# Patient Record
Sex: MALE | Race: BLACK OR AFRICAN AMERICAN
[De-identification: names, ages, dates, MRNs, and addresses within clinical notes are randomized per-mention and may not be internally consistent; named-entity substitution may affect disease eponyms.]

---

## 2019-09-18 ENCOUNTER — HOSPITAL ENCOUNTER (INPATIENT)
Dept: HOSPITAL 74 - YASAS | Age: 64
LOS: 13 days | Discharge: HOME | DRG: 772 | End: 2019-10-01
Attending: NEUROMUSCULOSKELETAL MEDICINE & OMM | Admitting: NEUROMUSCULOSKELETAL MEDICINE & OMM
Payer: COMMERCIAL

## 2019-09-18 VITALS — BODY MASS INDEX: 19.6 KG/M2

## 2019-09-18 DIAGNOSIS — F14.20: ICD-10-CM

## 2019-09-18 DIAGNOSIS — F10.20: Primary | ICD-10-CM

## 2019-09-18 DIAGNOSIS — I10: ICD-10-CM

## 2019-09-18 DIAGNOSIS — H10.89: ICD-10-CM

## 2019-09-18 DIAGNOSIS — F17.210: ICD-10-CM

## 2019-09-18 DIAGNOSIS — K21.9: ICD-10-CM

## 2019-09-18 DIAGNOSIS — I69.351: ICD-10-CM

## 2019-09-18 DIAGNOSIS — G40.909: ICD-10-CM

## 2019-09-18 DIAGNOSIS — F19.282: ICD-10-CM

## 2019-09-18 DIAGNOSIS — Z21: ICD-10-CM

## 2019-09-18 DIAGNOSIS — F12.20: ICD-10-CM

## 2019-09-18 PROCEDURE — HZ42ZZZ GROUP COUNSELING FOR SUBSTANCE ABUSE TREATMENT, COGNITIVE-BEHAVIORAL: ICD-10-PCS | Performed by: ALLERGY & IMMUNOLOGY

## 2019-09-18 RX ADMIN — Medication SCH: at 22:55

## 2019-09-18 RX ADMIN — ATOVAQUONE SCH: 750 SUSPENSION ORAL at 22:54

## 2019-09-18 RX ADMIN — PANTOPRAZOLE SODIUM SCH: 20 TABLET, DELAYED RELEASE ORAL at 22:54

## 2019-09-18 RX ADMIN — LEVETIRACETAM SCH: 500 TABLET, FILM COATED ORAL at 22:54

## 2019-09-18 RX ADMIN — ATORVASTATIN CALCIUM SCH: 20 TABLET, FILM COATED ORAL at 22:54
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
## 2019-09-18 NOTE — PN
Teaching Attending Note


Name of Resident: Kervin Cherry





ATTENDING PHYSICIAN STATEMENT





I saw and evaluated the patient.


I reviewed the resident's note and discussed the case with the resident.


I agree with the resident's findings and plan as documented.








SUBJECTIVE: 65yo with h/o HIV, HTN, seizures and PUD, recently discharged from 

[facility] after a 2 day stay for seizures- was not taking his meds. 


Pt here for rehab- uses alcohol and marijuana.





There are no beds available- pt seen by counselor and will go home, as there 

are no other beds available, pt will return tomorrow
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
## 2019-09-19 LAB
ALBUMIN SERPL-MCNC: 2.7 G/DL (ref 3.4–5)
ALP SERPL-CCNC: 45 U/L (ref 45–117)
ALT SERPL-CCNC: 19 U/L (ref 13–61)
ANION GAP SERPL CALC-SCNC: 7 MMOL/L (ref 8–16)
AST SERPL-CCNC: 21 U/L (ref 15–37)
BILIRUB SERPL-MCNC: 0.3 MG/DL (ref 0.2–1)
BUN SERPL-MCNC: 15.1 MG/DL (ref 7–18)
CALCIUM SERPL-MCNC: 8.4 MG/DL (ref 8.5–10.1)
CHLORIDE SERPL-SCNC: 109 MMOL/L (ref 98–107)
CO2 SERPL-SCNC: 22 MMOL/L (ref 21–32)
CREAT SERPL-MCNC: 1 MG/DL (ref 0.55–1.3)
DEPRECATED RDW RBC AUTO: 19.3 % (ref 11.9–15.9)
GLUCOSE SERPL-MCNC: 98 MG/DL (ref 74–106)
HCT VFR BLD CALC: 27.2 % (ref 35.4–49)
HGB BLD-MCNC: 9.2 GM/DL (ref 11.7–16.9)
MCH RBC QN AUTO: 29.5 PG (ref 25.7–33.7)
MCHC RBC AUTO-ENTMCNC: 33.7 G/DL (ref 32–35.9)
MCV RBC: 87.8 FL (ref 80–96)
PLATELET # BLD AUTO: 157 K/MM3 (ref 134–434)
PMV BLD: 9.2 FL (ref 7.5–11.1)
POTASSIUM SERPLBLD-SCNC: 3.9 MMOL/L (ref 3.5–5.1)
PROT SERPL-MCNC: 7.3 G/DL (ref 6.4–8.2)
RBC # BLD AUTO: 3.1 M/MM3 (ref 4–5.6)
SODIUM SERPL-SCNC: 138 MMOL/L (ref 136–145)
WBC # BLD AUTO: 1.7 K/MM3 (ref 4–10)

## 2019-09-19 RX ADMIN — MAGNESIUM HYDROXIDE PRN ML: 400 SUSPENSION ORAL at 19:12

## 2019-09-19 RX ADMIN — Medication SCH TAB: at 10:08

## 2019-09-19 RX ADMIN — PANTOPRAZOLE SODIUM SCH MG: 20 TABLET, DELAYED RELEASE ORAL at 21:30

## 2019-09-19 RX ADMIN — LEVETIRACETAM SCH MG: 500 TABLET, FILM COATED ORAL at 10:08

## 2019-09-19 RX ADMIN — NICOTINE SCH: 7 PATCH TRANSDERMAL at 10:09

## 2019-09-19 RX ADMIN — QUETIAPINE FUMARATE SCH MG: 100 TABLET ORAL at 21:30

## 2019-09-19 RX ADMIN — ATOVAQUONE SCH MG: 750 SUSPENSION ORAL at 10:08

## 2019-09-19 RX ADMIN — ALUMINUM HYDROXIDE, MAGNESIUM HYDROXIDE, AND SIMETHICONE PRN ML: 200; 200; 20 SUSPENSION ORAL at 14:46

## 2019-09-19 RX ADMIN — PANTOPRAZOLE SODIUM SCH MG: 20 TABLET, DELAYED RELEASE ORAL at 10:08

## 2019-09-19 RX ADMIN — Medication SCH MG: at 21:30

## 2019-09-19 RX ADMIN — ATOVAQUONE SCH: 750 SUSPENSION ORAL at 21:30

## 2019-09-19 RX ADMIN — ATORVASTATIN CALCIUM SCH MG: 20 TABLET, FILM COATED ORAL at 21:30

## 2019-09-19 RX ADMIN — LEVETIRACETAM SCH MG: 500 TABLET, FILM COATED ORAL at 21:30

## 2019-09-19 NOTE — CONSULT
BHS Psychiatric Consult





- Data


Date of interview: 09/19/19


Admission source: [facility]


Identifying data: Patient is a 64 year old single  male, father 

of seven, unemployed, domiciled, and is supported by [facility]. This is patient's 

first admission to rehab at [facility]. Patient admitted to 

 for alcohol, marijuana, and cocaine dependence.


Substance Abuse History: Smoking Cessation.  Smoking history: Current every day 

smoker.  Aproximately how many cigarettes per day: 2.  - Substances abused.  ** 

Alcohol.  Substance route: Oral.  Frequency: 1-2 times per week.  Amount used: 1

/2 pint of vodka.  Age of first use: 14.  Date of last use: 09/11/19.  ** 

Marijuana/Hashish.  Substance route: Smoking.  Frequency: Daily.  Amount used: 

2 blunts.  Age of first use: 11.  Date of last use: 09/09/19


Medical History: hypertension, seizures, CVA


Psychiatric History: Patient denies h/o psychiatric hospitalization, outpatient 

care, and suicide attempt. Patient is currently prescribe zoloft 100mg daily + 

seroquel 100mg (30 day script on 9/9/19) which he claims he received from his 

PCP at the [facility] in [place], [place]. Reports never taking zoloft but has 

taken seroquel for insomnia. At present, patient reports stable mood but is 

experiencing difficulty sleeping.


Physical/Sexual Abuse/Trauma History: denies.





Mental Status Exam





- Mental Status Exam


Alert and Oriented to: Time, Place, Person


Cognitive Function: Good


Patient Appearance: Well Groomed


Mood: Euthymic


Affect: Mood Congruent


Patient Behavior: Cooperative


Speech Pattern: Appropriate


Voice Loudness: Moderately Soft/Quiet


Thought Process: Goal Oriented


Thought Disorder: Not Present


Hallucinations: Denies


Suicidal Ideation: Denies


Homicidal Ideation: Denies


Insight/Judgement: Poor


Sleep: Poorly


Appetite: Fair


Muscle strength/Tone: Normal


Gait/Station: Other (Patient ambulates with a cane.)





Psychiatric Findings





- Problem List (Axis 1, 2,3)


(1) Alcohol dependence


Current Visit: Yes   Status: Acute   





(2) Cocaine dependence


Current Visit: Yes   Status: Acute   





(3) Marijuana dependence


Current Visit: Yes   Status: Acute   





(4) Substance-induced sleep disorder


Current Visit: Yes   Status: Acute   





- Initial Treatment Plan


Initial Treatment Plan: Psychoeducation provided. Rehab in progress. Will order 

Seroquel 50mg HS (reduced dose due to noncompliance and unsteady gait). 

Benefits and side effects discussed. Verbal consent given.

## 2019-09-19 NOTE — EKG
Test Reason : 

Blood Pressure : ***/*** mmHG

Vent. Rate : 069 BPM     Atrial Rate : 069 BPM

   P-R Int : 156 ms          QRS Dur : 072 ms

    QT Int : 416 ms       P-R-T Axes : 076 059 056 degrees

   QTc Int : 445 ms

 

NORMAL SINUS RHYTHM

NORMAL ECG

NO PREVIOUS ECGS AVAILABLE

Confirmed by LACI ARAMBULA, MICHOACANO ([id number]) on 9/19/2019 2:09:49 PM

 

Referred By: CHICO MONTE           Confirmed By:MICHOACANO AGUERO MD

## 2019-09-19 NOTE — PN
BHS Progress Note (SOAP)


Subjective: 


Patient experienced a witnessed seizure. On Keppra, level pending. PMHx of 

seizure disorder from childhood. 





Objective: 


BP: 143/76, HR-93, afebrile, O2 sats98%. during seizure. 


General: calm and fatigued after seizure


HEENT: normocephalic, PERRLA


Lungs: clear


Heart: s1 s2 audible


Neuro: CN 2-12 intact, 


09/19/19 15:33





Assessment: 


seizure disorder


09/19/19 15:35





Plan: 


Keppra level pending. Consulted with chief resident. If keppra level is within 

normal limits, increase Keppra dose from 500mg to 1,000 BID. Continue to monitor

; transfer to ER is not needed at this time. Nurses aware of plan.

## 2019-09-19 NOTE — PN
BHS Progress Note


Note: 


patient is adherent to his HIV medications. Also required prophylaxis for MAC. 

Bictarvy and Azithromycin ordered for the patient. Discussed with patient.

## 2019-09-20 RX ADMIN — LEVETIRACETAM SCH MG: 500 TABLET, FILM COATED ORAL at 21:20

## 2019-09-20 RX ADMIN — BICTEGRAVIR SODIUM, EMTRICITABINE, AND TENOFOVIR ALAFENAMIDE FUMARATE SCH EACH: 50; 200; 25 TABLET ORAL at 07:46

## 2019-09-20 RX ADMIN — Medication SCH TAB: at 10:16

## 2019-09-20 RX ADMIN — LEVETIRACETAM SCH MG: 500 TABLET, FILM COATED ORAL at 10:16

## 2019-09-20 RX ADMIN — PANTOPRAZOLE SODIUM SCH MG: 20 TABLET, DELAYED RELEASE ORAL at 10:16

## 2019-09-20 RX ADMIN — Medication SCH MG: at 21:24

## 2019-09-20 RX ADMIN — MAGNESIUM HYDROXIDE PRN ML: 400 SUSPENSION ORAL at 21:28

## 2019-09-20 RX ADMIN — ATORVASTATIN CALCIUM SCH MG: 20 TABLET, FILM COATED ORAL at 21:21

## 2019-09-20 RX ADMIN — ATOVAQUONE SCH: 750 SUSPENSION ORAL at 21:23

## 2019-09-20 RX ADMIN — QUETIAPINE FUMARATE SCH MG: 100 TABLET ORAL at 21:21

## 2019-09-20 RX ADMIN — ALUMINUM HYDROXIDE, MAGNESIUM HYDROXIDE, AND SIMETHICONE PRN ML: 200; 200; 20 SUSPENSION ORAL at 14:33

## 2019-09-20 RX ADMIN — NICOTINE SCH: 7 PATCH TRANSDERMAL at 10:16

## 2019-09-20 RX ADMIN — ATOVAQUONE SCH MG: 750 SUSPENSION ORAL at 10:15

## 2019-09-20 RX ADMIN — PANTOPRAZOLE SODIUM SCH MG: 20 TABLET, DELAYED RELEASE ORAL at 21:21

## 2019-09-21 RX ADMIN — ATORVASTATIN CALCIUM SCH: 20 TABLET, FILM COATED ORAL at 21:19

## 2019-09-21 RX ADMIN — LEVETIRACETAM SCH: 500 TABLET, FILM COATED ORAL at 10:53

## 2019-09-21 RX ADMIN — Medication SCH MG: at 21:19

## 2019-09-21 RX ADMIN — PANTOPRAZOLE SODIUM SCH: 20 TABLET, DELAYED RELEASE ORAL at 10:53

## 2019-09-21 RX ADMIN — ATOVAQUONE SCH: 750 SUSPENSION ORAL at 21:20

## 2019-09-21 RX ADMIN — ALUMINUM HYDROXIDE, MAGNESIUM HYDROXIDE, AND SIMETHICONE PRN ML: 200; 200; 20 SUSPENSION ORAL at 16:44

## 2019-09-21 RX ADMIN — QUETIAPINE FUMARATE SCH: 100 TABLET ORAL at 21:20

## 2019-09-21 RX ADMIN — ATOVAQUONE SCH: 750 SUSPENSION ORAL at 10:53

## 2019-09-21 RX ADMIN — Medication SCH: at 10:53

## 2019-09-21 RX ADMIN — PANTOPRAZOLE SODIUM SCH: 20 TABLET, DELAYED RELEASE ORAL at 21:20

## 2019-09-21 RX ADMIN — BICTEGRAVIR SODIUM, EMTRICITABINE, AND TENOFOVIR ALAFENAMIDE FUMARATE SCH: 50; 200; 25 TABLET ORAL at 08:50

## 2019-09-21 RX ADMIN — NICOTINE SCH: 7 PATCH TRANSDERMAL at 10:53

## 2019-09-21 RX ADMIN — LEVETIRACETAM SCH: 500 TABLET, FILM COATED ORAL at 21:19

## 2019-09-22 RX ADMIN — ATORVASTATIN CALCIUM SCH: 20 TABLET, FILM COATED ORAL at 22:54

## 2019-09-22 RX ADMIN — ATOVAQUONE SCH: 750 SUSPENSION ORAL at 09:59

## 2019-09-22 RX ADMIN — QUETIAPINE FUMARATE SCH: 100 TABLET ORAL at 22:55

## 2019-09-22 RX ADMIN — BICTEGRAVIR SODIUM, EMTRICITABINE, AND TENOFOVIR ALAFENAMIDE FUMARATE SCH: 50; 200; 25 TABLET ORAL at 09:00

## 2019-09-22 RX ADMIN — ATOVAQUONE SCH: 750 SUSPENSION ORAL at 22:54

## 2019-09-22 RX ADMIN — NICOTINE SCH: 7 PATCH TRANSDERMAL at 10:00

## 2019-09-22 RX ADMIN — LEVETIRACETAM SCH: 500 TABLET, FILM COATED ORAL at 22:54

## 2019-09-22 RX ADMIN — PANTOPRAZOLE SODIUM SCH: 20 TABLET, DELAYED RELEASE ORAL at 10:00

## 2019-09-22 RX ADMIN — Medication SCH: at 10:00

## 2019-09-22 RX ADMIN — PANTOPRAZOLE SODIUM SCH: 20 TABLET, DELAYED RELEASE ORAL at 22:54

## 2019-09-22 RX ADMIN — Medication SCH: at 22:55

## 2019-09-22 RX ADMIN — LEVETIRACETAM SCH MG: 500 TABLET, FILM COATED ORAL at 09:58

## 2019-09-23 RX ADMIN — ATOVAQUONE SCH: 750 SUSPENSION ORAL at 22:18

## 2019-09-23 RX ADMIN — ATORVASTATIN CALCIUM SCH: 20 TABLET, FILM COATED ORAL at 22:18

## 2019-09-23 RX ADMIN — LEVETIRACETAM SCH: 500 TABLET, FILM COATED ORAL at 22:17

## 2019-09-23 RX ADMIN — BICTEGRAVIR SODIUM, EMTRICITABINE, AND TENOFOVIR ALAFENAMIDE FUMARATE SCH EACH: 50; 200; 25 TABLET ORAL at 08:03

## 2019-09-23 RX ADMIN — Medication SCH: at 22:18

## 2019-09-23 RX ADMIN — LEVETIRACETAM SCH MG: 500 TABLET, FILM COATED ORAL at 10:05

## 2019-09-23 RX ADMIN — PANTOPRAZOLE SODIUM SCH MG: 20 TABLET, DELAYED RELEASE ORAL at 09:04

## 2019-09-23 RX ADMIN — NICOTINE SCH: 7 PATCH TRANSDERMAL at 10:05

## 2019-09-23 RX ADMIN — Medication SCH TAB: at 10:06

## 2019-09-23 RX ADMIN — ATOVAQUONE SCH: 750 SUSPENSION ORAL at 10:05

## 2019-09-23 RX ADMIN — QUETIAPINE FUMARATE SCH: 100 TABLET ORAL at 22:19

## 2019-09-23 RX ADMIN — PANTOPRAZOLE SODIUM SCH: 20 TABLET, DELAYED RELEASE ORAL at 22:18

## 2019-09-24 LAB
APPEARANCE UR: CLEAR
BILIRUB UR STRIP.AUTO-MCNC: NEGATIVE MG/DL
COLOR UR: YELLOW
KETONES UR QL STRIP: NEGATIVE
LEUKOCYTE ESTERASE UR QL STRIP.AUTO: NEGATIVE
NITRITE UR QL STRIP: NEGATIVE
PH UR: 5.5 [PH] (ref 5–8)
PROT UR QL STRIP: NEGATIVE
PROT UR QL STRIP: NEGATIVE
SP GR UR: 1.02 (ref 1.01–1.03)
UROBILINOGEN UR STRIP-MCNC: 0.2 MG/DL (ref 0.2–1)

## 2019-09-24 RX ADMIN — ALUMINUM HYDROXIDE, MAGNESIUM HYDROXIDE, AND SIMETHICONE PRN ML: 200; 200; 20 SUSPENSION ORAL at 10:02

## 2019-09-24 RX ADMIN — BICTEGRAVIR SODIUM, EMTRICITABINE, AND TENOFOVIR ALAFENAMIDE FUMARATE SCH EACH: 50; 200; 25 TABLET ORAL at 08:01

## 2019-09-24 RX ADMIN — LEVETIRACETAM SCH MG: 500 TABLET, FILM COATED ORAL at 21:28

## 2019-09-24 RX ADMIN — ALUMINUM HYDROXIDE, MAGNESIUM HYDROXIDE, AND SIMETHICONE PRN ML: 200; 200; 20 SUSPENSION ORAL at 21:29

## 2019-09-24 RX ADMIN — Medication SCH MG: at 21:28

## 2019-09-24 RX ADMIN — PANTOPRAZOLE SODIUM SCH MG: 20 TABLET, DELAYED RELEASE ORAL at 10:00

## 2019-09-24 RX ADMIN — NICOTINE SCH: 7 PATCH TRANSDERMAL at 10:00

## 2019-09-24 RX ADMIN — ATOVAQUONE SCH: 750 SUSPENSION ORAL at 10:00

## 2019-09-24 RX ADMIN — LEVETIRACETAM SCH MG: 500 TABLET, FILM COATED ORAL at 10:00

## 2019-09-24 RX ADMIN — PANTOPRAZOLE SODIUM SCH MG: 20 TABLET, DELAYED RELEASE ORAL at 21:28

## 2019-09-24 RX ADMIN — ATOVAQUONE SCH: 750 SUSPENSION ORAL at 21:28

## 2019-09-24 RX ADMIN — Medication SCH TAB: at 10:00

## 2019-09-24 RX ADMIN — ATORVASTATIN CALCIUM SCH MG: 20 TABLET, FILM COATED ORAL at 21:28

## 2019-09-24 RX ADMIN — QUETIAPINE FUMARATE SCH MG: 100 TABLET ORAL at 21:27

## 2019-09-25 RX ADMIN — BICTEGRAVIR SODIUM, EMTRICITABINE, AND TENOFOVIR ALAFENAMIDE FUMARATE SCH EACH: 50; 200; 25 TABLET ORAL at 08:07

## 2019-09-25 RX ADMIN — NICOTINE SCH: 7 PATCH TRANSDERMAL at 10:08

## 2019-09-25 RX ADMIN — ATOVAQUONE SCH: 750 SUSPENSION ORAL at 21:18

## 2019-09-25 RX ADMIN — ALUMINUM HYDROXIDE, MAGNESIUM HYDROXIDE, AND SIMETHICONE PRN ML: 200; 200; 20 SUSPENSION ORAL at 21:18

## 2019-09-25 RX ADMIN — LEVETIRACETAM SCH MG: 500 TABLET, FILM COATED ORAL at 10:07

## 2019-09-25 RX ADMIN — ALUMINUM HYDROXIDE, MAGNESIUM HYDROXIDE, AND SIMETHICONE PRN ML: 200; 200; 20 SUSPENSION ORAL at 10:09

## 2019-09-25 RX ADMIN — PANTOPRAZOLE SODIUM SCH MG: 20 TABLET, DELAYED RELEASE ORAL at 21:18

## 2019-09-25 RX ADMIN — QUETIAPINE FUMARATE SCH MG: 100 TABLET ORAL at 21:18

## 2019-09-25 RX ADMIN — ATORVASTATIN CALCIUM SCH MG: 20 TABLET, FILM COATED ORAL at 21:18

## 2019-09-25 RX ADMIN — Medication SCH TAB: at 10:06

## 2019-09-25 RX ADMIN — PANTOPRAZOLE SODIUM SCH MG: 20 TABLET, DELAYED RELEASE ORAL at 10:06

## 2019-09-25 RX ADMIN — Medication SCH MG: at 21:18

## 2019-09-25 RX ADMIN — ATOVAQUONE SCH: 750 SUSPENSION ORAL at 10:07

## 2019-09-25 RX ADMIN — LEVETIRACETAM SCH MG: 500 TABLET, FILM COATED ORAL at 21:18

## 2019-09-26 RX ADMIN — TETRAHYDROZOLINE HCL SCH DROP: 0.05 SOLUTION/ DROPS OPHTHALMIC at 21:40

## 2019-09-26 RX ADMIN — Medication SCH MG: at 21:39

## 2019-09-26 RX ADMIN — PANTOPRAZOLE SODIUM SCH MG: 20 TABLET, DELAYED RELEASE ORAL at 10:01

## 2019-09-26 RX ADMIN — LEVETIRACETAM SCH MG: 500 TABLET, FILM COATED ORAL at 21:39

## 2019-09-26 RX ADMIN — LEVETIRACETAM SCH MG: 500 TABLET, FILM COATED ORAL at 10:00

## 2019-09-26 RX ADMIN — ATORVASTATIN CALCIUM SCH MG: 20 TABLET, FILM COATED ORAL at 21:39

## 2019-09-26 RX ADMIN — ALUMINUM HYDROXIDE, MAGNESIUM HYDROXIDE, AND SIMETHICONE PRN ML: 200; 200; 20 SUSPENSION ORAL at 10:04

## 2019-09-26 RX ADMIN — NICOTINE SCH: 7 PATCH TRANSDERMAL at 10:01

## 2019-09-26 RX ADMIN — TETRAHYDROZOLINE HCL SCH DROP: 0.05 SOLUTION/ DROPS OPHTHALMIC at 18:15

## 2019-09-26 RX ADMIN — PANTOPRAZOLE SODIUM SCH MG: 20 TABLET, DELAYED RELEASE ORAL at 21:39

## 2019-09-26 RX ADMIN — BICTEGRAVIR SODIUM, EMTRICITABINE, AND TENOFOVIR ALAFENAMIDE FUMARATE SCH EACH: 50; 200; 25 TABLET ORAL at 09:00

## 2019-09-26 RX ADMIN — Medication SCH TAB: at 10:01

## 2019-09-26 RX ADMIN — QUETIAPINE FUMARATE SCH MG: 100 TABLET ORAL at 21:39

## 2019-09-26 RX ADMIN — ATOVAQUONE SCH: 750 SUSPENSION ORAL at 10:01

## 2019-09-26 RX ADMIN — ATOVAQUONE SCH: 750 SUSPENSION ORAL at 21:39

## 2019-09-26 NOTE — PN
BHS Progress Note (SOAP)


Subjective: 


Patient c/o redness and itching in right eye. Denies injury, purulent discharge

, stuck together eyelids upon awakening. 





Objective: 


P/E


General: no apparent distress, resting comfortably in bed


HEENTM: PERRLA, Right eye injected, tearing. 


09/26/19 14:03





Assessment: 


allergic conjunctivitis vs viral conjunctivitis


09/26/19 14:04





Plan: 


Ordered Naphcon-A, will continue to monitor; any changes will re-assess.

## 2019-09-27 ENCOUNTER — HOSPITAL ENCOUNTER (EMERGENCY)
Dept: HOSPITAL 74 - JER | Age: 64
LOS: 1 days | Discharge: ANOTHER HOSPITAL | End: 2019-09-28
Payer: COMMERCIAL

## 2019-09-27 VITALS — BODY MASS INDEX: 19.6 KG/M2

## 2019-09-27 VITALS — SYSTOLIC BLOOD PRESSURE: 140 MMHG | DIASTOLIC BLOOD PRESSURE: 77 MMHG | TEMPERATURE: 98.2 F | HEART RATE: 95 BPM

## 2019-09-27 DIAGNOSIS — F14.10: ICD-10-CM

## 2019-09-27 DIAGNOSIS — F17.210: ICD-10-CM

## 2019-09-27 DIAGNOSIS — I10: ICD-10-CM

## 2019-09-27 DIAGNOSIS — F10.10: ICD-10-CM

## 2019-09-27 DIAGNOSIS — G40.909: Primary | ICD-10-CM

## 2019-09-27 DIAGNOSIS — E78.00: ICD-10-CM

## 2019-09-27 DIAGNOSIS — Z21: ICD-10-CM

## 2019-09-27 DIAGNOSIS — Z87.11: ICD-10-CM

## 2019-09-27 DIAGNOSIS — I69.851: ICD-10-CM

## 2019-09-27 LAB
ALBUMIN SERPL-MCNC: 2.4 G/DL (ref 3.4–5)
ALP SERPL-CCNC: 52 U/L (ref 45–117)
ALT SERPL-CCNC: 29 U/L (ref 13–61)
ANION GAP SERPL CALC-SCNC: 6 MMOL/L (ref 8–16)
ANISOCYTOSIS BLD QL: (no result)
APPEARANCE UR: CLEAR
AST SERPL-CCNC: 21 U/L (ref 15–37)
BASOPHILS # BLD: 2.3 % (ref 0–2)
BILIRUB SERPL-MCNC: 0.7 MG/DL (ref 0.2–1)
BILIRUB UR STRIP.AUTO-MCNC: NEGATIVE MG/DL
BUN SERPL-MCNC: 19.2 MG/DL (ref 7–18)
CALCIUM SERPL-MCNC: 7.8 MG/DL (ref 8.5–10.1)
CHLORIDE SERPL-SCNC: 109 MMOL/L (ref 98–107)
CO2 SERPL-SCNC: 24 MMOL/L (ref 21–32)
COLOR UR: YELLOW
CREAT SERPL-MCNC: 1 MG/DL (ref 0.55–1.3)
DEPRECATED RDW RBC AUTO: 18.4 % (ref 11.9–15.9)
EOSINOPHIL # BLD: 4.6 % (ref 0–4.5)
GLUCOSE SERPL-MCNC: 106 MG/DL (ref 74–106)
HCT VFR BLD CALC: 24.2 % (ref 35.4–49)
HGB BLD-MCNC: 8.1 GM/DL (ref 11.7–16.9)
KETONES UR QL STRIP: NEGATIVE
LEUKOCYTE ESTERASE UR QL STRIP.AUTO: NEGATIVE
LYMPHOCYTES # BLD: 16.3 % (ref 8–40)
MCH RBC QN AUTO: 29.7 PG (ref 25.7–33.7)
MCHC RBC AUTO-ENTMCNC: 33.3 G/DL (ref 32–35.9)
MCV RBC: 89.3 FL (ref 80–96)
MONOCYTES # BLD AUTO: 8.2 % (ref 3.8–10.2)
NEUTROPHILS # BLD: 68.6 % (ref 42.8–82.8)
NITRITE UR QL STRIP: NEGATIVE
PH UR: 5.5 [PH] (ref 5–8)
PLATELET # BLD AUTO: 158 K/MM3 (ref 134–434)
PLATELET BLD QL SMEAR: (no result)
PMV BLD: 8.5 FL (ref 7.5–11.1)
POTASSIUM SERPLBLD-SCNC: 4.2 MMOL/L (ref 3.5–5.1)
PROT SERPL-MCNC: 6.5 G/DL (ref 6.4–8.2)
PROT UR QL STRIP: NEGATIVE
PROT UR QL STRIP: NEGATIVE
RBC # BLD AUTO: 2.71 M/MM3 (ref 4–5.6)
SODIUM SERPL-SCNC: 139 MMOL/L (ref 136–145)
SP GR UR: 1.02 (ref 1.01–1.03)
UROBILINOGEN UR STRIP-MCNC: 0.2 MG/DL (ref 0.2–1)
WBC # BLD AUTO: 2.3 K/MM3 (ref 4–10)

## 2019-09-27 PROCEDURE — 3E033NZ INTRODUCTION OF ANALGESICS, HYPNOTICS, SEDATIVES INTO PERIPHERAL VEIN, PERCUTANEOUS APPROACH: ICD-10-PCS

## 2019-09-27 RX ADMIN — TETRAHYDROZOLINE HCL SCH: 0.05 SOLUTION/ DROPS OPHTHALMIC at 14:36

## 2019-09-27 RX ADMIN — TETRAHYDROZOLINE HCL SCH: 0.05 SOLUTION/ DROPS OPHTHALMIC at 10:15

## 2019-09-27 RX ADMIN — QUETIAPINE FUMARATE SCH: 100 TABLET ORAL at 21:38

## 2019-09-27 RX ADMIN — TETRAHYDROZOLINE HCL SCH: 0.05 SOLUTION/ DROPS OPHTHALMIC at 18:11

## 2019-09-27 RX ADMIN — Medication SCH TAB: at 10:10

## 2019-09-27 RX ADMIN — LEVETIRACETAM SCH: 500 TABLET, FILM COATED ORAL at 21:37

## 2019-09-27 RX ADMIN — ATOVAQUONE SCH: 750 SUSPENSION ORAL at 21:38

## 2019-09-27 RX ADMIN — MAGNESIUM HYDROXIDE PRN ML: 400 SUSPENSION ORAL at 10:15

## 2019-09-27 RX ADMIN — NICOTINE SCH: 7 PATCH TRANSDERMAL at 10:15

## 2019-09-27 RX ADMIN — ATORVASTATIN CALCIUM SCH: 20 TABLET, FILM COATED ORAL at 21:37

## 2019-09-27 RX ADMIN — LEVETIRACETAM SCH MG: 500 TABLET, FILM COATED ORAL at 10:10

## 2019-09-27 RX ADMIN — ATOVAQUONE SCH: 750 SUSPENSION ORAL at 10:11

## 2019-09-27 RX ADMIN — PANTOPRAZOLE SODIUM SCH MG: 20 TABLET, DELAYED RELEASE ORAL at 10:10

## 2019-09-27 RX ADMIN — Medication SCH: at 21:41

## 2019-09-27 RX ADMIN — BICTEGRAVIR SODIUM, EMTRICITABINE, AND TENOFOVIR ALAFENAMIDE FUMARATE SCH EACH: 50; 200; 25 TABLET ORAL at 10:15

## 2019-09-27 RX ADMIN — PANTOPRAZOLE SODIUM SCH: 20 TABLET, DELAYED RELEASE ORAL at 21:38

## 2019-09-27 RX ADMIN — TETRAHYDROZOLINE HCL SCH: 0.05 SOLUTION/ DROPS OPHTHALMIC at 21:41
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
## 2019-09-27 NOTE — PN
BHS Progress Note


Note: 


Rapid response called due to patient having seizure. When writer arrived to unit

, patient was sitting in chair in hallway, alert and verbally responsive. 





PMH HIV +, ETOH/THC dependence and Seizure disorder. Last witnessed seizure 9/18 /19. Patient on Keppra and dose adjusted by NP Darcel Reyes on 9/18/19. Last 

Keppra level 9/19/19 4.3. 





V/S





128/78


114


22





O2 sats 95%








Oxygen 3l via nc applied and patient's safety maintained. After one minute, 

patient had olfactory aura and then began to seize again, for approximately 15-

20 seconds. 911 called and patient monitored by Dr. Martinez, nursing staff and 

provider until EMS arrived. Patient had one more witness seizure with EMS 

present and patient medicated with Versed by EMS. Patient transferred to [facility] 

ER for evaluation and treatment. Report given to Omayra Malhotra RN.

## 2019-09-27 NOTE — PDOC
History of Present Illness





- General


Chief Complaint: Seizure


Stated Complaint: SEIZURE


Time Seen by Provider: 09/27/19 16:41





Past History





- Past Medical History


Allergies/Adverse Reactions: 


 Allergies











Allergy/AdvReac Type Severity Reaction Status Date / Time


 


No Known Allergies Allergy   Verified 09/18/19 14:08











Home Medications: 


Ambulatory Orders





Atorvastatin Ca [Lipitor] 20 mg PO DAILY 09/18/19 


Atovaquone [Mepron -] 10 ml PO BID 09/18/19 


Famotidine 20 mg PO BID 09/18/19 


Gabapentin 600 mg PO TID 09/18/19 


Levetiracetam 500 mg PO BID 09/18/19 


Mag Hydrox/Aluminum Hyd/Simeth [Almacone Suspension] 355 ml PO QID PRN 09/18/19 


Multivitamins [Tab-A-Vit -] 1 tab PO DAILY 09/18/19 


Nicotine Patch [Nicoderm Patch -] 1 patch TD DAILY 09/18/19 


Quetiapine Fumarate [Seroquel -] 100 mg PO HS 09/18/19 


Sertraline HCl [Zoloft] 100 mg PO DAILY 09/18/19 


Valganciclovir HCl 900 mg PO BID 09/18/19 


Azithromycin [Zithromax 600mg Tablets -] 1,200 mg PO ONCE #10 tablet 09/19/19 


Bictegrav/Emtricit/Tenofov Ala [Biktarvy -25 mg Tablet] 1 each PO DAILY #

30 tablet 09/19/19 


Sulfamethoxazole/Trimethoprim [Bactrim Ds -] 1 tab PO DAILY #30 tablet 09/19/19 








Asthma: Yes


Cancer: No


Cardiac Disorders: No


CVA: Yes (rt side weakness)


COPD: No


Diabetes: No


GI Disorders: No


 Disorders: No


HTN: Yes


Hypercholesterolemia: Yes


Kidney Stones: No


Seizures: Yes





- Surgical History


Abdominal Surgery: No


Appendectomy: No


Cardiac Surgery: No


Cholecystectomy: No


Lung Surgery: No


Neurologic Surgery: No


Orthopedic Surgery: No





- Reproductive History


Testicular Surgery: No





- Psycho Social/Smoking Cessation Hx


Smoking History: Current every day smoker


Have you smoked in the past 12 months: Yes


Number of Cigarettes Smoked Daily: 2


Information on smoking cessation initiated: No


Hx Alcohol Use: Yes


Drug/Substance Use Hx: Yes (cocaine)


Hx Substance Use Treatment: No





*Physical Exam





- Vital Signs


 Last Vital Signs











Temp Pulse Resp BP Pulse Ox


 


 98.2 F   95 H  18   140/77   100 


 


 09/27/19 16:00  09/27/19 16:00  09/27/19 16:00  09/27/19 16:00  09/27/19 16:00














ED Treatment Course





- LABORATORY


CBC & Chemistry Diagram: 


 09/27/19 18:15





 09/27/19 18:15





- ADDITIONAL ORDERS


Additional order review: 


 Laboratory  Results











  09/27/19





  16:31


 


POC Glucometer  111








 











  09/27/19





  16:31


 


POC Glucometer  111














Medical Decision Making





- Medical Decision Making


HPI: 


63yo M with PMH of seizures (on keppra), HIV (unknown CD4 count, undetectable 

viral load), CVA with residual right sided weakness, at [facility] for [place] 

joints (marijuanan and cocaine), ETOH sent by [facility] for evaluation of 

seizures. Per [facility] patient had five seizures from 2:35pm to 3:05pm. He 

received Versed 5mg from EMS and had a glucose of 118. Per patient, it is not 

unusual for him to have multiple seizures in a single day. His most recent 

seizure was five days ago. Patient's keppra level was subtherapeutic on 9/19/19 

and was increased from 500mg to 1000mg. Norman's usual seizure triggers 

include stress and he has been under considerable stress recently. Patient 

receives his medical care from [facility] [place]. While he has been compliant on his HIV 

medication, he had not been taking his keppra consistently. His seizure 

medication was changed from dilantin to keppra about four months ago when he 

was at [facility]. Upon review of systems, patient endorses fever, chills, 

headache, abdominal pain, and chest wall tenderness, sequelae which he 

associates with having had a seizure. 





ROS:


Constitutional: +fever, +chills


HEENT: no throat pain, no dysphagia


Cardiovascular: +chest pain, no palpitations


Respiratory: no cough, no shortness of breath


Gastrointestinal: +abdominal pain, no nausea


Genitourinary: no dysuria, no hematuria


Musculoskeletal: no myalgia, no arthralgia


Skin: no rash, no itching


Neurologic: +headache, +weakness





PE: 


General: Awake, alert, and fully oriented, in no acute distress


Head: No signs of trauma


Eyes: EOMI, sclera anicteric


ENT: Moist mucus membranes


Neck: Normal ROM, supple


Lungs: Lungs clear, Normal breath sounds


Cardio: Regular rhythm, S1 and S2 present


Abdomen: Soft, nontender. No guarding, no rebound, no masses


Extremities: Normal range of motion, Distal pulses present


SKIN: Warm, Dry, normal turgor


Neurologic: Cranial nerves II through XII intact. Normal speech, coordination. 

Decreased strength and sensation on RUE and RLE when compared to LUE and LLE





ED Course/MDM: 


DDX including but not limited to seizure due to subtherapeutic keppra level, 

breakthrough seizure, alcohol withdrawal


Labs, EKG, CXR


[facility]





E[facility]: rate 84, QTc 475, NSR


09/27/19 16:41





Call over to [facility]; Spoke with ext: [phone], Miss Underwood


patient did not fall as staff and his roommate kept him from falling. 


2:35pm patient had a seizure for about 45 second and had multiple seizures 

until 3:05pm


09/27/19 17:19





 CBC











WBC  2.3 K/mm3 (4.0-10.0)  L  09/27/19  18:15    


 


RBC  2.71 M/mm3 (4.00-5.60)  L  09/27/19  18:15    


 


Hgb  8.1 GM/dL (11.7-16.9)  L  09/27/19  18:15    


 


Hct  24.2 % (35.4-49)  L  09/27/19  18:15    


 


MCV  89.3 fl (80-96)   09/27/19  18:15    


 


MCH  29.7 pg (25.7-33.7)   09/27/19  18:15    


 


MCHC  33.3 g/dl (32.0-35.9)   09/27/19  18:15    


 


RDW  18.4 % (11.9-15.9)  H  09/27/19  18:15    


 


Plt Count  158 K/MM3 (134-434)   09/27/19  18:15    


 


MPV  8.5 fl (7.5-11.1)   09/27/19  18:15    


 


Absolute Neuts (auto)  1.6 K/mm3 (1.5-8.0)   09/27/19  18:15    


 


Neutrophils %  68.6 % (42.8-82.8)   09/27/19  18:15    


 


Neutrophils % (Manual)  67.4 % (42.8-82.8)   09/27/19  18:15    


 


Band Neutrophils %  2.0 %  09/27/19  18:15    


 


Lymphocytes %  16.3 % (8-40)   09/27/19  18:15    


 


Lymphocytes % (Manual)  17.4 % (8-40)   09/27/19  18:15    


 


Monocytes %  8.2 % (3.8-10.2)   09/27/19  18:15    


 


Monocytes % (Manual)  6 % (3.8-10.2)   09/27/19  18:15    


 


Eosinophils %  4.6 % (0-4.5)  H  09/27/19  18:15    


 


Eosinophils % (Manual)  2.0 % (0-4.5)   09/27/19  18:15    


 


Basophils %  2.3 % (0-2.0)  H  09/27/19  18:15    


 


Basophils % (Manual)  0.0 % (0-2.0)   09/27/19  18:15    


 


Myelocytes % (Man)  0 % (0-2)   09/27/19  18:15    


 


Promyelocytes % (Man)  0 % (0-2)   09/27/19  18:15    


 


Blast Cells % (Manual)  4 % (0-0)  H  09/27/19  18:15    


 


Nucleated RBC %  0 % (0-0)   09/27/19  18:15    


 


Metamyelocytes  1 % (0-2)   09/27/19  18:15    


 


Hypochromia  1+   09/27/19  18:15    


 


Platelet Estimate  Decreased   09/27/19  18:15    


 


Anisocytosis  2+   09/27/19  18:15    








Hgb with drop by about 1 over one week


Low WBC





 CMP











Sodium  139 mmol/L (136-145)   09/27/19  18:15    


 


Potassium  4.2 mmol/L (3.5-5.1)   09/27/19  18:15    


 


Chloride  109 mmol/L ()  H  09/27/19  18:15    


 


Carbon Dioxide  24 mmol/L (21-32)   09/27/19  18:15    


 


Anion Gap  6 MMOL/L (8-16)  L  09/27/19  18:15    


 


BUN  19.2 mg/dL (7-18)  H  09/27/19  18:15    


 


Creatinine  1.0 mg/dL (0.55-1.3)   09/27/19  18:15    


 


Est GFR (CKD-EPI)AfAm  93.08   09/27/19  18:15    


 


Est GFR (CKD-EPI)NonAf  80.31   09/27/19  18:15    


 


POC Glucometer  111 UNITS ()   09/27/19  16:31    


 


Random Glucose  106 mg/dL ()   09/27/19  18:15    


 


Calcium  7.8 mg/dL (8.5-10.1)  L  09/27/19  18:15    


 


Total Bilirubin  0.7 mg/dL (0.2-1)   09/27/19  18:15    


 


AST  21 U/L (15-37)   09/27/19  18:15    


 


ALT  29 U/L (13-61)   09/27/19  18:15    


 


Alkaline Phosphatase  52 U/L ()   09/27/19  18:15    


 


Troponin I  < 0.02 ng/ml (0.00-0.05)   09/27/19  18:15    


 


Total Protein  6.5 g/dl (6.4-8.2)   09/27/19  18:15    


 


Albumin  2.4 g/dl (3.4-5.0)  L  09/27/19  18:15    








Electrolytes unremarkable


Normal Cr


Tpn undetectable





FOBT negative


UA negative for infection





CXR, as read by radiology: "The cardiac silhouette is borderline in size to 

slightly enlarged allowing for magnification. There are mild bilateral 

increased lung markings. There is mild elevation of the left hemidiaphragm with 

suggestion of atelectatic changes in the left lung base. Cannot rule out 

infiltrates. No pneumothorax is seen. Mediastinum and visualized osseous 

structures appear intact Impression: Mild elevation of the left hemidiaphragm 

with suggestion of mild atelectatic changes in the left lung base. Cannot rule 

out infiltrates. Follow-up chest x-ray, PA and lateral is needed for further 

evaluation. "





Patient denies melena or hematemesis. 


Dr. Sheth spoke with provider [facility] and patient's baseline hgb is 8. As he 

is at his baseline and is hemodynamically stable here, he is good candidate for 

repeat blood work at [facility]. We will call over and see if [facility] can 

accommodate the repeat blood work. 


09/27/19 21:05





Spoke with Yasmine at [facility] to update her regarding patient's discharge. He 

is able to get repeat blood work tomorrow. 


09/27/19 21:44





Patient awaiting transportation back to [facility]. I explained to the patient 

that since he is an admitted patient, he needs to return to [facility] via 

ambulance which does take a significant amount of time to arrange. He became 

angry and raised his voice, stating he could not understand what would take so 

long. I explained that we have ordered transportation and thatthat he could 

leave AMA if he did not want to wait. Patient amenable to awaiting 

transportation. 











Discharge





- Discharge Information


Problems reviewed: Yes


Clinical Impression/Diagnosis: 


 Seizure





Disposition: TRANSFER ACUTE CARE/OTHER HOSP





- Follow up/Referral





- Patient Discharge Instructions


Patient Printed Discharge Instructions:  DI for Seizure Disorder -- Adult


Additional Instructions: 


You came to the emergency department for a seizure and fall. Lab work was at 

your baseline. 





We are concerned about your low hemoglobin. Your stool sample was negative for 

blood. Get repeat blood work tomorrow to monitor your hemoglobin level. 





Follow up with your neurologist in five to seven days to discuss this ED visit 

and to further evaluate your seizures. Your care is not complete until you do 

so. Call and make an appointment. 





RETURN if: you have severe headache, high fever, persistent vomiting, changes 

in vision, severe muscle pain, dark (tea-colored) urine, recurrent seizures, or 

any other new or concerning symptoms








- Post Discharge Activity

## 2019-09-28 RX ADMIN — ATOVAQUONE SCH: 750 SUSPENSION ORAL at 09:53

## 2019-09-28 RX ADMIN — PANTOPRAZOLE SODIUM SCH MG: 20 TABLET, DELAYED RELEASE ORAL at 09:50

## 2019-09-28 RX ADMIN — TETRAHYDROZOLINE HCL SCH DROP: 0.05 SOLUTION/ DROPS OPHTHALMIC at 17:27

## 2019-09-28 RX ADMIN — PANTOPRAZOLE SODIUM SCH: 20 TABLET, DELAYED RELEASE ORAL at 21:55

## 2019-09-28 RX ADMIN — TETRAHYDROZOLINE HCL SCH DROP: 0.05 SOLUTION/ DROPS OPHTHALMIC at 10:10

## 2019-09-28 RX ADMIN — ATORVASTATIN CALCIUM SCH: 20 TABLET, FILM COATED ORAL at 21:55

## 2019-09-28 RX ADMIN — QUETIAPINE FUMARATE SCH: 100 TABLET ORAL at 21:55

## 2019-09-28 RX ADMIN — LEVETIRACETAM SCH MG: 500 TABLET, FILM COATED ORAL at 09:50

## 2019-09-28 RX ADMIN — BICTEGRAVIR SODIUM, EMTRICITABINE, AND TENOFOVIR ALAFENAMIDE FUMARATE SCH EACH: 50; 200; 25 TABLET ORAL at 07:41

## 2019-09-28 RX ADMIN — ATOVAQUONE SCH: 750 SUSPENSION ORAL at 21:55

## 2019-09-28 RX ADMIN — NICOTINE SCH: 7 PATCH TRANSDERMAL at 09:53

## 2019-09-28 RX ADMIN — TETRAHYDROZOLINE HCL SCH: 0.05 SOLUTION/ DROPS OPHTHALMIC at 21:55

## 2019-09-28 RX ADMIN — TETRAHYDROZOLINE HCL SCH DROP: 0.05 SOLUTION/ DROPS OPHTHALMIC at 14:05

## 2019-09-28 RX ADMIN — ALUMINUM HYDROXIDE, MAGNESIUM HYDROXIDE, AND SIMETHICONE PRN ML: 200; 200; 20 SUSPENSION ORAL at 09:52

## 2019-09-28 RX ADMIN — LEVETIRACETAM SCH MG: 500 TABLET, FILM COATED ORAL at 21:53

## 2019-09-28 RX ADMIN — Medication SCH: at 21:55

## 2019-09-28 RX ADMIN — Medication SCH TAB: at 09:50

## 2019-09-29 RX ADMIN — TETRAHYDROZOLINE HCL SCH: 0.05 SOLUTION/ DROPS OPHTHALMIC at 17:47

## 2019-09-29 RX ADMIN — PANTOPRAZOLE SODIUM SCH MG: 20 TABLET, DELAYED RELEASE ORAL at 21:27

## 2019-09-29 RX ADMIN — LEVETIRACETAM SCH MG: 500 TABLET, FILM COATED ORAL at 10:14

## 2019-09-29 RX ADMIN — LEVETIRACETAM SCH MG: 500 TABLET, FILM COATED ORAL at 21:27

## 2019-09-29 RX ADMIN — Medication SCH TAB: at 10:13

## 2019-09-29 RX ADMIN — ATOVAQUONE SCH: 750 SUSPENSION ORAL at 10:14

## 2019-09-29 RX ADMIN — ATORVASTATIN CALCIUM SCH MG: 20 TABLET, FILM COATED ORAL at 21:27

## 2019-09-29 RX ADMIN — Medication SCH MG: at 21:26

## 2019-09-29 RX ADMIN — TETRAHYDROZOLINE HCL SCH DROP: 0.05 SOLUTION/ DROPS OPHTHALMIC at 14:45

## 2019-09-29 RX ADMIN — ALUMINUM HYDROXIDE, MAGNESIUM HYDROXIDE, AND SIMETHICONE PRN ML: 200; 200; 20 SUSPENSION ORAL at 07:06

## 2019-09-29 RX ADMIN — QUETIAPINE FUMARATE SCH MG: 100 TABLET ORAL at 21:27

## 2019-09-29 RX ADMIN — TETRAHYDROZOLINE HCL SCH DROP: 0.05 SOLUTION/ DROPS OPHTHALMIC at 21:26

## 2019-09-29 RX ADMIN — NICOTINE SCH: 7 PATCH TRANSDERMAL at 10:14

## 2019-09-29 RX ADMIN — PANTOPRAZOLE SODIUM SCH MG: 20 TABLET, DELAYED RELEASE ORAL at 10:13

## 2019-09-29 RX ADMIN — ALUMINUM HYDROXIDE, MAGNESIUM HYDROXIDE, AND SIMETHICONE PRN ML: 200; 200; 20 SUSPENSION ORAL at 21:29

## 2019-09-29 RX ADMIN — BICTEGRAVIR SODIUM, EMTRICITABINE, AND TENOFOVIR ALAFENAMIDE FUMARATE SCH EACH: 50; 200; 25 TABLET ORAL at 07:04

## 2019-09-29 RX ADMIN — TETRAHYDROZOLINE HCL SCH DROP: 0.05 SOLUTION/ DROPS OPHTHALMIC at 10:14

## 2019-09-29 RX ADMIN — ATOVAQUONE SCH: 750 SUSPENSION ORAL at 21:28

## 2019-09-29 NOTE — EKG
Test Reason : 

Blood Pressure : ***/*** mmHG

Vent. Rate : 079 BPM     Atrial Rate : 079 BPM

   P-R Int : 152 ms          QRS Dur : 072 ms

    QT Int : 404 ms       P-R-T Axes : 067 021 057 degrees

   QTc Int : 463 ms

 

NORMAL SINUS RHYTHM

NORMAL ECG

WHEN COMPARED WITH ECG OF 18-SEP-2019 20:33,

NO SIGNIFICANT CHANGE WAS FOUND

Confirmed by LEXIE BERRIOS MD ([id number]) on 9/29/2019 4:55:56 PM

 

Referred By:             Confirmed By:LEXIE BERRIOS MD

## 2019-09-30 RX ADMIN — Medication SCH TAB: at 10:14

## 2019-09-30 RX ADMIN — ALUMINUM HYDROXIDE, MAGNESIUM HYDROXIDE, AND SIMETHICONE PRN ML: 200; 200; 20 SUSPENSION ORAL at 10:15

## 2019-09-30 RX ADMIN — BICTEGRAVIR SODIUM, EMTRICITABINE, AND TENOFOVIR ALAFENAMIDE FUMARATE SCH EACH: 50; 200; 25 TABLET ORAL at 08:14

## 2019-09-30 RX ADMIN — QUETIAPINE FUMARATE SCH MG: 100 TABLET ORAL at 21:23

## 2019-09-30 RX ADMIN — ATOVAQUONE SCH: 750 SUSPENSION ORAL at 21:24

## 2019-09-30 RX ADMIN — ATOVAQUONE SCH: 750 SUSPENSION ORAL at 10:29

## 2019-09-30 RX ADMIN — ALUMINUM HYDROXIDE, MAGNESIUM HYDROXIDE, AND SIMETHICONE PRN ML: 200; 200; 20 SUSPENSION ORAL at 21:25

## 2019-09-30 RX ADMIN — PANTOPRAZOLE SODIUM SCH MG: 20 TABLET, DELAYED RELEASE ORAL at 21:23

## 2019-09-30 RX ADMIN — TETRAHYDROZOLINE HCL SCH DROP: 0.05 SOLUTION/ DROPS OPHTHALMIC at 21:24

## 2019-09-30 RX ADMIN — Medication SCH MG: at 21:23

## 2019-09-30 RX ADMIN — TETRAHYDROZOLINE HCL SCH DROP: 0.05 SOLUTION/ DROPS OPHTHALMIC at 17:39

## 2019-09-30 RX ADMIN — ATORVASTATIN CALCIUM SCH MG: 20 TABLET, FILM COATED ORAL at 21:23

## 2019-09-30 RX ADMIN — LEVETIRACETAM SCH MG: 500 TABLET, FILM COATED ORAL at 21:23

## 2019-09-30 RX ADMIN — PANTOPRAZOLE SODIUM SCH MG: 20 TABLET, DELAYED RELEASE ORAL at 10:14

## 2019-09-30 RX ADMIN — TETRAHYDROZOLINE HCL SCH: 0.05 SOLUTION/ DROPS OPHTHALMIC at 14:55

## 2019-09-30 RX ADMIN — TETRAHYDROZOLINE HCL SCH DROP: 0.05 SOLUTION/ DROPS OPHTHALMIC at 10:29

## 2019-09-30 RX ADMIN — NICOTINE SCH: 7 PATCH TRANSDERMAL at 10:29

## 2019-09-30 RX ADMIN — LEVETIRACETAM SCH MG: 500 TABLET, FILM COATED ORAL at 10:13

## 2019-10-01 VITALS — HEART RATE: 84 BPM | SYSTOLIC BLOOD PRESSURE: 147 MMHG | TEMPERATURE: 98 F | DIASTOLIC BLOOD PRESSURE: 90 MMHG

## 2019-10-01 NOTE — DS
[facility] Discharge Summary





- [facility] Discharge Summary


Admission Date: 09/18/19


Discharge Date: 10/01/19





- History


Present History: Alcohol dependence, Cannabis dependence, Cocaine dependence


Additional Comments: 





63yo with h/o HIV, HTN, seizures and PUD, admitted for rehab services for 

cocaine, alcohol and marijuana. Pt will f/u with PCP for further services. PT 

did not want to have labs done today- Keppra and CBC- which were abnormal. Pt 

signed refusal.  Pt aware of the need to take Keppra to avoid seizures. 





- Discharge Physical Exam


Vital Signs: 


 Vital Signs











Temperature  98 F   10/01/19 06:58


 


Pulse Rate  84   10/01/19 06:58


 


Respiratory Rate  20   10/01/19 06:58


 


Blood Pressure  147/90   10/01/19 06:58


 


O2 Sat by Pulse Oximetry (%)      














- Treatment


Discharge Condition: Discharge condition good





- Medication


Discharge Medications: 


Ambulatory Orders





Atorvastatin Ca [Lipitor] 20 mg PO DAILY 09/18/19 


Atovaquone [Mepron -] 10 ml PO BID 09/18/19 


Famotidine 20 mg PO BID 09/18/19 


Gabapentin 600 mg PO TID 09/18/19 


Levetiracetam 500 mg PO BID 09/18/19 


Mag Hydrox/Aluminum Hyd/Simeth [Almacone Suspension] 355 ml PO QID PRN 09/18/19 


Multivitamins [Tab-A-Vit -] 1 tab PO DAILY 09/18/19 


Nicotine Patch [Nicoderm Patch -] 1 patch TD DAILY 09/18/19 


Quetiapine Fumarate [Seroquel -] 100 mg PO HS 09/18/19 


Sertraline HCl [Zoloft] 100 mg PO DAILY 09/18/19 


Valganciclovir HCl 900 mg PO BID 09/18/19 


Azithromycin [Zithromax 600mg Tablets -] 1,200 mg PO ONCE #10 tablet 09/19/19 


Bictegrav/Emtricit/Tenofov Ala [Biktarvy -25 mg Tablet] 1 each PO DAILY #

30 tablet 09/19/19 


Sulfamethoxazole/Trimethoprim [Bactrim Ds -] 1 tab PO DAILY #30 tablet 09/19/19 











- Medication-Assisted Treatment (MAT)


Medication-Assisted Treatment (MAT): No





- Discharge Instructions


Diet, activity, other medical instructions: 





Diet:





Activity: 





Other medical instructions:








- Diagnosis


(1) HIV (human immunodeficiency virus infection)


Current Visit: Yes   Status: Acute   





(2) Alcohol dependence


Current Visit: Yes   Status: Acute   





(3) Cocaine dependence


Current Visit: Yes   Status: Acute   





(4) Marijuana dependence


Current Visit: Yes   Status: Acute   





(5) Substance-induced sleep disorder


Current Visit: Yes   Status: Acute   





(6) Seizure


Current Visit: No   Status: Acute   





- Follow-up Referral


Minutes to complete discharge: 30





- AMA


Did Patient Leave Against Medical Advice: No

## 2021-03-05 ENCOUNTER — EMERGENCY (EMERGENCY)
Facility: HOSPITAL | Age: 66
LOS: 1 days | Discharge: HOME | End: 2021-03-05
Attending: EMERGENCY MEDICINE | Admitting: EMERGENCY MEDICINE
Payer: COMMERCIAL

## 2021-03-05 VITALS
HEART RATE: 85 BPM | OXYGEN SATURATION: 99 % | WEIGHT: 169.98 LBS | DIASTOLIC BLOOD PRESSURE: 82 MMHG | SYSTOLIC BLOOD PRESSURE: 138 MMHG | TEMPERATURE: 99 F | RESPIRATION RATE: 18 BRPM

## 2021-03-05 VITALS
OXYGEN SATURATION: 97 % | RESPIRATION RATE: 18 BRPM | TEMPERATURE: 98 F | DIASTOLIC BLOOD PRESSURE: 85 MMHG | HEART RATE: 83 BPM | SYSTOLIC BLOOD PRESSURE: 159 MMHG

## 2021-03-05 DIAGNOSIS — F19.10 OTHER PSYCHOACTIVE SUBSTANCE ABUSE, UNCOMPLICATED: ICD-10-CM

## 2021-03-05 DIAGNOSIS — R56.9 UNSPECIFIED CONVULSIONS: ICD-10-CM

## 2021-03-05 DIAGNOSIS — F14.10 COCAINE ABUSE, UNCOMPLICATED: ICD-10-CM

## 2021-03-05 DIAGNOSIS — F12.10 CANNABIS ABUSE, UNCOMPLICATED: ICD-10-CM

## 2021-03-05 DIAGNOSIS — G40.909 EPILEPSY, UNSPECIFIED, NOT INTRACTABLE, WITHOUT STATUS EPILEPTICUS: ICD-10-CM

## 2021-03-05 DIAGNOSIS — F10.10 ALCOHOL ABUSE, UNCOMPLICATED: ICD-10-CM

## 2021-03-05 LAB
ALBUMIN SERPL ELPH-MCNC: 3.1 G/DL — LOW (ref 3.3–5)
ALBUMIN SERPL ELPH-MCNC: 3.2 G/DL — LOW (ref 3.3–5)
ALP SERPL-CCNC: 36 U/L — LOW (ref 40–120)
ALP SERPL-CCNC: SIGNIFICANT CHANGE UP (ref 40–120)
ALT FLD-CCNC: 12 U/L — SIGNIFICANT CHANGE UP (ref 10–45)
ALT FLD-CCNC: SIGNIFICANT CHANGE UP (ref 10–45)
AMPHET UR-MCNC: NEGATIVE — SIGNIFICANT CHANGE UP
ANION GAP SERPL CALC-SCNC: 8 MMOL/L — SIGNIFICANT CHANGE UP (ref 5–17)
ANION GAP SERPL CALC-SCNC: 9 MMOL/L — SIGNIFICANT CHANGE UP (ref 5–17)
ANISOCYTOSIS BLD QL: SLIGHT — SIGNIFICANT CHANGE UP
AST SERPL-CCNC: 27 U/L — SIGNIFICANT CHANGE UP (ref 10–40)
AST SERPL-CCNC: SIGNIFICANT CHANGE UP (ref 10–40)
BARBITURATES UR SCN-MCNC: NEGATIVE — SIGNIFICANT CHANGE UP
BASOPHILS # BLD AUTO: 0 K/UL — SIGNIFICANT CHANGE UP (ref 0–0.2)
BASOPHILS NFR BLD AUTO: 0 % — SIGNIFICANT CHANGE UP (ref 0–2)
BENZODIAZ UR-MCNC: NEGATIVE — SIGNIFICANT CHANGE UP
BILIRUB SERPL-MCNC: 0.4 MG/DL — SIGNIFICANT CHANGE UP (ref 0.2–1.2)
BILIRUB SERPL-MCNC: 0.4 MG/DL — SIGNIFICANT CHANGE UP (ref 0.2–1.2)
BUN SERPL-MCNC: 21 MG/DL — SIGNIFICANT CHANGE UP (ref 7–23)
BUN SERPL-MCNC: 24 MG/DL — HIGH (ref 7–23)
BURR CELLS BLD QL SMEAR: PRESENT — SIGNIFICANT CHANGE UP
CALCIUM SERPL-MCNC: 8.4 MG/DL — SIGNIFICANT CHANGE UP (ref 8.4–10.5)
CALCIUM SERPL-MCNC: 8.8 MG/DL — SIGNIFICANT CHANGE UP (ref 8.4–10.5)
CHLORIDE SERPL-SCNC: 108 MMOL/L — SIGNIFICANT CHANGE UP (ref 96–108)
CHLORIDE SERPL-SCNC: 110 MMOL/L — HIGH (ref 96–108)
CK SERPL-CCNC: SIGNIFICANT CHANGE UP (ref 30–200)
CO2 SERPL-SCNC: 21 MMOL/L — LOW (ref 22–31)
CO2 SERPL-SCNC: 22 MMOL/L — SIGNIFICANT CHANGE UP (ref 22–31)
COCAINE METAB.OTHER UR-MCNC: POSITIVE
CREAT SERPL-MCNC: 1.52 MG/DL — HIGH (ref 0.5–1.3)
CREAT SERPL-MCNC: 1.54 MG/DL — HIGH (ref 0.5–1.3)
DACRYOCYTES BLD QL SMEAR: SLIGHT — SIGNIFICANT CHANGE UP
EOSINOPHIL # BLD AUTO: 0.05 K/UL — SIGNIFICANT CHANGE UP (ref 0–0.5)
EOSINOPHIL NFR BLD AUTO: 2.7 % — SIGNIFICANT CHANGE UP (ref 0–6)
ETHANOL SERPL-MCNC: <10 MG/DL — SIGNIFICANT CHANGE UP (ref 0–10)
GIANT PLATELETS BLD QL SMEAR: PRESENT — SIGNIFICANT CHANGE UP
GLUCOSE SERPL-MCNC: 100 MG/DL — HIGH (ref 70–99)
GLUCOSE SERPL-MCNC: 78 MG/DL — SIGNIFICANT CHANGE UP (ref 70–99)
HCT VFR BLD CALC: 31.2 % — LOW (ref 39–50)
HGB BLD-MCNC: 9.5 G/DL — LOW (ref 13–17)
LYMPHOCYTES # BLD AUTO: 0.43 K/UL — LOW (ref 1–3.3)
LYMPHOCYTES # BLD AUTO: 22.3 % — SIGNIFICANT CHANGE UP (ref 13–44)
MACROCYTES BLD QL: SLIGHT — SIGNIFICANT CHANGE UP
MANUAL SMEAR VERIFICATION: SIGNIFICANT CHANGE UP
MCHC RBC-ENTMCNC: 29.6 PG — SIGNIFICANT CHANGE UP (ref 27–34)
MCHC RBC-ENTMCNC: 30.4 GM/DL — LOW (ref 32–36)
MCV RBC AUTO: 97.2 FL — SIGNIFICANT CHANGE UP (ref 80–100)
METHADONE UR-MCNC: NEGATIVE — SIGNIFICANT CHANGE UP
MONOCYTES # BLD AUTO: 0.17 K/UL — SIGNIFICANT CHANGE UP (ref 0–0.9)
MONOCYTES NFR BLD AUTO: 8.9 % — SIGNIFICANT CHANGE UP (ref 2–14)
NEUTROPHILS # BLD AUTO: 1.28 K/UL — LOW (ref 1.8–7.4)
NEUTROPHILS NFR BLD AUTO: 66.1 % — SIGNIFICANT CHANGE UP (ref 43–77)
OPIATES UR-MCNC: NEGATIVE — SIGNIFICANT CHANGE UP
OVALOCYTES BLD QL SMEAR: SLIGHT — SIGNIFICANT CHANGE UP
PCP SPEC-MCNC: SIGNIFICANT CHANGE UP
PCP UR-MCNC: NEGATIVE — SIGNIFICANT CHANGE UP
PLAT MORPH BLD: ABNORMAL
PLATELET # BLD AUTO: 60 K/UL — LOW (ref 150–400)
POIKILOCYTOSIS BLD QL AUTO: SLIGHT — SIGNIFICANT CHANGE UP
POLYCHROMASIA BLD QL SMEAR: SLIGHT — SIGNIFICANT CHANGE UP
POTASSIUM SERPL-MCNC: 4.4 MMOL/L — SIGNIFICANT CHANGE UP (ref 3.5–5.3)
POTASSIUM SERPL-MCNC: SIGNIFICANT CHANGE UP (ref 3.5–5.3)
POTASSIUM SERPL-SCNC: 4.4 MMOL/L — SIGNIFICANT CHANGE UP (ref 3.5–5.3)
POTASSIUM SERPL-SCNC: SIGNIFICANT CHANGE UP (ref 3.5–5.3)
PROT SERPL-MCNC: 8.5 G/DL — HIGH (ref 6–8.3)
PROT SERPL-MCNC: 9.1 G/DL — HIGH (ref 6–8.3)
RBC # BLD: 3.21 M/UL — LOW (ref 4.2–5.8)
RBC # FLD: 15.8 % — HIGH (ref 10.3–14.5)
RBC BLD AUTO: ABNORMAL
SCHISTOCYTES BLD QL AUTO: SLIGHT — SIGNIFICANT CHANGE UP
SMUDGE CELLS # BLD: PRESENT — SIGNIFICANT CHANGE UP
SODIUM SERPL-SCNC: 138 MMOL/L — SIGNIFICANT CHANGE UP (ref 135–145)
SODIUM SERPL-SCNC: 140 MMOL/L — SIGNIFICANT CHANGE UP (ref 135–145)
THC UR QL: NEGATIVE — SIGNIFICANT CHANGE UP
WBC # BLD: 1.94 K/UL — LOW (ref 3.8–10.5)
WBC # FLD AUTO: 1.94 K/UL — LOW (ref 3.8–10.5)

## 2021-03-05 PROCEDURE — 71046 X-RAY EXAM CHEST 2 VIEWS: CPT | Mod: 26

## 2021-03-05 PROCEDURE — 36415 COLL VENOUS BLD VENIPUNCTURE: CPT

## 2021-03-05 PROCEDURE — 93005 ELECTROCARDIOGRAM TRACING: CPT

## 2021-03-05 PROCEDURE — 99285 EMERGENCY DEPT VISIT HI MDM: CPT

## 2021-03-05 PROCEDURE — 99284 EMERGENCY DEPT VISIT MOD MDM: CPT

## 2021-03-05 PROCEDURE — 93010 ELECTROCARDIOGRAM REPORT: CPT

## 2021-03-05 PROCEDURE — 82550 ASSAY OF CK (CPK): CPT

## 2021-03-05 PROCEDURE — 99283 EMERGENCY DEPT VISIT LOW MDM: CPT

## 2021-03-05 PROCEDURE — 80307 DRUG TEST PRSMV CHEM ANLYZR: CPT

## 2021-03-05 PROCEDURE — 80053 COMPREHEN METABOLIC PANEL: CPT

## 2021-03-05 PROCEDURE — 85025 COMPLETE CBC W/AUTO DIFF WBC: CPT

## 2021-03-05 PROCEDURE — 71046 X-RAY EXAM CHEST 2 VIEWS: CPT

## 2021-03-05 RX ORDER — LEVETIRACETAM 250 MG/1
500 TABLET, FILM COATED ORAL ONCE
Refills: 0 | Status: COMPLETED | OUTPATIENT
Start: 2021-03-05 | End: 2021-03-05

## 2021-03-05 RX ORDER — SODIUM CHLORIDE 9 MG/ML
1000 INJECTION INTRAMUSCULAR; INTRAVENOUS; SUBCUTANEOUS ONCE
Refills: 0 | Status: COMPLETED | OUTPATIENT
Start: 2021-03-05 | End: 2021-03-05

## 2021-03-05 RX ADMIN — SODIUM CHLORIDE 1000 MILLILITER(S): 9 INJECTION INTRAMUSCULAR; INTRAVENOUS; SUBCUTANEOUS at 17:04

## 2021-03-05 RX ADMIN — LEVETIRACETAM 500 MILLIGRAM(S): 250 TABLET, FILM COATED ORAL at 15:04

## 2021-03-05 NOTE — BEHAVIORAL HEALTH ASSESSMENT NOTE - NSBHCONSULTFOLLOWAFTERCARE_PSY_A_CORE FT
[facility]  [address], [place], [address]  [place], [address]  Telephone: [phone]    [address] [place] [address] Substance Treatment Program  ?	[facility]  ?	[place] [address]  ?	[phone]      o	[facility]  ?	[address] [facility]  ?	[place] [address]  ?	[phone]  can go Monday at 1pm

## 2021-03-05 NOTE — BEHAVIORAL HEALTH ASSESSMENT NOTE - NSBHCHARTREVIEWLAB_PSY_A_CORE FT
CBC Full  -  ( 05 Mar 2021 14:41 )  WBC Count : 1.94 K/uL  RBC Count : 3.21 M/uL  Hemoglobin : 9.5 g/dL  Hematocrit : 31.2 %  Platelet Count - Automated : 60 K/uL  Mean Cell Volume : 97.2 fl  Mean Cell Hemoglobin : 29.6 pg  Mean Cell Hemoglobin Concentration : 30.4 gm/dL  Auto Neutrophil # : 1.28 K/uL  Auto Lymphocyte # : 0.43 K/uL  Auto Monocyte # : 0.17 K/uL  Auto Eosinophil # : 0.05 K/uL  Auto Basophil # : 0.00 K/uL  Auto Neutrophil % : 66.1 %  Auto Lymphocyte % : 22.3 %  Auto Monocyte % : 8.9 %  Auto Eosinophil % : 2.7 %  Auto Basophil % : 0.0 %    03-05    140  |  110<H>  |  21  ----------------------------<  78  4.4   |  21<L>  |  1.54<H>    Ca    8.4      05 Mar 2021 17:07    TPro  8.5<H>  /  Alb  3.1<L>  /  TBili  0.4  /  DBili  x   /  AST  27  /  ALT  12  /  AlkPhos  36<L>  03-05

## 2021-03-05 NOTE — ED PROVIDER NOTE - OBJECTIVE STATEMENT
64 y/o male with hx of bipolar, HIV, seizure d/o, DM c/o seizure. pt states he had two seizures at his pmd's office today and sent to ED. Also pt notes he is suppose to go to "rehab" as per his pmd. pt denies head injury and states his pmd prevented him from falling to ground. no neck or back pain. no cp, sob, abd pain, n/v/d. no numbness, tingling or weakness. pt reports was in a  Hospital yesterday and that was the last time he took his seizure medication as he has not picked up meds from pharmacy today. no further complaints.

## 2021-03-05 NOTE — BEHAVIORAL HEALTH ASSESSMENT NOTE - HPI (INCLUDE ILLNESS QUALITY, SEVERITY, DURATION, TIMING, CONTEXT, MODIFYING FACTORS, ASSOCIATED SIGNS AND SYMPTOMS)
65M bipolar disorder, HIV, seizure disorder, DM, HTN, cocaine abuse, sent in by PMD for seizures in office, who expressed concern to ER staff that pt has been nonadherent with medication, using substances, possibly due to bipolar depression, seeking psychiatric eval and link to rehab.    Case d/w IVIS Ngo in [place]: concern of PMD, SW team.  No SI per ER team or PMD.    Pt assessed in ER.  He was alert, attentive, described having seizures which he felt was result of using alcohol, cocaine, and marijuana.  He describes monthly use of substances with seizures resulting thereafter.  He had a seizure in the PMD office today.  He described wanting rehab, desire to stop using substances, spoke at length about his connection with his 7 children, a new grandchild, etc, and his motivations.  He had been at [facility] on medical unit for seizures, discharged several days ago, no psychiatric services.  He reported having bipolar disorder but described no sx of mood episode, is not taking psychiatric Rx, stated he has a psychiatrist at same location as his PMD.  No hx SA.  He does not have access to a gun.  He has had occasional fleeting thoughts of not being alive, but emphasized his motivation for sobriety and need to be there for his family.  He described no sx or behaviors c/w acute suicidal state, hopelessness/worthlessness, diminished connections with others, wish to die or give up, or content otherwise suggestive of acute risk.  He has stable housing, studio apartment.  Emphasized resilience and agreeableness to go to alternative rehab locations if suggested.    No tremor, diaphoresis.  Isolated use of alcohol, cannabis, and cocaine several days ago; no use since.  No signs of acute withdrawal.

## 2021-03-05 NOTE — BEHAVIORAL HEALTH ASSESSMENT NOTE - COMMENTS ON SUICIDE RISK/PROTECTIVE FACTORS:
Acute risk is low at this time, though chronic risk is somewhat elevated due to his substance use especially.  His risk, including accidental harm to himself in context of substance use is present, though he is very motivated for sobriety.

## 2021-03-05 NOTE — ED PROVIDER NOTE - CARE PLAN
Principal Discharge DX:	Seizure   Principal Discharge DX:	Seizure  Secondary Diagnosis:	Substance abuse

## 2021-03-05 NOTE — ED PROVIDER NOTE - PROGRESS NOTE DETAILS
Spoke with pmd Dr Corral and concerned non compliance with meds, seizures and drug abuse due to his bipolar and worsening depression. psych consulted.

## 2021-03-05 NOTE — ED ADULT NURSE NOTE - NSIMPLEMENTINTERV_GEN_ALL_ED
Implemented All Fall with Harm Risk Interventions:  [place] to call system. Call bell, personal items and telephone within reach. Instruct patient to call for assistance. Room bathroom lighting operational. Non-slip footwear when patient is off stretcher. Physically safe environment: no spills, clutter or unnecessary equipment. Stretcher in lowest position, wheels locked, appropriate side rails in place. Provide visual cue, wrist band, yellow gown, etc. Monitor gait and stability. Monitor for mental status changes and reorient to person, place, and time. Review medications for side effects contributing to fall risk. Reinforce activity limits and safety measures with patient and family. Provide visual clues: red socks.

## 2021-03-05 NOTE — ED PROVIDER NOTE - NSFOLLOWUPINSTRUCTIONS_ED_ALL_ED_FT
See referral to detox centers and followup with your primary doctor next week.  Make sure to take your medicine every day as prescribed.  If you have any problems with followup, please call the ED Referral Coordinator at [phone].  Return to the ER if symptoms worsen or other concerns.     · Aftercare Recommendations	[facility]  [address], [place], [address]  [place], [place], [address]  Telephone: [phone]    [address] [place] [address] Substance Treatment Program  ?	[address] [facility]  ?	[place] [address]  ?	[phone]      [facility]  ?	[address] [facility]  ?	[place] [address]  ?	[phone]  can go Monday at 1pm      Seizure    A seizure is abnormal electrical activity in the brain; the specific cause may or may not be found. Prior to a seizure you may experience a warning sensation (aura) that may include fear, nausea, dizziness, and visual changes such as flashing lights of spots. Common symptoms during the seizure may include an altered mental status, rhythmic jerking movements, drooling, grunting, loss of bladder or bowel control, or tongue biting. After a seizure, you may feel confused and sleepy.     Do not swim, drive, operate machinery, or engage in any risky activity during which a seizure could cause further injury to you or others. Teach friends and family what to do if you HAVE a seizure which includes laying you on the ground with your head on a cushion and turning you to the side to keep your breathing passages clear in case of vomiting.    SEEK IMMEDIATE MEDICAL CARE IF YOU HAVE ANY OF THE FOLLOWING SYMPTOMS: seizure lasting over 5 minutes, not waking up or persistent altered mental status after the seizure, or more frequent or worsening seizures.    Substance Use Disorder  Substance use disorder occurs when a person's repeated use of drugs or alcohol interferes with his or her ability to be productive. This disorder can cause problems with mental and physical health. It can affect your ability to have healthy relationships, and it can keep you from being able to meet your responsibilities at work, home, or school. It can also lead to addiction, which is a condition in which the person cannot stop using the substance consistently for a period of time.    The most commonly abused substances include:  Alcohol.  Tobacco.  Marijuana.  Stimulants, such as cocaine and methamphetamine.  Hallucinogens, such as LSD and PCP.  Opioids, such as some prescription pain medicines and heroin.  What are the causes?  This condition may develop due to many complex social, psychological, or physical reasons, such as:  Stress.  Abuse.  Peer pressure.  Anxiety or depression.  What increases the risk?  This condition is more likely to develop in people who:  Use substances to cope with stress.  Have been abused.  Have a mental health disorder, such as depression.  Have a family history of substance use disorder.  What are the signs or symptoms?  Symptoms of this condition include:  Using the substance for longer periods of time or at a higher dosage than what is normal or intended.  Having a lasting desire to use the substance.  Being unable to slow down or stop your use of the substance.  Spending an abnormal amount of time getting the substance, using the substance, or recovering from using the substance.  Craving the substance.  Using the substance in a way that interferes with work, school, social activities, and personal relationships.  Using the substance even after having negative consequences, such as:  Health problems.  Legal or financial troubles.  Job loss.  Broken relationships.  Needing more and more of the substance to get the same effect (developing tolerance).  Experiencing unpleasant symptoms if you do not use the substance (withdrawal).  Using the substance to avoid withdrawal.  How is this diagnosed?  This condition may be diagnosed based on:  A physical exam.  Your history of substance use.  Your symptoms. This includes:  How substance use affects your life.  Changes in personality, behaviors, and mood.  Having at least two symptoms of substance use disorder within a 12-month period.  Health issues related to substance use, such as liver damage, shortness of breath, fatigue, cough, or heart problems.  Blood or urine tests to screen for alcohol and drugs.  How is this treated?  ImageThis condition may be treated by:  Stopping substance use safely. This may require taking medicines and being closely observed for several days.  Taking part in group and individual counseling from mental health providers who help people with substance use disorder.  Staying at a live-in (residential) treatment center for several days or weeks.  Attending daily counseling sessions at a treatment center.  Taking medicine as told by your health care provider:  To ease symptoms and prevent complications during withdrawal.  To treat other mental health issues, such as depression or anxiety.  To block cravings by causing the same effects as the substance.  To block the effects of the substance or replace good sensations with unpleasant ones.  Going to a support group to share your experience with others who are going through the same thing.  Recovery can be a long process. Many people who undergo treatment start using the substance again after stopping (relapse). If you relapse, that does not mean that treatment will not work.    Follow these instructions at home:    Take over-the-counter and prescription medicines only as told by your health care provider.  Do not use any drugs or alcohol.  Attend support groups as needed. These groups, including 12-step programs like Alcoholics Anonymous and Narcotics Anonymous, are an important part of long-term recovery for many people.  Keep all follow-up visits as told by your health care providers. This is important. This includes continuing to work with therapists and support groups.  Contact a health care provider if:  You cannot take your medicines as told.  Your symptoms get worse.  You have trouble resisting the urge to use drugs or alcohol.  Get help right away if you:  Relapse.  Think that you may have taken too much of a drug. The hotline of the National Poison Control Center is (556) 496-8643.  Have signs of an overdose. Symptoms include:  Chest pain.  Confusion.  Sleepiness or difficulty staying awake.  Slowed breathing.  Nausea or vomiting.  A seizure.  Have serious thoughts about hurting yourself or someone else.  Drug overdose is an emergency. Do not wait to see if the symptoms will go away. Get medical help right away. Call your local emergency services (096 in the [place] Do not drive yourself to the hospital.     If you ever feel like you may hurt yourself or others, or have thoughts about taking your own life, get help right away. You can go to your nearest emergency department or call:   Your local emergency services (842 in the [place]   A suicide crisis helpline, such as the National Suicide Prevention Lifeline at 1-723.652.3992. This is open 24 hours a day.   Summary  Substance use disorder occurs when a person's repeated use of drugs or alcohol interferes with his or her ability to be productive. It can affect your ability to have healthy relationships, keep you from being able to meet your responsibilities at work, home, or school, and lead to addiction.  Taking part in group and individual counseling from mental health providers is one possible treatment for people with substance use disorder.  Recovery can be a long process. Many people who undergo treatment start using the substance again after stopping (relapse). A relapse does not mean that treatment will not work.  Attend support groups as needed, such as Alcoholics Anonymous and Narcotics Anonymous. These groups are an important part of long-term recovery for many people.  This information is not intended to replace advice given to you by your health care provider. Make sure you discuss any questions you have with your health care provider.

## 2021-03-05 NOTE — ED PROVIDER NOTE - ATTENDING CONTRIBUTION TO CARE
64 y/o male with hx of bipolar, HIV, seizure d/o, DM c/o seizure. Pt states two seizures at pmd's office today then referred to ED. Pt states to go to rehab after. states has refill of medications. denies head trauma. no fever, neck pain, meningismus, back pain or other complaints.  CONSTITUTIONAL: Awake, alert and in no apparent distress.  HEENT: Head is atraumatic. Eyes clear bilaterally, normal EOMI. Airway patent.  CARDIAC: Normal rate, regular rhythm.  Heart sounds S1, S2.   RESPIRATORY: Breath sounds clear and equal bilaterally. no tachypnea, respiratory distress.   GASTROINTESTINAL: Abdomen soft, non-tender, no guarding, distension.  MUSCULOSKELETAL: Spine appears normal, no midline spinal tenderness, range of motion is not limited, no muscle or joint tenderness. no bony tenderness.   NEUROLOGICAL: Alert, no focal deficits, no motor or sensory deficits.  SKIN: Skin normal color for race, warm, dry and intact. No evidence of rash.  PSYCHIATRIC: Normal mood and affect. no apparent risk to self or others.  Seizure episode, hx of HIV/AIDS. Hds, will obtain labs. Disc w pmd after, requesting psych consult given med non compliance ?depression, will obtain. S/o to Dr. Jimenez.

## 2021-03-05 NOTE — BEHAVIORAL HEALTH ASSESSMENT NOTE - RISK ASSESSMENT
Acute suicide risk is low, as described above, and if pt becomes suicidal he can return to the hospital.  No grounds to hold involuntarily, and no indication for inpatient psychiatric admission. Low Acute Suicide Risk

## 2021-03-05 NOTE — BEHAVIORAL HEALTH ASSESSMENT NOTE - SUMMARY
65M bipolar disorder, HIV, seizure disorder, DM, HTN, cocaine abuse, sent in by PMD for seizures in office, who expressed concern to ER staff that pt has been nonadherent with medication, using substances, possibly due to bipolar depression, seeking psychiatric eval and link to rehab.    Overall, pt does not appear to be in MDE, omaira, or with acute anxiety, akathisia, intoxication or withdrawal.  No elicited or endorsed psychotic sx.  No evidence for delirium.  He identifies substance use problems and is seeking rehab to address this.  Will offer resources with [facility].

## 2021-03-05 NOTE — BEHAVIORAL HEALTH ASSESSMENT NOTE - SUICIDE PROTECTIVE FACTORS
Responsibility to family and others/Identifies reasons for living/Has future plans/Supportive social network of family or friends/Positive therapeutic relationships/Ability to cope with stress/Frustration tolerance

## 2021-03-05 NOTE — ED PROVIDER NOTE - CLINICAL SUMMARY MEDICAL DECISION MAKING FREE TEXT BOX
66 y/o male with hx of bipolar, HIV, seizure d/o, DM c/o seizure. pt states he had two seizures at his pmd's office today and sent to ED. Also pt notes he is suppose to go to "rehab" as per his pmd. pt denies head injury and states his pmd prevented him from falling to ground. no neck or back pain. no cp, sob, abd pain, n/v/d. no numbness, tingling or weakness. pt reports was in a  Hospital yesterday and that was the last time he took his seizure medication as he has not picked up meds from pharmacy today. no further complaints. no medical reason for pt to be admitted to the hospital today. pt also seen and evaluated by psychiatrist on call, d/c home for out pt f/u recommended.

## 2021-03-05 NOTE — ED ADULT NURSE NOTE - PMH
Bipolar disorder    Cocaine use    Diabetes type 2, uncontrolled    Essential hypertension    HIV (human immunodeficiency virus infection)

## 2021-03-05 NOTE — BEHAVIORAL HEALTH ASSESSMENT NOTE - NSBHCHARTREVIEWVS_PSY_A_CORE FT
Vital Signs Last 24 Hrs  T(C): 37.1 (05 Mar 2021 14:07), Max: 37.1 (05 Mar 2021 14:07)  T(F): 98.7 (05 Mar 2021 14:07), Max: 98.7 (05 Mar 2021 14:07)  HR: 85 (05 Mar 2021 14:07) (85 - 85)  BP: 138/82 (05 Mar 2021 14:07) (138/82 - 138/82)  BP(mean): --  RR: 18 (05 Mar 2021 14:07) (18 - 18)  SpO2: 99% (05 Mar 2021 14:07) (99% - 99%)

## 2021-03-05 NOTE — ED PROVIDER NOTE - PATIENT PORTAL LINK FT
You can access the FollowMyHealth Patient Portal offered by [facility] by registering at the following website: [email or web address]. By joining [facility]’s FollowMyHealth portal, you will also be able to view your health information using other applications (apps) compatible with our system.

## 2021-03-05 NOTE — ED ADULT TRIAGE NOTE - CHIEF COMPLAINT QUOTE
66 y/o male BIBEMS form PMDs office for evaluation of seizure today. Pt non adherent to HIV and seizure medications.

## 2021-03-09 ENCOUNTER — FORM ENCOUNTER (OUTPATIENT)
Age: 66
End: 2021-03-09

## 2021-03-09 ENCOUNTER — INPATIENT (INPATIENT)
Facility: HOSPITAL | Age: 66
LOS: 7 days | Discharge: NURSING, REHAB OR LONG-TERM CARE | DRG: 101 | End: 2021-03-17
Attending: STUDENT IN AN ORGANIZED HEALTH CARE EDUCATION/TRAINING PROGRAM
Payer: COMMERCIAL

## 2021-03-09 VITALS
HEART RATE: 100 BPM | SYSTOLIC BLOOD PRESSURE: 145 MMHG | RESPIRATION RATE: 18 BRPM | TEMPERATURE: 98 F | WEIGHT: 160.06 LBS | OXYGEN SATURATION: 100 % | DIASTOLIC BLOOD PRESSURE: 91 MMHG

## 2021-03-09 DIAGNOSIS — B20 HUMAN IMMUNODEFICIENCY VIRUS [HIV] DISEASE: ICD-10-CM

## 2021-03-09 DIAGNOSIS — R56.9 UNSPECIFIED CONVULSIONS: ICD-10-CM

## 2021-03-09 DIAGNOSIS — R63.8 OTHER SYMPTOMS AND SIGNS CONCERNING FOOD AND FLUID INTAKE: ICD-10-CM

## 2021-03-09 DIAGNOSIS — Z29.9 ENCOUNTER FOR PROPHYLACTIC MEASURES, UNSPECIFIED: ICD-10-CM

## 2021-03-09 DIAGNOSIS — D47.3 ESSENTIAL (HEMORRHAGIC) THROMBOCYTHEMIA: ICD-10-CM

## 2021-03-09 DIAGNOSIS — I10 ESSENTIAL (PRIMARY) HYPERTENSION: ICD-10-CM

## 2021-03-09 DIAGNOSIS — F19.10 OTHER PSYCHOACTIVE SUBSTANCE ABUSE, UNCOMPLICATED: ICD-10-CM

## 2021-03-09 DIAGNOSIS — E11.65 TYPE 2 DIABETES MELLITUS WITH HYPERGLYCEMIA: ICD-10-CM

## 2021-03-09 PROBLEM — F31.9 BIPOLAR DISORDER, UNSPECIFIED: Chronic | Status: ACTIVE | Noted: 2021-03-05

## 2021-03-09 PROBLEM — F14.90 COCAINE USE, UNSPECIFIED, UNCOMPLICATED: Chronic | Status: ACTIVE | Noted: 2021-03-05

## 2021-03-09 LAB
ALBUMIN SERPL ELPH-MCNC: 3.1 G/DL — LOW (ref 3.3–5)
ALP SERPL-CCNC: 36 U/L — LOW (ref 40–120)
ALT FLD-CCNC: 12 U/L — SIGNIFICANT CHANGE UP (ref 10–45)
ANION GAP SERPL CALC-SCNC: 8 MMOL/L — SIGNIFICANT CHANGE UP (ref 5–17)
APPEARANCE UR: CLEAR — SIGNIFICANT CHANGE UP
AST SERPL-CCNC: 23 U/L — SIGNIFICANT CHANGE UP (ref 10–40)
BASOPHILS # BLD AUTO: 0 K/UL — SIGNIFICANT CHANGE UP (ref 0–0.2)
BASOPHILS NFR BLD AUTO: 0 % — SIGNIFICANT CHANGE UP (ref 0–2)
BILIRUB SERPL-MCNC: 0.4 MG/DL — SIGNIFICANT CHANGE UP (ref 0.2–1.2)
BILIRUB UR-MCNC: NEGATIVE — SIGNIFICANT CHANGE UP
BUN SERPL-MCNC: 20 MG/DL — SIGNIFICANT CHANGE UP (ref 7–23)
CALCIUM SERPL-MCNC: 8.6 MG/DL — SIGNIFICANT CHANGE UP (ref 8.4–10.5)
CHLORIDE SERPL-SCNC: 109 MMOL/L — HIGH (ref 96–108)
CO2 SERPL-SCNC: 21 MMOL/L — LOW (ref 22–31)
COLOR SPEC: YELLOW — SIGNIFICANT CHANGE UP
CREAT SERPL-MCNC: 1.51 MG/DL — HIGH (ref 0.5–1.3)
DIFF PNL FLD: ABNORMAL
EOSINOPHIL # BLD AUTO: 0.04 K/UL — SIGNIFICANT CHANGE UP (ref 0–0.5)
EOSINOPHIL NFR BLD AUTO: 1.9 % — SIGNIFICANT CHANGE UP (ref 0–6)
ETHANOL SERPL-MCNC: <10 MG/DL — SIGNIFICANT CHANGE UP (ref 0–10)
GLUCOSE BLDC GLUCOMTR-MCNC: 85 MG/DL — SIGNIFICANT CHANGE UP (ref 70–99)
GLUCOSE SERPL-MCNC: 90 MG/DL — SIGNIFICANT CHANGE UP (ref 70–99)
GLUCOSE UR QL: NEGATIVE — SIGNIFICANT CHANGE UP
HCT VFR BLD CALC: 31.7 % — LOW (ref 39–50)
HGB BLD-MCNC: 9.6 G/DL — LOW (ref 13–17)
INR BLD: 0.97 — SIGNIFICANT CHANGE UP (ref 0.88–1.16)
KETONES UR-MCNC: NEGATIVE — SIGNIFICANT CHANGE UP
LACTATE SERPL-SCNC: 1.8 MMOL/L — SIGNIFICANT CHANGE UP (ref 0.5–2)
LEUKOCYTE ESTERASE UR-ACNC: NEGATIVE — SIGNIFICANT CHANGE UP
LYMPHOCYTES # BLD AUTO: 0.37 K/UL — LOW (ref 1–3.3)
LYMPHOCYTES # BLD AUTO: 16.8 % — SIGNIFICANT CHANGE UP (ref 13–44)
MCHC RBC-ENTMCNC: 29.4 PG — SIGNIFICANT CHANGE UP (ref 27–34)
MCHC RBC-ENTMCNC: 30.3 GM/DL — LOW (ref 32–36)
MCV RBC AUTO: 96.9 FL — SIGNIFICANT CHANGE UP (ref 80–100)
MONOCYTES # BLD AUTO: 0.24 K/UL — SIGNIFICANT CHANGE UP (ref 0–0.9)
MONOCYTES NFR BLD AUTO: 11.2 % — SIGNIFICANT CHANGE UP (ref 2–14)
NEUTROPHILS # BLD AUTO: 1.49 K/UL — LOW (ref 1.8–7.4)
NEUTROPHILS NFR BLD AUTO: 65.4 % — SIGNIFICANT CHANGE UP (ref 43–77)
NITRITE UR-MCNC: NEGATIVE — SIGNIFICANT CHANGE UP
PCP SPEC-MCNC: SIGNIFICANT CHANGE UP
PH UR: 6 — SIGNIFICANT CHANGE UP (ref 5–8)
PLATELET # BLD AUTO: 66 K/UL — LOW (ref 150–400)
POTASSIUM SERPL-MCNC: 4.7 MMOL/L — SIGNIFICANT CHANGE UP (ref 3.5–5.3)
POTASSIUM SERPL-SCNC: 4.7 MMOL/L — SIGNIFICANT CHANGE UP (ref 3.5–5.3)
PROT SERPL-MCNC: 9.2 G/DL — HIGH (ref 6–8.3)
PROT UR-MCNC: 30 MG/DL
PROTHROM AB SERPL-ACNC: 11.7 SEC — SIGNIFICANT CHANGE UP (ref 10.6–13.6)
RBC # BLD: 3.27 M/UL — LOW (ref 4.2–5.8)
RBC # FLD: 15.4 % — HIGH (ref 10.3–14.5)
SARS-COV-2 RNA SPEC QL NAA+PROBE: SIGNIFICANT CHANGE UP
SODIUM SERPL-SCNC: 138 MMOL/L — SIGNIFICANT CHANGE UP (ref 135–145)
SP GR SPEC: 1.02 — SIGNIFICANT CHANGE UP (ref 1–1.03)
UROBILINOGEN FLD QL: 0.2 E.U./DL — SIGNIFICANT CHANGE UP
WBC # BLD: 2.18 K/UL — LOW (ref 3.8–10.5)
WBC # FLD AUTO: 2.18 K/UL — LOW (ref 3.8–10.5)

## 2021-03-09 PROCEDURE — 71045 X-RAY EXAM CHEST 1 VIEW: CPT | Mod: 26

## 2021-03-09 PROCEDURE — 99285 EMERGENCY DEPT VISIT HI MDM: CPT

## 2021-03-09 PROCEDURE — 70450 CT HEAD/BRAIN W/O DYE: CPT | Mod: 26,MA

## 2021-03-09 PROCEDURE — 74176 CT ABD & PELVIS W/O CONTRAST: CPT | Mod: 26,MA

## 2021-03-09 PROCEDURE — 99223 1ST HOSP IP/OBS HIGH 75: CPT | Mod: GC

## 2021-03-09 PROCEDURE — 93010 ELECTROCARDIOGRAM REPORT: CPT

## 2021-03-09 RX ORDER — FAMOTIDINE 10 MG/ML
20 INJECTION INTRAVENOUS ONCE
Refills: 0 | Status: DISCONTINUED | OUTPATIENT
Start: 2021-03-09 | End: 2021-03-09

## 2021-03-09 RX ORDER — SODIUM CHLORIDE 9 MG/ML
1000 INJECTION, SOLUTION INTRAVENOUS
Refills: 0 | Status: DISCONTINUED | OUTPATIENT
Start: 2021-03-09 | End: 2021-03-17

## 2021-03-09 RX ORDER — DEXTROSE 50 % IN WATER 50 %
12.5 SYRINGE (ML) INTRAVENOUS ONCE
Refills: 0 | Status: DISCONTINUED | OUTPATIENT
Start: 2021-03-09 | End: 2021-03-17

## 2021-03-09 RX ORDER — PANTOPRAZOLE SODIUM 20 MG/1
40 TABLET, DELAYED RELEASE ORAL
Refills: 0 | Status: DISCONTINUED | OUTPATIENT
Start: 2021-03-09 | End: 2021-03-17

## 2021-03-09 RX ORDER — GLUCAGON INJECTION, SOLUTION 0.5 MG/.1ML
1 INJECTION, SOLUTION SUBCUTANEOUS ONCE
Refills: 0 | Status: DISCONTINUED | OUTPATIENT
Start: 2021-03-09 | End: 2021-03-17

## 2021-03-09 RX ORDER — DEXTROSE 50 % IN WATER 50 %
15 SYRINGE (ML) INTRAVENOUS ONCE
Refills: 0 | Status: DISCONTINUED | OUTPATIENT
Start: 2021-03-09 | End: 2021-03-17

## 2021-03-09 RX ORDER — INSULIN LISPRO 100/ML
VIAL (ML) SUBCUTANEOUS
Refills: 0 | Status: DISCONTINUED | OUTPATIENT
Start: 2021-03-09 | End: 2021-03-17

## 2021-03-09 RX ORDER — DEXTROSE 50 % IN WATER 50 %
25 SYRINGE (ML) INTRAVENOUS ONCE
Refills: 0 | Status: DISCONTINUED | OUTPATIENT
Start: 2021-03-09 | End: 2021-03-17

## 2021-03-09 RX ORDER — LEVETIRACETAM 250 MG/1
500 TABLET, FILM COATED ORAL
Refills: 0 | Status: DISCONTINUED | OUTPATIENT
Start: 2021-03-10 | End: 2021-03-10

## 2021-03-09 RX ORDER — ENOXAPARIN SODIUM 100 MG/ML
40 INJECTION SUBCUTANEOUS EVERY 24 HOURS
Refills: 0 | Status: DISCONTINUED | OUTPATIENT
Start: 2021-03-09 | End: 2021-03-10

## 2021-03-09 RX ORDER — AMLODIPINE BESYLATE 2.5 MG/1
5 TABLET ORAL DAILY
Refills: 0 | Status: DISCONTINUED | OUTPATIENT
Start: 2021-03-09 | End: 2021-03-17

## 2021-03-09 RX ORDER — SODIUM CHLORIDE 9 MG/ML
500 INJECTION INTRAMUSCULAR; INTRAVENOUS; SUBCUTANEOUS ONCE
Refills: 0 | Status: COMPLETED | OUTPATIENT
Start: 2021-03-09 | End: 2021-03-09

## 2021-03-09 RX ORDER — LEVETIRACETAM 250 MG/1
1000 TABLET, FILM COATED ORAL ONCE
Refills: 0 | Status: COMPLETED | OUTPATIENT
Start: 2021-03-09 | End: 2021-03-09

## 2021-03-09 RX ADMIN — LEVETIRACETAM 400 MILLIGRAM(S): 250 TABLET, FILM COATED ORAL at 15:16

## 2021-03-09 RX ADMIN — ENOXAPARIN SODIUM 40 MILLIGRAM(S): 100 INJECTION SUBCUTANEOUS at 22:15

## 2021-03-09 RX ADMIN — SODIUM CHLORIDE 500 MILLILITER(S): 9 INJECTION INTRAMUSCULAR; INTRAVENOUS; SUBCUTANEOUS at 15:10

## 2021-03-09 NOTE — H&P ADULT - ATTENDING COMMENTS
Patient was set up for rehab center by his SW – and was due to be admitted there, but had a seizure. Essentially, requiring clearance prior to safe transfer to center.     -Known medication noncompliance as well as drug use, which is confounding.   -No lactate elevation on arrival, but reporting urinary incontinence and was noted to be post-ictal.   -Obtain Keppra level   -C/w 500mg BID   -vEEG  -F/u w/ Epilepsy     -HIV Consult – f/u CD4 and VL  -Suspect pancytopenia (and protein gap) are related to poorly controlled HIV disease.

## 2021-03-09 NOTE — H&P ADULT - NSHPPHYSICALEXAM_GEN_ALL_CORE
VITAL SIGNS:  T(C): 36.8 (03-09-21 @ 19:18), Max: 36.9 (03-09-21 @ 14:32)  T(F): 98.2 (03-09-21 @ 19:18), Max: 98.4 (03-09-21 @ 14:32)  HR: 79 (03-09-21 @ 19:18) (72 - 100)  BP: 162/102 (03-09-21 @ 19:18) (145/91 - 162/102)  RR: 18 (03-09-21 @ 19:18) (18 - 18)  SpO2: 100% (03-09-21 @ 19:18) (100% - 100%)    PHYSICAL EXAM:  Constitutional: Ill appearing, angry individual   Head: NC/AT  Eyes: PERRL, EOMI, anicteric sclera  ENT: MMM  Neck: supple; no JVD or thyromegaly  Respiratory: CTA B/L; no W/R/R, no retractions  Cardiac: +S1/S2; RRR; no M/R/G; PMI non-displaced  Gastrointestinal: abdomen soft, NT/ND; no rebound or guarding  Extremities: WWP, no clubbing or cyanosis; no peripheral edema  Musculoskeletal: NROM x4, tremor in left hand  Neurologic: AAOx3; CNII-XII grossly intact; no focal deficits  Psychiatric: irritated

## 2021-03-09 NOTE — ED PROVIDER NOTE - CONSTITUTIONAL, MLM
normal... Disheveled, poor hygiene. Confused but awake, alert, oriented to person, place, time/situation. In mild distress.

## 2021-03-09 NOTE — H&P ADULT - PROBLEM SELECTOR PLAN 2
Patient with a history of DM type 2, reports he takes no medications for this at home.  - A1c with AM labs  - mISS for now, no need to start nutritional or basal insulin yet

## 2021-03-09 NOTE — H&P ADULT - HISTORY OF PRESENT ILLNESS
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 Patient reports he was supposed to go to drug rehabilitation for substance abuse today, however he reports he had a seizure today. Reports incontinence, loss of consciousness reports he does not know how long symptoms went on for. Reports he did not fall or hit his head during seizure event. Patient reports he has been intermittently compliant with his medications and has taken his Keppra intermittently over the past 2 weeks. Reports headache and abdominal pain. ROS otherwise negative.     ED course - VS /91, , RR 18, T 98.4, SpO2 100% ORA. CBC with Hgb 9.6/Hct 31.7, Plt 66. Coags normal. BMP notable with Cl 109, Cr 1.51 (similar to previous hospitalizations). UTox performed and positive for cannabis and cocaine. COVID pcr negative. Blood alcohol negative. CXR without pathology. CT head without pathology. CT abdomen performed and with sigmoid diverticulosis/possible diverticulitis however no WBC count or fever. Patient given 1000 mg keppra IV and 500 mL NS and admitted to medicine for further evaluation and treatment.

## 2021-03-09 NOTE — ED ADULT TRIAGE NOTE - OTHER COMPLAINTS
biba from home per EMS had unwitnessed seizure and fall today at home.  pt has hx of seizures, has not been able to take keppra.  EMS reports apartment conditions are very poor.

## 2021-03-09 NOTE — ED PROVIDER NOTE - NEUROLOGICAL, MLM
Alert and oriented, no focal deficits, + left sided weakness or sensory deficits. Alert and oriented, no focal deficits, + right sided weakness or sensory deficits.

## 2021-03-09 NOTE — ED PROVIDER NOTE - NEURO NEGATIVE STATEMENT, MLM
+ loss of consciousness, no gait abnormality, no headache, no sensory deficits, + LUE and LLE weakness. + loss of consciousness, no gait abnormality, no headache, no sensory deficits, + RUE and RLE weakness.

## 2021-03-09 NOTE — ED ADULT TRIAGE NOTE - PRO INTERPRETER NEED 2
----- Message from Carmine Phipps English

## 2021-03-09 NOTE — ED PROVIDER NOTE - CARDIAC, MLM
Normal rate, regular rhythm.  Heart sounds S1, S2.  No murmurs, rubs or gallops. Chest is diffusely tender to palpation.

## 2021-03-09 NOTE — H&P ADULT - ASSESSMENT
65M lives in shelter PMH HIV (unclear if patient takes medications), EtOH abuse last about 2 weeks ago), substance abuse of cocaine and cannabis), HTN, MI in past, CVA x2 (residual right sided weakness), epilepsy presents after reported seizure. Received Keppra in ED and admitted to medicine for further evaluation and treatment.

## 2021-03-09 NOTE — H&P ADULT - PROBLEM SELECTOR PLAN 7
F - none  E - replete PRN  N - diabetic dash/tlc  A - RMF DVT ppx - Lovenox  GI ppx - Pantoprazole 40 mg PO Qd

## 2021-03-09 NOTE — H&P ADULT - PROBLEM SELECTOR PLAN 6
DVT ppx - Lovenox  GI ppx - Pantoprazole 40 mg PO Qd Patient reports he abuses crack cocaine and marijuana. As per patient he was planning to go to drug rehabilitation center today, however had seizure prior to presentation to clinic. UTox positive for cocaine metabolites and cannabinoid metabolites.  - Continue to monitor, NTD at this time

## 2021-03-09 NOTE — H&P ADULT - PROBLEM SELECTOR PLAN 4
Patient with a history of hypertension. Reports he takes 'a blood pressure pill' at home 'sometimes'. Does not check ambulatory BPs. Patient pressure on admission is hypertensive to 162/102.  - Start amlodipine 5 mg PO Qd for now Patient with thrombopenia. Possible causes of thrombocytopenia include EtOH abuse vs HIV infection.   - F/u AM CBC and HIV labs

## 2021-03-09 NOTE — H&P ADULT - PROBLEM SELECTOR PLAN 1
Patient with a longstanding history of seizures, is supposed to take Keppra and reports he takes 'two two times a day', however reports he has not been taking his medications for the past 2 weeks. Given 1000 mg Keppra in the ED.   - vEEG ordered  - Keppra level as add on lab  - Will start keppra 500 mg PO BID  - ativan 2 mg IV PRN for seizure activity

## 2021-03-09 NOTE — ED ADULT NURSE NOTE - OBJECTIVE STATEMENT
pt is a 66 y/o M arriving to ED from home for c/c seizure. per EMS, pt had unwitnessed seizure and fall today at home. pt has hx of seizures (has not been able to take Keppra) and x 2 CVA with Lt sided deficit. pt arriving to ED A&O x 3, states he felt slightly confused after seizure. Pt currently endorsing mild midsternal chest pain. EMS reports apartment conditions are very poor. Per pt, his  had plans for him to be admitted to rehab facility. ambulates with walker at baseline.

## 2021-03-09 NOTE — ED PROVIDER NOTE - ATTENDING CONTRIBUTION TO CARE
Attending Statement: I have personally performed a face to face diagnostic evaluation on this patient. I have reviewed the medical student note and agree with the history, exam and plan of care, except as noted.     Attending Contribution to Care:  65M lives in shelter PMH HIV, EtOH abuse last about 2 weeks ago), substance abuse of cocaine and cannabis), HTN, MI in past, CVA x2 (residual right sided weakness), epilepsy with hx of medication noncompliance and multiple recent ER visits for sz and substance abuse who presents after reported seizure. Also c/o epigastric discomfort , likely related to gastritis. CT head and s/p negative, Utox + for cocaine and THC, Pt loaded with 1g IV Keppra in ED, labs with no change from recent visit, SW/CM consulted and pt admitted to further mgmt of sz, substance abuse, FTT, and possible transfer to  for combines physical and substance abuse rehab.

## 2021-03-09 NOTE — H&P ADULT - NSHPLABSRESULTS_GEN_ALL_CORE
.  LABS:                         9.6    2.18  )-----------( 66       ( 09 Mar 2021 14:57 )             31.7         138  |  109<H>  |  20  ----------------------------<  90  4.7   |  21<L>  |  1.51<H>    Ca    8.6      09 Mar 2021 14:57    TPro  9.2<H>  /  Alb  3.1<L>  /  TBili  0.4  /  DBili  x   /  AST  23  /  ALT  12  /  AlkPhos  36<L>      PT/INR - ( 09 Mar 2021 14:57 )   PT: 11.7 sec;   INR: 0.97            Urinalysis Basic - ( 09 Mar 2021 15:54 )    Color: Yellow / Appearance: Clear / S.025 / pH: x  Gluc: x / Ketone: NEGATIVE  / Bili: Negative / Urobili: 0.2 E.U./dL   Blood: x / Protein: 30 mg/dL / Nitrite: NEGATIVE   Leuk Esterase: NEGATIVE / RBC: < 5 /HPF / WBC < 5 /HPF   Sq Epi: x / Non Sq Epi: x / Bacteria: Few /HPF      CARDIAC MARKERS ( 09 Mar 2021 14:57 )  x     / 0.01 ng/mL / x     / x     / x            Lactate, Blood: 1.8 mmol/L ( @ 14:56)      RADIOLOGY, EKG & ADDITIONAL TESTS: CT abdomen and CT head performed and reviewed

## 2021-03-09 NOTE — H&P ADULT - PROBLEM SELECTOR PLAN 3
Patient with a history of known HIV, reports he takes no medications at home for HIV.  - HIV labs in AM  - HIV consult in AM as patient may benefit from RDC follow up as an outpatient

## 2021-03-09 NOTE — ED PROVIDER NOTE - CLINICAL SUMMARY MEDICAL DECISION MAKING FREE TEXT BOX
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 Pt endorses trauma to the head. Seizures were consistent with prior seizures. Pt reports last EtOH consumption to be two weeks ago. He endorses diffuse chest pain and abdominal pain; both are diffusely tender to palpation. He endorses nausea and vomiting, Denies headache. Given Keppra for seizure prophylaxis. EKG shows no ST or T wave changes. Pt will be evaluated with head CT, abdominal CT, cardiac enzymes, CBC, CMP. Further management pending further workup. DAMI is a 64 yo man w/ PMH of HIV, alcohol abuse, substance abuse (crack-cocaine, marijuana), seizures, HTN, MI, CVA x2 with residual right sided weakness who presents after 2 seizures. Pt endorses trauma to the head. Seizures were consistent with prior seizures. Pt reports last EtOH consumption to be two weeks ago. He endorses diffuse chest pain and abdominal pain; both are diffusely tender to palpation. He endorses nausea and vomiting, Denies headache. Given Keppra for seizure prophylaxis. EKG shows no ST or T wave changes. Pt will be evaluated with head CT, abdominal CT, cardiac enzymes, CBC, CMP. Further management pending further workup. DAMI is a 64 yo man w/ PMH of HIV, alcohol abuse, substance abuse (crack-cocaine, marijuana), seizures, HTN, MI, CVA x2 with residual right sided weakness who presents after 2 seizures. Pt endorses trauma to the head. Seizures were consistent with prior seizures. Pt reports last EtOH consumption to be two weeks ago. He endorses diffuse chest pain and abdominal pain; both are diffusely tender to palpation. He endorses nausea and vomiting, Denies headache. Given Keppra for seizure prophylaxis. EKG shows no ST or T wave changes. Pt will be evaluated with head CT, abdominal CT, cardiac enzymes, CBC, CMP. Further management pending further workup.    UTox positive for cocaine and marijuana use. No seizures since arrival to ED. CT head and CT abdomen & pelvis negative for acute pathology. Case management and social work notified regarding pt's stiuation with attempting to be admitted to rehab at [facility], but having difficulties due to recurring seizures and ED visits in the setting of medication non-compliance. Epilepsy recommended EEG and will follow after admission. To be admitted to regional medical floor for workup and management of recurrent seizures, PT eval, and eventual transfer to rehab at [facility], which was coordinated originally by his , Shantal (), at his shelter. DAMI is a 64 yo man w/ PMH of HIV, alcohol abuse, substance abuse (crack-cocaine, marijuana), seizures, HTN, MI, CVA x2 with residual right sided weakness who presents after 2 seizures. Pt endorses trauma to the head. Seizures were consistent with prior seizures. Pt reports last EtOH consumption to be two weeks ago. He endorses diffuse chest pain and abdominal pain; both are diffusely tender to palpation. He endorses nausea and vomiting, Denies headache. Given Keppra for seizure prophylaxis. EKG shows no ST or T wave changes. Pt will be evaluated with head CT, abdominal CT, cardiac enzymes, CBC, CMP. Further management pending further workup.    UTox positive for cocaine and marijuana use. No seizures since arrival to ED. Pt loaded with 1g IV Keppra. CT head and CT abdomen & pelvis negative for acute pathology. Case management and social work notified regarding pt's situation with attempting to be admitted to rehab at [facility], but having difficulties due to recurring seizures and ED visits in the setting of medication non-compliance. Epilepsy recommended EEG and will follow after admission. To be admitted to regional medical floor for workup and management of recurrent seizures, PT eval, and transfer to rehab at [facility] if possible, which was coordinated originally by his , Shantal (), at his shelter.

## 2021-03-09 NOTE — H&P ADULT - PROBLEM SELECTOR PLAN 5
Patient reports he abuses crack cocaine and marijuana. As per patient he was planning to go to drug rehabilitation center today, however had seizure prior to presentation to clinic. UTox positive for cocaine metabolites and cannabinoid metabolites.  - Continue to monitor, NTD at this time Patient with a history of hypertension. Reports he takes 'a blood pressure pill' at home 'sometimes'. Does not check ambulatory BPs. Patient pressure on admission is hypertensive to 162/102.  - Start amlodipine 5 mg PO Qd for now

## 2021-03-09 NOTE — ED PROVIDER NOTE - OBJECTIVE STATEMENT
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 Pt states that he was preparing to go to rehab for his substance abuse today when he had a seizure. He states this was preceded by his usual aura of a burnt smell and spots in his vision. He denies biting his tongue, but endorses hitting his head. He endorses nausea and vomiting following the seizure. This was unwitnessed. He states that he was unable to help himself up off the ground and had a second seizure. He was then found by his  at his homeless shelter, who called 911 bringing him to the [facility] ED. The pt has an extensive seizure history, including a seizure yesterday that took him to the [facility] ED and on 3/5 that brought him to the [facility] ED. Pt endorses confusion, diffuse chest pain, and epigastric abdominal pain since the episode. He states that his last drink was 2 weeks ago. Pt denies fevers, chills, headache, shortness of breath, and constipation. He endorses recent watery diarrhea. 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 Pt states that he was preparing to go to rehab for his substance abuse today when he had a seizure. He states this was preceded by his usual aura of a burnt smell and spots in his vision. He denies biting his tongue, but endorses hitting his head. He endorses nausea and vomiting following the seizure. This was unwitnessed. He states that he was unable to help himself up off the ground and had a second seizure. He was then found by his  at his homeless shelter, who called 911 bringing him to the [facility] ED. The pt has an extensive seizure history, including a seizure yesterday that took him to the [facility] ED and on 3/5 that brought him to the [facility] ED. Pt endorses confusion, diffuse chest pain, and epigastric abdominal pain since the episode. He states that his last drink was 2 weeks ago. Pt denies fevers, chills, headache, shortness of breath, and constipation. He endorses recent watery diarrhea. Pt lives in homeless shelter ( [facility]: [phone]) and is poorly compliant with medications. DAMI is a 64 yo man w/ PMH of HIV, alcohol abuse, substance abuse (crack-cocaine, marijuana), seizures, HTN, MI, CVA x2 with residual right sided weakness who presents after a seizure. Pt states that he was preparing to go to rehab for his substance abuse today when he had a seizure. He states this was preceded by his usual aura of a burnt smell and spots in his vision. He denies biting his tongue, but endorses hitting his head. He endorses nausea and vomiting following the seizure. This was unwitnessed. He states that he was unable to help himself up off the ground and had a second seizure. He was then found by his  at his homeless shelter, who called 911 bringing him to the [facility] ED. The pt has an extensive seizure history, including a seizure yesterday that took him to the [facility] ED and on 3/5 that brought him to the [facility] ED. Pt endorses confusion, diffuse chest pain, and epigastric abdominal pain since the episode. He states that his last drink was 2 weeks ago. Pt denies fevers, chills, headache, shortness of breath, and constipation. He endorses recent watery diarrhea. Pt lives in homeless shelter ( [facility]: [phone]) and is poorly compliant with medications.

## 2021-03-10 DIAGNOSIS — R10.9 UNSPECIFIED ABDOMINAL PAIN: ICD-10-CM

## 2021-03-10 DIAGNOSIS — N17.9 ACUTE KIDNEY FAILURE, UNSPECIFIED: ICD-10-CM

## 2021-03-10 DIAGNOSIS — D61.818 OTHER PANCYTOPENIA: ICD-10-CM

## 2021-03-10 LAB
A1C WITH ESTIMATED AVERAGE GLUCOSE RESULT: 5.4 % — SIGNIFICANT CHANGE UP (ref 4–5.6)
ALBUMIN SERPL ELPH-MCNC: 2.7 G/DL — LOW (ref 3.3–5)
ALP SERPL-CCNC: 36 U/L — LOW (ref 40–120)
ALT FLD-CCNC: 8 U/L — LOW (ref 10–45)
ANION GAP SERPL CALC-SCNC: 7 MMOL/L — SIGNIFICANT CHANGE UP (ref 5–17)
ANISOCYTOSIS BLD QL: SLIGHT — SIGNIFICANT CHANGE UP
AST SERPL-CCNC: 17 U/L — SIGNIFICANT CHANGE UP (ref 10–40)
BASOPHILS # BLD AUTO: 0.01 K/UL — SIGNIFICANT CHANGE UP (ref 0–0.2)
BASOPHILS NFR BLD AUTO: 0.9 % — SIGNIFICANT CHANGE UP (ref 0–2)
BILIRUB SERPL-MCNC: 0.3 MG/DL — SIGNIFICANT CHANGE UP (ref 0.2–1.2)
BUN SERPL-MCNC: 19 MG/DL — SIGNIFICANT CHANGE UP (ref 7–23)
CALCIUM SERPL-MCNC: 8.6 MG/DL — SIGNIFICANT CHANGE UP (ref 8.4–10.5)
CHLORIDE SERPL-SCNC: 108 MMOL/L — SIGNIFICANT CHANGE UP (ref 96–108)
CO2 SERPL-SCNC: 20 MMOL/L — LOW (ref 22–31)
CREAT SERPL-MCNC: 1.48 MG/DL — HIGH (ref 0.5–1.3)
EOSINOPHIL # BLD AUTO: 0.01 K/UL — SIGNIFICANT CHANGE UP (ref 0–0.5)
EOSINOPHIL NFR BLD AUTO: 0.9 % — SIGNIFICANT CHANGE UP (ref 0–6)
ESTIMATED AVERAGE GLUCOSE: 108 MG/DL — SIGNIFICANT CHANGE UP (ref 68–114)
GIANT PLATELETS BLD QL SMEAR: PRESENT — SIGNIFICANT CHANGE UP
GLUCOSE BLDC GLUCOMTR-MCNC: 111 MG/DL — HIGH (ref 70–99)
GLUCOSE BLDC GLUCOMTR-MCNC: 78 MG/DL — SIGNIFICANT CHANGE UP (ref 70–99)
GLUCOSE BLDC GLUCOMTR-MCNC: 90 MG/DL — SIGNIFICANT CHANGE UP (ref 70–99)
GLUCOSE BLDC GLUCOMTR-MCNC: 90 MG/DL — SIGNIFICANT CHANGE UP (ref 70–99)
GLUCOSE SERPL-MCNC: 114 MG/DL — HIGH (ref 70–99)
HCT VFR BLD CALC: 28.7 % — LOW (ref 39–50)
HCV AB S/CO SERPL IA: 0.57 S/CO — SIGNIFICANT CHANGE UP
HCV AB SERPL-IMP: SIGNIFICANT CHANGE UP
HGB BLD-MCNC: 9.1 G/DL — LOW (ref 13–17)
LYMPHOCYTES # BLD AUTO: 0.35 K/UL — LOW (ref 1–3.3)
LYMPHOCYTES # BLD AUTO: 21.7 % — SIGNIFICANT CHANGE UP (ref 13–44)
MACROCYTES BLD QL: SLIGHT — SIGNIFICANT CHANGE UP
MAGNESIUM SERPL-MCNC: 1.6 MG/DL — SIGNIFICANT CHANGE UP (ref 1.6–2.6)
MANUAL SMEAR VERIFICATION: SIGNIFICANT CHANGE UP
MCHC RBC-ENTMCNC: 29.2 PG — SIGNIFICANT CHANGE UP (ref 27–34)
MCHC RBC-ENTMCNC: 31.7 GM/DL — LOW (ref 32–36)
MCV RBC AUTO: 92 FL — SIGNIFICANT CHANGE UP (ref 80–100)
MONOCYTES # BLD AUTO: 0.12 K/UL — SIGNIFICANT CHANGE UP (ref 0–0.9)
MONOCYTES NFR BLD AUTO: 7.6 % — SIGNIFICANT CHANGE UP (ref 2–14)
NEUTROPHILS # BLD AUTO: 1.12 K/UL — LOW (ref 1.8–7.4)
NEUTROPHILS NFR BLD AUTO: 68.9 % — SIGNIFICANT CHANGE UP (ref 43–77)
OVALOCYTES BLD QL SMEAR: SLIGHT — SIGNIFICANT CHANGE UP
PHOSPHATE SERPL-MCNC: 3.3 MG/DL — SIGNIFICANT CHANGE UP (ref 2.5–4.5)
PLAT MORPH BLD: ABNORMAL
PLATELET # BLD AUTO: 61 K/UL — LOW (ref 150–400)
POIKILOCYTOSIS BLD QL AUTO: SLIGHT — SIGNIFICANT CHANGE UP
POTASSIUM SERPL-MCNC: 4.3 MMOL/L — SIGNIFICANT CHANGE UP (ref 3.5–5.3)
POTASSIUM SERPL-SCNC: 4.3 MMOL/L — SIGNIFICANT CHANGE UP (ref 3.5–5.3)
PROT SERPL-MCNC: 7.9 G/DL — SIGNIFICANT CHANGE UP (ref 6–8.3)
RBC # BLD: 3.12 M/UL — LOW (ref 4.2–5.8)
RBC # FLD: 15.6 % — HIGH (ref 10.3–14.5)
RBC BLD AUTO: ABNORMAL
SCHISTOCYTES BLD QL AUTO: SLIGHT — SIGNIFICANT CHANGE UP
SMUDGE CELLS # BLD: PRESENT — SIGNIFICANT CHANGE UP
SODIUM SERPL-SCNC: 135 MMOL/L — SIGNIFICANT CHANGE UP (ref 135–145)
SPHEROCYTES BLD QL SMEAR: SLIGHT — SIGNIFICANT CHANGE UP
WBC # BLD: 1.62 K/UL — LOW (ref 3.8–10.5)
WBC # FLD AUTO: 1.62 K/UL — LOW (ref 3.8–10.5)

## 2021-03-10 PROCEDURE — 99223 1ST HOSP IP/OBS HIGH 75: CPT

## 2021-03-10 PROCEDURE — 99233 SBSQ HOSP IP/OBS HIGH 50: CPT | Mod: GC

## 2021-03-10 PROCEDURE — 95720 EEG PHY/QHP EA INCR W/VEEG: CPT

## 2021-03-10 PROCEDURE — 74019 RADEX ABDOMEN 2 VIEWS: CPT | Mod: 26

## 2021-03-10 RX ORDER — METRONIDAZOLE 500 MG
500 TABLET ORAL EVERY 8 HOURS
Refills: 0 | Status: DISCONTINUED | OUTPATIENT
Start: 2021-03-10 | End: 2021-03-15

## 2021-03-10 RX ORDER — DIVALPROEX SODIUM 500 MG/1
1000 TABLET, DELAYED RELEASE ORAL AT BEDTIME
Refills: 0 | Status: DISCONTINUED | OUTPATIENT
Start: 2021-03-10 | End: 2021-03-17

## 2021-03-10 RX ORDER — CEFTRIAXONE 500 MG/1
1000 INJECTION, POWDER, FOR SOLUTION INTRAMUSCULAR; INTRAVENOUS EVERY 24 HOURS
Refills: 0 | Status: COMPLETED | OUTPATIENT
Start: 2021-03-10 | End: 2021-03-16

## 2021-03-10 RX ORDER — BICTEGRAVIR SODIUM, EMTRICITABINE, AND TENOFOVIR ALAFENAMIDE FUMARATE 30; 120; 15 MG/1; MG/1; MG/1
1 TABLET ORAL DAILY
Refills: 0 | Status: DISCONTINUED | OUTPATIENT
Start: 2021-03-10 | End: 2021-03-17

## 2021-03-10 RX ORDER — ACETAMINOPHEN 500 MG
650 TABLET ORAL EVERY 6 HOURS
Refills: 0 | Status: DISCONTINUED | OUTPATIENT
Start: 2021-03-10 | End: 2021-03-11

## 2021-03-10 RX ORDER — LORATADINE 10 MG/1
1 TABLET ORAL
Qty: 0 | Refills: 0 | DISCHARGE

## 2021-03-10 RX ORDER — ATOVAQUONE 750 MG/5ML
1500 SUSPENSION ORAL DAILY
Refills: 0 | Status: DISCONTINUED | OUTPATIENT
Start: 2021-03-10 | End: 2021-03-17

## 2021-03-10 RX ADMIN — LEVETIRACETAM 500 MILLIGRAM(S): 250 TABLET, FILM COATED ORAL at 17:38

## 2021-03-10 RX ADMIN — BICTEGRAVIR SODIUM, EMTRICITABINE, AND TENOFOVIR ALAFENAMIDE FUMARATE 1 TABLET(S): 30; 120; 15 TABLET ORAL at 15:47

## 2021-03-10 RX ADMIN — DIVALPROEX SODIUM 1000 MILLIGRAM(S): 500 TABLET, DELAYED RELEASE ORAL at 22:44

## 2021-03-10 RX ADMIN — ATOVAQUONE 1500 MILLIGRAM(S): 750 SUSPENSION ORAL at 15:48

## 2021-03-10 RX ADMIN — CEFTRIAXONE 100 MILLIGRAM(S): 500 INJECTION, POWDER, FOR SOLUTION INTRAMUSCULAR; INTRAVENOUS at 11:34

## 2021-03-10 RX ADMIN — AMLODIPINE BESYLATE 5 MILLIGRAM(S): 2.5 TABLET ORAL at 05:38

## 2021-03-10 RX ADMIN — PANTOPRAZOLE SODIUM 40 MILLIGRAM(S): 20 TABLET, DELAYED RELEASE ORAL at 06:11

## 2021-03-10 RX ADMIN — Medication 100 MILLIGRAM(S): at 13:39

## 2021-03-10 RX ADMIN — LEVETIRACETAM 500 MILLIGRAM(S): 250 TABLET, FILM COATED ORAL at 05:38

## 2021-03-10 RX ADMIN — Medication 100 MILLIGRAM(S): at 22:44

## 2021-03-10 NOTE — PROGRESS NOTE ADULT - PROBLEM SELECTOR PLAN 2
Patient with a history of DM type 2, reports he takes no medications for this at home.  - A1c with AM labs  - mISS for now, no need to start nutritional or basal insulin yet Patient with a longstanding history of seizures, is supposed to take Keppra and reports he takes 'two two times a day', however reports he has not been taking his medications for the past 2 weeks. Given 1000 mg Keppra in the ED.   - vEEG ordered  - Keppra level as add on lab  - Will start keppra 500 mg PO BID  - ativan 2 mg IV PRN for seizure activity Patient with a longstanding history of seizures, is supposed to take Keppra and reports he takes 'two two times a day', however reports he has not been taking his medications for the past 2 weeks. Given 1000 mg Keppra in the ED.   - vEEG ordered  - Keppra level as add on lab  - Will start keppra 500 mg PO BID- f/u epilepsy recs   - ativan 2 mg IV PRN for seizure activity

## 2021-03-10 NOTE — CONSULT NOTE ADULT - SUBJECTIVE AND OBJECTIVE BOX
EPILEPSY PROGRESS NOTE:  HPI:  65M PMH HIV, EtOH (last drink 2 weeks ago), substance abuse (crack and cannabis), HTN, MI, CVA x 2 with residual right sided weakness, epilepsy on Keppra presents from shelter. Patient reports he was supposed to go to drug rehabilitation for substance abuse today, however he reports he had a seizure today. Reports incontinence, loss of consciousness reports he does not know how long symptoms went on for. Reports he did not fall or hit his head during seizure event. Patient reports he has been intermittently compliant with his medications and has taken his Keppra intermittently over the past 2 weeks. Reports headache and abdominal pain. ROS otherwise negative.     ED course - VS /91, , RR 18, T 98.4, SpO2 100% ORA. CBC with Hgb 9.6/Hct 31.7, Plt 66. Coags normal. BMP notable with Cl 109, Cr 1.51 (similar to previous hospitalizations). UTox performed and positive for cannabis and cocaine. COVID pcr negative. Blood alcohol negative. CXR without pathology. CT head without pathology. CT abdomen performed and with sigmoid diverticulosis/possible diverticulitis however no WBC count or fever. Patient given 1000 mg keppra IV and 500 mL NS and admitted to medicine for further evaluation and treatment.    Epilepsy consulted for report of 2 unwitnessed seizures as per patient. Patient states that he had febrile seizures as a baby, and since then have had seizures. His typical event has been described to him as whole body shaking that is usually preceded with a burning smell. He state that he gets a weird abdominal feeling following his events, and is unsure of its duration. The frequency of his events are at least once per week, and he has been on Keppra for a long period of time. He has tried Dilantin in the past. He report tongue bite and urinary incontinence in the past with his events. He report the most recent event was yesterday, and he recall the burning smell.     Epilepsy risk factors:  Head injury with subsequent LOC?: Denies  Febrile seizures in infancy?: Yes  Hx of CNS infection?: Denies  Family hx of epilepsy?: Denies  Known CNS pathology?: CVA *2     Prior AEDs:  Dilantin    Review of Systems:  CONSTITUTIONAL:  No weight loss, fever, chills, weakness or fatigue.  HEENT:  Eyes:  No visual loss, blurred vision, double vision or yellow sclerae. Ears, Nose, Throat:  No hearing loss, sneezing, congestion, runny nose or sore throat.  SKIN:  No rash or itching.  CARDIOVASCULAR:  No chest pain, chest pressure or chest discomfort. No palpitations or edema.  RESPIRATORY:  No shortness of breath, cough or sputum.  GASTROINTESTINAL:  No anorexia, nausea, vomiting or diarrhea. No abdominal pain or blood.  GENITOURINARY: No Burning on urination.   NEUROLOGICAL:  see HPI  MUSCULOSKELETAL:  No muscle, back pain, joint pain or stiffness.  HEMATOLOGIC:  No anemia, bleeding or bruising.  LYMPHATICS:  No enlarged nodes. No history of splenectomy.  PSYCHIATRIC:  No history of depression or anxiety.  ENDOCRINOLOGIC:  No reports of sweating, cold or heat intolerance. No polyuria or polydipsia.  ALLERGIES:  No history of asthma, hives, eczema or rhinitis.     PAST MEDICAL & SURGICAL HISTORY:  Diabetes type 2, uncontrolled  Bipolar disorder  Cocaine use  HIV (human immunodeficiency virus infection)  Essential hypertension     Allergies:  No Known Allergies     MEDICATIONS  (STANDING):  amLODIPine   Tablet 5 milliGRAM(s) Oral daily  atovaquone  Suspension 1500 milliGRAM(s) Oral daily  bictegravir 50 mG/emtricitabine 200 mG/tenofovir alafenamide 25 mG (BIKTARVY) 1 Tablet(s) Oral daily  cefTRIAXone   IVPB 1000 milliGRAM(s) IV Intermittent every 24 hours  dextrose 40% Gel 15 Gram(s) Oral once  dextrose 5%. 1000 milliLiter(s) (50 mL/Hr) IV Continuous <Continuous>  dextrose 5%. 1000 milliLiter(s) (100 mL/Hr) IV Continuous <Continuous>  dextrose 50% Injectable 25 Gram(s) IV Push once  dextrose 50% Injectable 12.5 Gram(s) IV Push once  dextrose 50% Injectable 25 Gram(s) IV Push once  glucagon  Injectable 1 milliGRAM(s) IntraMuscular once  insulin lispro (ADMELOG) corrective regimen sliding scale   SubCutaneous Before meals and at bedtime  levETIRAcetam 500 milliGRAM(s) Oral two times a day  metroNIDAZOLE  IVPB 500 milliGRAM(s) IV Intermittent every 8 hours  pantoprazole    Tablet 40 milliGRAM(s) Oral before breakfast    MEDICATIONS  (PRN):  acetaminophen   Tablet .. 650 milliGRAM(s) Oral every 6 hours PRN Moderate Pain (4 - 6)  LORazepam   Injectable 2 milliGRAM(s) IV Push once PRN Seizure activity    VITAL SIGNS:  T(C): 37.1 (03-10-21 @ 12:24), Max: 37.1 (03-10-21 @ 06:16)  HR: 78 (03-10-21 @ 12:24) (72 - 87)  BP: 142/78 (03-10-21 @ 12:24) (134/75 - 162/102)  RR: 18 (03-10-21 @ 12:24) (18 - 19)  SpO2: 99% (03-10-21 @ 12:24) (98% - 100%)  Wt(kg): --    PHYSICAL EXAM:  Constitutional: Disheveled appearing man in bed.  Psychiatric: calm  Ears, Nose, Throat: No teeth, mucus membranes moist  Neck: supple, no lymphadenopathy or nodules palpable  Cardiovascular: regular rate and rhythm, normal S1/S2, no murmurs   Chest: Clear to bases. 	  Abdomen: soft, non-tender, no hepatosplenomegaly   Extremities: no edema, clubbing or cyanosis  Skin: no rash or neurocutaneous signs     Cognitive:  Alert and oriented to person, place and date. Speech fluent without aphasia or dysarthria. Follows commands appropriately.     Cranial Nerves:  II: Full to confrontation. III/IV/VI: PERRLA 3mm brisk EOMF No nystagmus  V1V2V3: Symmetric, VII: Face appears symmetric VIII: Normal to screening, IX/X: Palate Elevates Symmetrical  XI: Trapezius Symmetric  XII: Tongue midline  Motor:  Power: RUE 4/5 RLE 2/5 and LUE and LLE 5/5   Sensation:  Intact to light touch.   Coordination/Gait:  Finger-nose-finger some difficulty w/ LUE, Gait deferred   Reflexes:  DTR: 1+ symmetric all 4 limbs, no clonus    LABS:  CBC Full  -  ( 10 Mar 2021 14:40 )  WBC Count : 1.62 K/uL  RBC Count : 3.12 M/uL  Hemoglobin : 9.1 g/dL  Hematocrit : 28.7 %  Platelet Count - Automated : 61 K/uL  Mean Cell Volume : 92.0 fl  Mean Cell Hemoglobin : 29.2 pg  Mean Cell Hemoglobin Concentration : 31.7 gm/dL  Auto Neutrophil # : x  Auto Lymphocyte # : x  Auto Monocyte # : x  Auto Eosinophil # : x  Auto Basophil # : x  Auto Neutrophil % : x  Auto Lymphocyte % : x  Auto Monocyte % : x  Auto Eosinophil % : x  Auto Basophil % : x    03-09    138  |  109<H>  |  20  ----------------------------<  90  4.7   |  21<L>  |  1.51<H>    Ca    8.6      09 Mar 2021 14:57    TPro  9.2<H>  /  Alb  3.1<L>  /  TBili  0.4  /  DBili  x   /  AST  23  /  ALT  12  /  AlkPhos  36<L>  03-09    LIVER FUNCTIONS - ( 09 Mar 2021 14:57 )  Alb: 3.1 g/dL / Pro: 9.2 g/dL / ALK PHOS: 36 U/L / ALT: 12 U/L / AST: 23 U/L / GGT: x           PT/INR - ( 09 Mar 2021 14:57 )   PT: 11.7 sec;   INR: 0.97        EPILEPSY PROGRESS NOTE:  HPI:  65M PMH HIV, EtOH (last drink 2 weeks ago), substance abuse (crack and cannabis), HTN, MI, CVA x 2 with residual right sided weakness, epilepsy on Keppra presents from shelter. Patient reports he was supposed to go to drug rehabilitation for substance abuse today, however he reports he had a seizure today. Reports incontinence, loss of consciousness reports he does not know how long symptoms went on for. Reports he did not fall or hit his head during seizure event. Patient reports he has been intermittently compliant with his medications and has taken his Keppra intermittently over the past 2 weeks. Reports headache and abdominal pain. ROS otherwise negative.     ED course - VS /91, , RR 18, T 98.4, SpO2 100% ORA. CBC with Hgb 9.6/Hct 31.7, Plt 66. Coags normal. BMP notable with Cl 109, Cr 1.51 (similar to previous hospitalizations). UTox performed and positive for cannabis and cocaine. COVID pcr negative. Blood alcohol negative. CXR without pathology. CT head without pathology. CT abdomen performed and with sigmoid diverticulosis/possible diverticulitis however no WBC count or fever. Patient given 1000 mg keppra IV and 500 mL NS and admitted to medicine for further evaluation and treatment.    Epilepsy consulted for report of 2 unwitnessed seizures as per patient. Patient states that he had febrile seizures as a baby, and since then have had seizures. His typical event has been described to him as whole body shaking that is usually preceded with a burning smell. He states that he experiences a weird abdominal feeling following his events, and is unsure of its duration. The frequency of his events are at least once per week, and he has been on Keppra for a long period of time. He has tried Dilantin in the past but believes there are no other medications used.  He reports tongue bite and urinary incontinence in the past with his events. He reports the most recent event was yesterday, and recalls the burning smell but nothing else, awakening after some time.      Epilepsy risk factors:  Head injury with subsequent LOC?: Denies  Febrile seizures in infancy?: Yes  Hx of CNS infection?: Denies  Family hx of epilepsy?: Denies  Known CNS pathology?: CVA *2     Prior AEDs:  Dilantin    Review of Systems:  CONSTITUTIONAL:  No weight loss, fever, chills, weakness or fatigue.  HEENT:  Eyes:  No visual loss, blurred vision, double vision or yellow sclerae. Ears, Nose, Throat:  No hearing loss, sneezing, congestion, runny nose or sore throat.  SKIN:  No rash or itching.  CARDIOVASCULAR:  No chest pain, chest pressure or chest discomfort. No palpitations or edema.  RESPIRATORY:  No shortness of breath, cough or sputum.  GASTROINTESTINAL:  No anorexia, nausea, vomiting or diarrhea. No abdominal pain or blood.  GENITOURINARY: No Burning on urination.   NEUROLOGICAL:  see HPI  MUSCULOSKELETAL:  No muscle, back pain, joint pain or stiffness.  HEMATOLOGIC:  No anemia, bleeding or bruising.  LYMPHATICS:  No enlarged nodes. No history of splenectomy.  PSYCHIATRIC:  No history of depression or anxiety.  ENDOCRINOLOGIC:  No reports of sweating, cold or heat intolerance. No polyuria or polydipsia.  ALLERGIES:  No history of asthma, hives, eczema or rhinitis.     PAST MEDICAL & SURGICAL HISTORY:  Diabetes type 2, uncontrolled  Bipolar disorder  Cocaine use  HIV (human immunodeficiency virus infection)  Essential hypertension     Allergies:  No Known Allergies     MEDICATIONS  (STANDING):  amLODIPine   Tablet 5 milliGRAM(s) Oral daily  atovaquone  Suspension 1500 milliGRAM(s) Oral daily  bictegravir 50 mG/emtricitabine 200 mG/tenofovir alafenamide 25 mG (BIKTARVY) 1 Tablet(s) Oral daily  cefTRIAXone   IVPB 1000 milliGRAM(s) IV Intermittent every 24 hours  dextrose 40% Gel 15 Gram(s) Oral once  dextrose 5%. 1000 milliLiter(s) (50 mL/Hr) IV Continuous <Continuous>  dextrose 5%. 1000 milliLiter(s) (100 mL/Hr) IV Continuous <Continuous>  dextrose 50% Injectable 25 Gram(s) IV Push once  dextrose 50% Injectable 12.5 Gram(s) IV Push once  dextrose 50% Injectable 25 Gram(s) IV Push once  glucagon  Injectable 1 milliGRAM(s) IntraMuscular once  insulin lispro (ADMELOG) corrective regimen sliding scale   SubCutaneous Before meals and at bedtime  levETIRAcetam 500 milliGRAM(s) Oral two times a day  metroNIDAZOLE  IVPB 500 milliGRAM(s) IV Intermittent every 8 hours  pantoprazole    Tablet 40 milliGRAM(s) Oral before breakfast    MEDICATIONS  (PRN):  acetaminophen   Tablet .. 650 milliGRAM(s) Oral every 6 hours PRN Moderate Pain (4 - 6)  LORazepam   Injectable 2 milliGRAM(s) IV Push once PRN Seizure activity    VITAL SIGNS:  T(C): 37.1 (03-10-21 @ 12:24), Max: 37.1 (03-10-21 @ 06:16)  HR: 78 (03-10-21 @ 12:24) (72 - 87)  BP: 142/78 (03-10-21 @ 12:24) (134/75 - 162/102)  RR: 18 (03-10-21 @ 12:24) (18 - 19)  SpO2: 99% (03-10-21 @ 12:24) (98% - 100%)  Wt(kg): --    PHYSICAL EXAM:  Constitutional: Disheveled appearing man in bed.  Psychiatric: calm  Ears, Nose, Throat: No teeth, mucus membranes moist  Neck: supple, no lymphadenopathy or nodules palpable  Cardiovascular: regular rate and rhythm, normal S1/S2, no murmurs   Chest: Clear to bases. 	  Abdomen: soft, non-tender, no hepatosplenomegaly   Extremities: no edema, clubbing or cyanosis  Skin: no rash or neurocutaneous signs     Cognitive:  Alert and oriented to person, place and date. Speech fluent without aphasia or dysarthria. Follows commands appropriately.     Cranial Nerves:  II: Full to confrontation. III/IV/VI: PERRLA 3mm brisk EOMF No nystagmus  V1V2V3: Symmetric, VII: Face appears symmetric VIII: Normal to screening, IX/X: Palate Elevates Symmetrical  XI: Trapezius Symmetric  XII: Tongue midline  Motor:  Power: RUE 4/5 RLE 2/5 and LUE and LLE 5/5   Sensation:  Intact to light touch.   Coordination/Gait:  Finger-nose-finger some difficulty w/ LUE, Gait deferred   Reflexes:  DTR: 1+ symmetric all 4 limbs, no clonus    LABS:  CBC Full  -  ( 10 Mar 2021 14:40 )  WBC Count : 1.62 K/uL  RBC Count : 3.12 M/uL  Hemoglobin : 9.1 g/dL  Hematocrit : 28.7 %  Platelet Count - Automated : 61 K/uL  Mean Cell Volume : 92.0 fl  Mean Cell Hemoglobin : 29.2 pg  Mean Cell Hemoglobin Concentration : 31.7 gm/dL  Auto Neutrophil # : x  Auto Lymphocyte # : x  Auto Monocyte # : x  Auto Eosinophil # : x  Auto Basophil # : x  Auto Neutrophil % : x  Auto Lymphocyte % : x  Auto Monocyte % : x  Auto Eosinophil % : x  Auto Basophil % : x    03-09    138  |  109<H>  |  20  ----------------------------<  90  4.7   |  21<L>  |  1.51<H>    Ca    8.6      09 Mar 2021 14:57    TPro  9.2<H>  /  Alb  3.1<L>  /  TBili  0.4  /  DBili  x   /  AST  23  /  ALT  12  /  AlkPhos  36<L>  03-09    LIVER FUNCTIONS - ( 09 Mar 2021 14:57 )  Alb: 3.1 g/dL / Pro: 9.2 g/dL / ALK PHOS: 36 U/L / ALT: 12 U/L / AST: 23 U/L / GGT: x           PT/INR - ( 09 Mar 2021 14:57 )   PT: 11.7 sec;   INR: 0.97

## 2021-03-10 NOTE — PROGRESS NOTE ADULT - PROBLEM SELECTOR PLAN 8
F - none  E - replete PRN  N - diabetic dash/tlc  A - RMF DVT ppx - Lovenox  GI ppx - Pantoprazole 40 mg PO Qd Patient reports he abuses crack cocaine and marijuana. As per patient he was planning to go to drug rehabilitation center today, however had seizure prior to presentation to clinic. UTox positive for cocaine metabolites and cannabinoid metabolites.  - Continue to monitor, NTD at this time

## 2021-03-10 NOTE — PROGRESS NOTE ADULT - PROBLEM SELECTOR PROBLEM 4
Thrombocythemia HIV (human immunodeficiency virus infection) VINAY (acute kidney injury) Pancytopenia

## 2021-03-10 NOTE — PROGRESS NOTE ADULT - ASSESSMENT
65M lives in shelter PMH HIV (unclear if patient takes medications), EtOH abuse last about 2 weeks ago), substance abuse of cocaine and cannabis), HTN, MI in past, CVA x2 (residual right sided weakness), epilepsy presents after reported seizure. Received Keppra in ED and admitted to medicine for further evaluation and treatment. 65M lives in shelter PMH HIV (unclear if patient takes medications), EtOH abuse last about 2 weeks ago), substance abuse of cocaine and cannabis), HTN, MI in past, CVA x2 (residual right sided weakness), epilepsy presents with abdominal pain after reported seizure. Received Keppra in ED and admitted to medicine for further evaluation and treatment.

## 2021-03-10 NOTE — PROGRESS NOTE ADULT - PROBLEM SELECTOR PLAN 4
Patient with thrombopenia. Possible causes of thrombocytopenia include EtOH abuse vs HIV infection.   - F/u AM CBC and HIV labs Patient with a history of known HIV, reports he takes no medications at home for HIV.  - HIV labs in AM  - HIV consult in AM as patient may benefit from RDC follow up as an outpatient Patient with a history of known HIV, reports he takes no medications at home for HIV.  - f/u T cell count, viral load count   - HIV consulted-. Started on biktarvy and atovaquone as per Dr. Ayala, HIV doc. Attempted to call PCP. unclear if VINAY vs CKD (pt w/ hx. of HTN, and nonadherence to medications)  -Scr of 1.5 on admission  -trend CMP  -avoid nephrotoxic medications Patient with thrombopenia, leukopenia, anemia- pancytopenic. Possible causes of thrombocytopenia include EtOH abuse vs HIV infection.   - Trend CBC  -no signs of bleeding carol

## 2021-03-10 NOTE — PROGRESS NOTE ADULT - PROBLEM SELECTOR PLAN 5
Patient with a history of hypertension. Reports he takes 'a blood pressure pill' at home 'sometimes'. Does not check ambulatory BPs. Patient pressure on admission is hypertensive to 162/102.  - Start amlodipine 5 mg PO Qd for now Patient with thrombopenia. Possible causes of thrombocytopenia include EtOH abuse vs HIV infection.   - F/u AM CBC and HIV labs Patient with thrombopenia. Possible causes of thrombocytopenia include EtOH abuse vs HIV infection.   - Trend CBC Patient with thrombopenia, leukopenia, anemia- pancytopenic. Possible causes of thrombocytopenia include EtOH abuse vs HIV infection.   - Trend CBC  -no signs of bleeding carol unclear if VINAY vs CKD (pt w/ hx. of HTN, and nonadherence to medications)  -Scr of 1.5 on admission  -trend CMP  -avoid nephrotoxic medications

## 2021-03-10 NOTE — PROGRESS NOTE ADULT - PROBLEM SELECTOR PLAN 6
Patient reports he abuses crack cocaine and marijuana. As per patient he was planning to go to drug rehabilitation center today, however had seizure prior to presentation to clinic. UTox positive for cocaine metabolites and cannabinoid metabolites.  - Continue to monitor, NTD at this time Patient with a history of hypertension. Reports he takes 'a blood pressure pill' at home 'sometimes'. Does not check ambulatory BPs. Patient pressure on admission is hypertensive to 162/102.  - Start amlodipine 5 mg PO Qd for now Patient with a history of hypertension. Reports he takes 'a blood pressure pill' at home 'sometimes'. Does not check ambulatory BPs. Patient pressure on admission is hypertensive to 162/102.  - Start amlodipine 5 mg PO Qd for now  - Patient with a history of DM type 2, reports he takes no medications for this at home.  - HgA1c 5.4- well controlled   - mISS for now, no need to start nutritional or basal insulin yet

## 2021-03-10 NOTE — PROGRESS NOTE ADULT - PROBLEM SELECTOR PLAN 10
F - none  E - replete PRN  N - diabetic dash/tlc  A - RMF F - none  E - replete PRN  N - diabetic dash/tlc  dvt ppx- holding in setting of thrombocytopenia   A - RMF

## 2021-03-10 NOTE — PROGRESS NOTE ADULT - PROBLEM SELECTOR PLAN 3
Patient with a history of known HIV, reports he takes no medications at home for HIV.  - HIV labs in AM  - HIV consult in AM as patient may benefit from RDC follow up as an outpatient Patient with a history of DM type 2, reports he takes no medications for this at home.  - A1c with AM labs  - mISS for now, no need to start nutritional or basal insulin yet Patient with a history of DM type 2, reports he takes no medications for this at home.  - HgA1c 5.4- well controlled   - mISS for now, no need to start nutritional or basal insulin yet Patient with a history of known HIV, reports he takes no medications at home for HIV.  - f/u T cell count, viral load count   - HIV consulted-. Started on biktarvy and atovaquone (takes bactrim outpt. but has VINAY vs CKD of Scr 1.5) as per Dr. Ayala, HIV doc. Attempted to call PCP.

## 2021-03-10 NOTE — PROGRESS NOTE ADULT - SUBJECTIVE AND OBJECTIVE BOX
INTERVAL HPI/OVERNIGHT EVENTS:    SUBJECTIVE: Patient seen and examined at bedside.    OBJECTIVE:    VITAL SIGNS:  ICU Vital Signs Last 24 Hrs  T(C): 37.1 (10 Mar 2021 06:16), Max: 37.1 (10 Mar 2021 06:16)  T(F): 98.7 (10 Mar 2021 06:16), Max: 98.7 (10 Mar 2021 06:16)  HR: 87 (10 Mar 2021 06:16) (72 - 100)  BP: 134/75 (10 Mar 2021 06:16) (134/75 - 162/102)  BP(mean): --  ABP: --  ABP(mean): --  RR: 19 (10 Mar 2021 06:16) (18 - 19)  SpO2: 98% (10 Mar 2021 06:16) (98% - 100%)        CAPILLARY BLOOD GLUCOSE      POCT Blood Glucose.: 85 mg/dL (09 Mar 2021 22:08)      PHYSICAL EXAM:          MEDICATIONS:  MEDICATIONS  (STANDING):  amLODIPine   Tablet 5 milliGRAM(s) Oral daily  dextrose 40% Gel 15 Gram(s) Oral once  dextrose 5%. 1000 milliLiter(s) (50 mL/Hr) IV Continuous <Continuous>  dextrose 5%. 1000 milliLiter(s) (100 mL/Hr) IV Continuous <Continuous>  dextrose 50% Injectable 25 Gram(s) IV Push once  dextrose 50% Injectable 12.5 Gram(s) IV Push once  dextrose 50% Injectable 25 Gram(s) IV Push once  enoxaparin Injectable 40 milliGRAM(s) SubCutaneous every 24 hours  glucagon  Injectable 1 milliGRAM(s) IntraMuscular once  insulin lispro (ADMELOG) corrective regimen sliding scale   SubCutaneous Before meals and at bedtime  levETIRAcetam 500 milliGRAM(s) Oral two times a day  pantoprazole    Tablet 40 milliGRAM(s) Oral before breakfast    MEDICATIONS  (PRN):  LORazepam   Injectable 2 milliGRAM(s) IV Push once PRN Seizure activity      ALLERGIES:  Allergies    No Known Allergies    Intolerances        LABS:                        9.6    2.18  )-----------( 66       ( 09 Mar 2021 14:57 )             31.7     03-09    138  |  109<H>  |  20  ----------------------------<  90  4.7   |  21<L>  |  1.51<H>    Ca    8.6      09 Mar 2021 14:57    TPro  9.2<H>  /  Alb  3.1<L>  /  TBili  0.4  /  DBili  x   /  AST  23  /  ALT  12  /  AlkPhos  36<L>  03    PT/INR - ( 09 Mar 2021 14:57 )   PT: 11.7 sec;   INR: 0.97            Urinalysis Basic - ( 09 Mar 2021 15:54 )    Color: Yellow / Appearance: Clear / S.025 / pH: x  Gluc: x / Ketone: NEGATIVE  / Bili: Negative / Urobili: 0.2 E.U./dL   Blood: x / Protein: 30 mg/dL / Nitrite: NEGATIVE   Leuk Esterase: NEGATIVE / RBC: < 5 /HPF / WBC < 5 /HPF   Sq Epi: x / Non Sq Epi: x / Bacteria: Few /HPF        RADIOLOGY & ADDITIONAL TESTS: Reviewed. INTERVAL HPI/OVERNIGHT EVENTS:  Pt. admitted overnight.     SUBJECTIVE: Patient seen and examined at bedside. A&Ox3. Reporting abdominal pain 9/10 throughout, [place] in RLQ. Chest pain that is reproducible on palpation- non-radiating. Notes that both are chronic. Notes 2 episodes of black stools yesterday. Reporting rectal exam in ED showed brown stool. He denies nausea, vomiting, urinary changes.     OBJECTIVE:    VITAL SIGNS:  ICU Vital Signs Last 24 Hrs  T(C): 37.1 (10 Mar 2021 06:16), Max: 37.1 (10 Mar 2021 06:16)  T(F): 98.7 (10 Mar 2021 06:16), Max: 98.7 (10 Mar 2021 06:16)  HR: 87 (10 Mar 2021 06:16) (72 - 100)  BP: 134/75 (10 Mar 2021 06:16) (134/75 - 162/102)  BP(mean): --  ABP: --  ABP(mean): --  RR: 19 (10 Mar 2021 06:16) (18 - 19)  SpO2: 98% (10 Mar 2021 06:16) (98% - 100%)        CAPILLARY BLOOD GLUCOSE      POCT Blood Glucose.: 85 mg/dL (09 Mar 2021 22:08)      PHYSICAL EXAM:  Constitutional: A&OX3  Head: NC/AT  Eyes: PERRL, EOMI, anicteric sclera  ENT: MMM  Neck: supple; no JVD or thyromegaly  Respiratory: CTA B/L; no W/R/R,   Cardiac: +S1/S2; RRR; no M/R/G;   Gastrointestinal: abdomen tender to palpation throughout, especially in the RLQ, ND; no rebound or guarding  Extremities: WWP, no clubbing or cyanosis; no peripheral edema  Musculoskeletal: NROM x4, resting tremor in bilateral hands   Neurologic: AAOx3; CNII-XII grossly intact; no focal deficits  Psychiatric: irritated        MEDICATIONS:  MEDICATIONS  (STANDING):  amLODIPine   Tablet 5 milliGRAM(s) Oral daily  dextrose 40% Gel 15 Gram(s) Oral once  dextrose 5%. 1000 milliLiter(s) (50 mL/Hr) IV Continuous <Continuous>  dextrose 5%. 1000 milliLiter(s) (100 mL/Hr) IV Continuous <Continuous>  dextrose 50% Injectable 25 Gram(s) IV Push once  dextrose 50% Injectable 12.5 Gram(s) IV Push once  dextrose 50% Injectable 25 Gram(s) IV Push once  enoxaparin Injectable 40 milliGRAM(s) SubCutaneous every 24 hours  glucagon  Injectable 1 milliGRAM(s) IntraMuscular once  insulin lispro (ADMELOG) corrective regimen sliding scale   SubCutaneous Before meals and at bedtime  levETIRAcetam 500 milliGRAM(s) Oral two times a day  pantoprazole    Tablet 40 milliGRAM(s) Oral before breakfast    MEDICATIONS  (PRN):  LORazepam   Injectable 2 milliGRAM(s) IV Push once PRN Seizure activity      ALLERGIES:  Allergies    No Known Allergies    Intolerances        LABS:                        9.6    2.18  )-----------( 66       ( 09 Mar 2021 14:57 )             31.7         138  |  109<H>  |  20  ----------------------------<  90  4.7   |  21<L>  |  1.51<H>    Ca    8.6      09 Mar 2021 14:57    TPro  9.2<H>  /  Alb  3.1<L>  /  TBili  0.4  /  DBili  x   /  AST  23  /  ALT  12  /  AlkPhos  36<L>  09    PT/INR - ( 09 Mar 2021 14:57 )   PT: 11.7 sec;   INR: 0.97            Urinalysis Basic - ( 09 Mar 2021 15:54 )    Color: Yellow / Appearance: Clear / S.025 / pH: x  Gluc: x / Ketone: NEGATIVE  / Bili: Negative / Urobili: 0.2 E.U./dL   Blood: x / Protein: 30 mg/dL / Nitrite: NEGATIVE   Leuk Esterase: NEGATIVE / RBC: < 5 /HPF / WBC < 5 /HPF   Sq Epi: x / Non Sq Epi: x / Bacteria: Few /HPF        RADIOLOGY & ADDITIONAL TESTS: Reviewed.

## 2021-03-10 NOTE — CONSULT NOTE ADULT - ASSESSMENT
65M PMH HIV, EtOH (last drink 2 weeks ago), substance abuse (crack and cannabis), HTN, MI, CVA x 2 with residual right sided weakness, epilepsy on Keppra who was admitted on 3/9 for 2 unwitnessed seizure events. EEG thus far has been normal, however, history obtained from patient indicate uncontrolled seizure events, and hereby medication adjustment is warranted.    Recommendations:  Increase Keppra 500mg Q12hrs to   Continue vEEG monitoring  Maintain seizure and fall precautions 65M PMH HIV, EtOH (last drink 2 weeks ago), substance abuse (crack and cannabis), HTN, MI, CVA x 2 with residual right sided weakness, epilepsy on Keppra who was admitted on 3/9 for 2 unwitnessed seizure events. EEG thus far has been normal, however, history obtained from patient indicate uncontrolled seizure events, and hereby medication adjustment is warranted.    Recommendations:  Please D/C Keppra 500mg Q12hrs  Please initiate Depakote 1000mg ER QHS starting tonight   Continue vEEG monitoring  Maintain seizure and fall precautions 65M PMH HIV, EtOH (last drink 2 weeks ago), substance abuse (crack and cannabis), HTN, MI, CVA x 2 with residual right sided weakness, epilepsy on Keppra who was admitted on 3/9 for 2 unwitnessed seizure events. EEG thus far has been normal, however, history obtained from patient indicate uncontrolled seizure events, and hereby medication adjustment is warranted.    Recommendations:  Please D/C Keppra 500mg Q12hrs  Please initiate Depakote 1000mg ER QHS starting tonight   Please obtain valproic acid level in AM  Continue vEEG monitoring  Maintain seizure and fall precautions 65M PMH HIV, EtOH (last drink 2 weeks ago), substance abuse (crack and cannabis), HTN, MI, CVA x 2 with residual right sided weakness, epilepsy on Keppra who was admitted on 3/9 for 2 unwitnessed seizure events. EEG thus far has been normal, however, history obtained from patient indicate uncontrolled seizure events, and hereby medication adjustment is warranted.  He prefers to use a medication that is once daily in dosing to improve compliance.  A mood stabilizer/epilepsy medication may be the best choice for him, so recommending depakotevalproate, which has a once daily ER formulation (not the DR version)    Recommendations:  Please D/C Keppra 500mg Q12hrs  Please initiate Depakote 1000mg ER QHS starting tonight   Please obtain valproic acid level in AM  Continue vEEG monitoring  Maintain seizure and fall precautions

## 2021-03-10 NOTE — PROGRESS NOTE ADULT - PROBLEM SELECTOR PLAN 1
Patient with a longstanding history of seizures, is supposed to take Keppra and reports he takes 'two two times a day', however reports he has not been taking his medications for the past 2 weeks. Given 1000 mg Keppra in the ED.   - vEEG ordered  - Keppra level as add on lab  - Will start keppra 500 mg PO BID  - ativan 2 mg IV PRN for seizure activity CT-   Abd XR- No abnormal bowel dilatation or pneumoperitoneum. Lumbar spine degenerative changes, dextroscoliosis. Abdominal and pelvic vascular calcification.. Heart size within normal limits, thoracic aortic calcification. Lungs and mediastinum are unremarkable. Stable bony structures. Elevation of the left hemidiaphragm. -Pt. reporting chronic abdominal pain - 8-9/10 in severity, abdomen tender to palpation throughout, especially in the RLQ, ND; no rebound or guarding  -CT A/P- Sigmoid diverticulosis with mild mural thickening, differential includes chronic mural hypertrophy or mild colitis/diverticulitis in the appropriate clinical setting. Severe aortobiiliac calcific atherosclerosis. Focal fusiform aortic aneurysm above celiac axis 3.6 cm.  -Abd XR- No abnormal bowel dilatation or pneumoperitoneum. Lumbar spine degenerative changes, dextroscoliosis. Abdominal and pelvic vascular calcification.. Heart size within normal limits, thoracic aortic calcification. Lungs and mediastinum are unremarkable. Stable bony structures. Elevation of the left hemidiaphragm.  -Concern for possible diverticulitis- started on ceftriaxone 1 g IV qd, flagyl 500 mg IV q8h -Pt. reporting chronic abdominal pain - 8-9/10 in severity, abdomen tender to palpation throughout, especially in the RLQ, ND; no rebound or guarding  -CT A/P- Sigmoid diverticulosis with mild mural thickening, differential includes chronic mural hypertrophy or mild colitis/diverticulitis in the appropriate clinical setting. Severe aortobiiliac calcific atherosclerosis. Focal fusiform aortic aneurysm above celiac axis 3.6 cm.  -Abd XR- No abnormal bowel dilatation or pneumoperitoneum. Lumbar spine degenerative changes, dextroscoliosis. Abdominal and pelvic vascular calcification.. Heart size within normal limits, thoracic aortic calcification. Lungs and mediastinum are unremarkable. Stable bony structures. Elevation of the left hemidiaphragm.  -Concern for possible diverticulitis- started on ceftriaxone 1 g IV qd, flagyl 500 mg IV q8h  -monitor for bloody BMs  -med rec obtained from [facility]- [address]  Attempted to contact PCP- Dr. Jeff Corral- [facility]   [phone]

## 2021-03-10 NOTE — PROGRESS NOTE ADULT - PROBLEM SELECTOR PLAN 7
DVT ppx - Lovenox  GI ppx - Pantoprazole 40 mg PO Qd Patient reports he abuses crack cocaine and marijuana. As per patient he was planning to go to drug rehabilitation center today, however had seizure prior to presentation to clinic. UTox positive for cocaine metabolites and cannabinoid metabolites.  - Continue to monitor, NTD at this time Patient with a history of hypertension. Reports he takes 'a blood pressure pill' at home 'sometimes'. Does not check ambulatory BPs. Patient pressure on admission is hypertensive to 162/102.  - Start amlodipine 5 mg PO Qd for now- increase as needed

## 2021-03-11 ENCOUNTER — FORM ENCOUNTER (OUTPATIENT)
Age: 66
End: 2021-03-11

## 2021-03-11 LAB
4/8 RATIO: 0.18 RATIO — LOW (ref 0.9–3.6)
ABS CD8: 212 /UL — SIGNIFICANT CHANGE UP (ref 142–740)
ALBUMIN SERPL ELPH-MCNC: 2.6 G/DL — LOW (ref 3.3–5)
ALP SERPL-CCNC: 35 U/L — LOW (ref 40–120)
ALT FLD-CCNC: 9 U/L — LOW (ref 10–45)
ANION GAP SERPL CALC-SCNC: 9 MMOL/L — SIGNIFICANT CHANGE UP (ref 5–17)
AST SERPL-CCNC: 19 U/L — SIGNIFICANT CHANGE UP (ref 10–40)
BASOPHILS # BLD AUTO: 0 K/UL — SIGNIFICANT CHANGE UP (ref 0–0.2)
BASOPHILS NFR BLD AUTO: 0 % — SIGNIFICANT CHANGE UP (ref 0–2)
BILIRUB SERPL-MCNC: 0.3 MG/DL — SIGNIFICANT CHANGE UP (ref 0.2–1.2)
BUN SERPL-MCNC: 23 MG/DL — SIGNIFICANT CHANGE UP (ref 7–23)
CALCIUM SERPL-MCNC: 8.4 MG/DL — SIGNIFICANT CHANGE UP (ref 8.4–10.5)
CD16+CD56+ CELLS NFR BLD: 14 % — SIGNIFICANT CHANGE UP (ref 5–23)
CD16+CD56+ CELLS NFR SPEC: 58 /UL — LOW (ref 71–410)
CD19 BLASTS SPEC-ACNC: 35 /UL — LOW (ref 84–469)
CD19 BLASTS SPEC-ACNC: 8 % — SIGNIFICANT CHANGE UP (ref 6–24)
CD3 BLASTS SPEC-ACNC: 303 /UL — LOW (ref 672–1870)
CD3 BLASTS SPEC-ACNC: 76 % — SIGNIFICANT CHANGE UP (ref 59–83)
CD4 %: 10 % — LOW (ref 30–62)
CD8 %: 54 % — HIGH (ref 12–36)
CHLORIDE SERPL-SCNC: 109 MMOL/L — HIGH (ref 96–108)
CO2 SERPL-SCNC: 20 MMOL/L — LOW (ref 22–31)
CREAT SERPL-MCNC: 1.7 MG/DL — HIGH (ref 0.5–1.3)
DACRYOCYTES BLD QL SMEAR: SLIGHT — SIGNIFICANT CHANGE UP
ELLIPTOCYTES BLD QL SMEAR: SLIGHT — SIGNIFICANT CHANGE UP
EOSINOPHIL # BLD AUTO: 0.1 K/UL — SIGNIFICANT CHANGE UP (ref 0–0.5)
EOSINOPHIL NFR BLD AUTO: 6.4 % — HIGH (ref 0–6)
GIANT PLATELETS BLD QL SMEAR: PRESENT — SIGNIFICANT CHANGE UP
GLUCOSE BLDC GLUCOMTR-MCNC: 120 MG/DL — HIGH (ref 70–99)
GLUCOSE SERPL-MCNC: 101 MG/DL — HIGH (ref 70–99)
HCT VFR BLD CALC: 29.6 % — LOW (ref 39–50)
HGB BLD-MCNC: 8.9 G/DL — LOW (ref 13–17)
HIV-1 VIRAL LOAD RESULT: ABNORMAL
HIV1 RNA # SERPL NAA+PROBE: SIGNIFICANT CHANGE UP
HIV1 RNA SER-IMP: SIGNIFICANT CHANGE UP
HIV1 RNA SERPL NAA+PROBE-ACNC: ABNORMAL
HIV1 RNA SERPL NAA+PROBE-LOG#: 4.51 — SIGNIFICANT CHANGE UP
LYMPHOCYTES # BLD AUTO: 0.37 K/UL — LOW (ref 1–3.3)
LYMPHOCYTES # BLD AUTO: 23.6 % — SIGNIFICANT CHANGE UP (ref 13–44)
MACROCYTES BLD QL: SLIGHT — SIGNIFICANT CHANGE UP
MAGNESIUM SERPL-MCNC: 1.9 MG/DL — SIGNIFICANT CHANGE UP (ref 1.6–2.6)
MANUAL SMEAR VERIFICATION: SIGNIFICANT CHANGE UP
MCHC RBC-ENTMCNC: 29.3 PG — SIGNIFICANT CHANGE UP (ref 27–34)
MCHC RBC-ENTMCNC: 30.1 GM/DL — LOW (ref 32–36)
MCV RBC AUTO: 97.4 FL — SIGNIFICANT CHANGE UP (ref 80–100)
MONOCYTES # BLD AUTO: 0.17 K/UL — SIGNIFICANT CHANGE UP (ref 0–0.9)
MONOCYTES NFR BLD AUTO: 10.9 % — SIGNIFICANT CHANGE UP (ref 2–14)
NEUTROPHILS # BLD AUTO: 0.87 K/UL — LOW (ref 1.8–7.4)
NEUTROPHILS NFR BLD AUTO: 55.5 % — SIGNIFICANT CHANGE UP (ref 43–77)
NRBC # BLD: 1 /100 — HIGH (ref 0–0)
NRBC # BLD: SIGNIFICANT CHANGE UP /100 WBCS (ref 0–0)
OVALOCYTES BLD QL SMEAR: SLIGHT — SIGNIFICANT CHANGE UP
PHOSPHATE SERPL-MCNC: 3.8 MG/DL — SIGNIFICANT CHANGE UP (ref 2.5–4.5)
PLAT MORPH BLD: ABNORMAL
PLATELET # BLD AUTO: 45 K/UL — LOW (ref 150–400)
POIKILOCYTOSIS BLD QL AUTO: SLIGHT — SIGNIFICANT CHANGE UP
POTASSIUM SERPL-MCNC: 4.7 MMOL/L — SIGNIFICANT CHANGE UP (ref 3.5–5.3)
POTASSIUM SERPL-SCNC: 4.7 MMOL/L — SIGNIFICANT CHANGE UP (ref 3.5–5.3)
PROT SERPL-MCNC: 8.4 G/DL — HIGH (ref 6–8.3)
RBC # BLD: 3.04 M/UL — LOW (ref 4.2–5.8)
RBC # FLD: 15.4 % — HIGH (ref 10.3–14.5)
RBC BLD AUTO: ABNORMAL
SCHISTOCYTES BLD QL AUTO: SLIGHT — SIGNIFICANT CHANGE UP
SMUDGE CELLS # BLD: PRESENT — SIGNIFICANT CHANGE UP
SODIUM SERPL-SCNC: 138 MMOL/L — SIGNIFICANT CHANGE UP (ref 135–145)
T-CELL CD4 SUBSET PNL BLD: 37 /UL — LOW (ref 489–1457)
VALPROATE SERPL-MCNC: 29.2 UG/ML — LOW (ref 50–100)
VARIANT LYMPHS # BLD: 3.6 % — SIGNIFICANT CHANGE UP (ref 0–6)
WBC # BLD: 1.57 K/UL — LOW (ref 3.8–10.5)
WBC # FLD AUTO: 1.57 K/UL — LOW (ref 3.8–10.5)

## 2021-03-11 PROCEDURE — 99233 SBSQ HOSP IP/OBS HIGH 50: CPT

## 2021-03-11 PROCEDURE — 93010 ELECTROCARDIOGRAM REPORT: CPT

## 2021-03-11 RX ORDER — ACETAMINOPHEN 500 MG
650 TABLET ORAL EVERY 6 HOURS
Refills: 0 | Status: DISCONTINUED | OUTPATIENT
Start: 2021-03-11 | End: 2021-03-17

## 2021-03-11 RX ORDER — ASPIRIN/CALCIUM CARB/MAGNESIUM 324 MG
81 TABLET ORAL DAILY
Refills: 0 | Status: DISCONTINUED | OUTPATIENT
Start: 2021-03-11 | End: 2021-03-17

## 2021-03-11 RX ORDER — LIDOCAINE 4 G/100G
1 CREAM TOPICAL DAILY
Refills: 0 | Status: DISCONTINUED | OUTPATIENT
Start: 2021-03-11 | End: 2021-03-17

## 2021-03-11 RX ORDER — ATORVASTATIN CALCIUM 80 MG/1
40 TABLET, FILM COATED ORAL AT BEDTIME
Refills: 0 | Status: DISCONTINUED | OUTPATIENT
Start: 2021-03-11 | End: 2021-03-17

## 2021-03-11 RX ADMIN — Medication 81 MILLIGRAM(S): at 19:08

## 2021-03-11 RX ADMIN — Medication 100 MILLIGRAM(S): at 21:33

## 2021-03-11 RX ADMIN — Medication 650 MILLIGRAM(S): at 19:08

## 2021-03-11 RX ADMIN — LIDOCAINE 1 PATCH: 4 CREAM TOPICAL at 19:08

## 2021-03-11 RX ADMIN — Medication 100 MILLIGRAM(S): at 05:40

## 2021-03-11 RX ADMIN — CEFTRIAXONE 100 MILLIGRAM(S): 500 INJECTION, POWDER, FOR SOLUTION INTRAMUSCULAR; INTRAVENOUS at 11:27

## 2021-03-11 RX ADMIN — Medication 100 MILLIGRAM(S): at 13:20

## 2021-03-11 RX ADMIN — PANTOPRAZOLE SODIUM 40 MILLIGRAM(S): 20 TABLET, DELAYED RELEASE ORAL at 05:40

## 2021-03-11 RX ADMIN — BICTEGRAVIR SODIUM, EMTRICITABINE, AND TENOFOVIR ALAFENAMIDE FUMARATE 1 TABLET(S): 30; 120; 15 TABLET ORAL at 11:28

## 2021-03-11 RX ADMIN — Medication 650 MILLIGRAM(S): at 11:29

## 2021-03-11 RX ADMIN — AMLODIPINE BESYLATE 5 MILLIGRAM(S): 2.5 TABLET ORAL at 05:40

## 2021-03-11 RX ADMIN — ATORVASTATIN CALCIUM 40 MILLIGRAM(S): 80 TABLET, FILM COATED ORAL at 21:33

## 2021-03-11 RX ADMIN — LIDOCAINE 1 PATCH: 4 CREAM TOPICAL at 11:29

## 2021-03-11 RX ADMIN — Medication 650 MILLIGRAM(S): at 12:56

## 2021-03-11 RX ADMIN — DIVALPROEX SODIUM 1000 MILLIGRAM(S): 500 TABLET, DELAYED RELEASE ORAL at 21:33

## 2021-03-11 NOTE — PROGRESS NOTE ADULT - ASSESSMENT
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  Admitted on 3/9 for 2 unwitnessed reported seizure events. EEG thus far has not shown epileptiform activity.  History obtained from patient indicate uncontrolled seizure events, and would prefer a once daily regimen for seizure medication. Initiated mood stabilizer/epilepsy medication VPA ER 1000mg/d, but had report of an aura last night (despite dual therapy LEV + VPA)    Recommendations:  continue off Keppra 500mg Q12hrs  continue Depakote 1000mg ER QHS starting tonight   Please obtain valproic acid level in AM  Continue vEEG monitoring  Maintain seizure and fall precautions 65M PMH HIV, EtOH (last drink 2 weeks ago), substance abuse (crack and cannabis), HTN, MI, CVA x 2 with residual right sided weakness, epilepsy admitted on Keppra/levetiracetam 500mg but not compliant.  Admitted on 3/9 for 2 unwitnessed reported seizure events. EEG thus far has not shown epileptiform activity.  History obtained from patient indicate uncontrolled seizure events, and would prefer a once daily regimen for seizure medication. Initiated mood stabilizer/epilepsy medication VPA ER 1000mg/d, but had report of an aura last night (despite dual therapy LEV + VPA)    Recommendations:  continue off Keppra 500mg Q12hrs  continue Depakote 1000mg ER QHS starting tonight   Please obtain valproic acid level in AM; higher levels more likely to contribute to thrombocytopenia and reduce platelet function, his counts are in the low 60's currently.  Continue vEEG monitoring  Maintain seizure and fall precautions

## 2021-03-11 NOTE — PROGRESS NOTE ADULT - PROBLEM SELECTOR PLAN 3
Patient with a history of known HIV, reports he takes no medications at home for HIV.  - f/u T cell count, viral load count   - HIV consulted-. Started on biktarvy and atovaquone (takes bactrim outpt. but has VINAY vs CKD of Scr 1.5) as per Dr. Ayala, HIV doc. Attempted to call PCP. Patient with a history of known HIV, reports he takes no medications at home for HIV.  - f/u T cell count, viral load count -CD4 count 37, viral load 32k this admission  - HIV consulted-. Started on biktarvy and atovaquone (takes bactrim outpt. but has VINAY vs CKD of Scr 1.5) as per Dr. Ayala, HIV doc. Attempted to call PCP.

## 2021-03-11 NOTE — PROGRESS NOTE ADULT - ASSESSMENT
65M lives in shelter PMH HIV (unclear if patient takes medications), EtOH abuse last about 2 weeks ago), substance abuse of cocaine and cannabis), HTN, MI in past, CVA x2 (residual right sided weakness), epilepsy presents with abdominal pain after reported seizure. Received Keppra in ED and admitted to medicine for further evaluation and treatment. 65M lives in shelter PMH HIV (unclear if patient takes medications), EtOH abuse last about 2 weeks ago), substance abuse of cocaine and cannabis), HTN, MI in past, CVA x2 (residual right sided weakness), epilepsy presents after reported seizure, with abdominal pain, being treated for diverticulitis on IV abx. Epilepsy/HIV (Dr. Ayala) consulted.

## 2021-03-11 NOTE — PROGRESS NOTE ADULT - PROBLEM SELECTOR PLAN 6
Patient with a history of DM type 2, reports he takes no medications for this at home.  - HgA1c 5.4- well controlled   - mISS for now, no need to start nutritional or basal insulin yet

## 2021-03-11 NOTE — PROGRESS NOTE ADULT - PROBLEM SELECTOR PLAN 1
-Pt. reporting chronic abdominal pain - 8-9/10 in severity, abdomen tender to palpation throughout, especially in the RLQ, ND; no rebound or guarding  -CT A/P- Sigmoid diverticulosis with mild mural thickening, differential includes chronic mural hypertrophy or mild colitis/diverticulitis in the appropriate clinical setting. Severe aortobiiliac calcific atherosclerosis. Focal fusiform aortic aneurysm above celiac axis 3.6 cm.  -Abd XR- No abnormal bowel dilatation or pneumoperitoneum. Lumbar spine degenerative changes, dextroscoliosis. Abdominal and pelvic vascular calcification.. Heart size within normal limits, thoracic aortic calcification. Lungs and mediastinum are unremarkable. Stable bony structures. Elevation of the left hemidiaphragm.  -Concern for possible diverticulitis- started on ceftriaxone 1 g IV qd, flagyl 500 mg IV q8h  -monitor for bloody BMs  -med rec obtained from [facility]- [address]  Attempted to contact PCP- Dr. Jeff Corral- [facility]   [phone] -Pt. reporting chronic abdominal pain - 8-9/10 in severity, abdomen tender to palpation throughout, especially in the RLQ, ND; no rebound or guarding  -CT A/P- Sigmoid diverticulosis with mild mural thickening, differential includes chronic mural hypertrophy or mild colitis/diverticulitis in the appropriate clinical setting. Severe aortobiiliac calcific atherosclerosis. Focal fusiform aortic aneurysm above celiac axis 3.6 cm.  -Abd XR- No abnormal bowel dilatation or pneumoperitoneum. Lumbar spine degenerative changes, dextroscoliosis. Abdominal and pelvic vascular calcification.. Heart size within normal limits, thoracic aortic calcification. Lungs and mediastinum are unremarkable. Stable bony structures. Elevation of the left hemidiaphragm.  -Concern for possible diverticulitis- started on ceftriaxone 1 g IV qd, flagyl 500 mg IV q8h  -monitor for bloody BMs  -med rec obtained from [facility]- [address]  -Attempted to contact PCP- Dr. Jeff Corral- [facility]   [phone]  -PCP office will fax records to [facility] 3/11 -Pt. reporting chronic abdominal pain - 8-9/10 in severity, abdomen tender to palpation throughout, especially in the RLQ, ND; no rebound or guarding  -CT A/P- Sigmoid diverticulosis with mild mural thickening, differential includes chronic mural hypertrophy or mild colitis/diverticulitis in the appropriate clinical setting. Severe aortobiiliac calcific atherosclerosis. Focal fusiform aortic aneurysm above celiac axis 3.6 cm.  -Abd XR- No abnormal bowel dilatation or pneumoperitoneum. Lumbar spine degenerative changes, dextroscoliosis. Abdominal and pelvic vascular calcification.. Heart size within normal limits, thoracic aortic calcification. Lungs and mediastinum are unremarkable. Stable bony structures. Elevation of the left hemidiaphragm.  -Concern for possible diverticulitis- started on ceftriaxone 1 g IV qd, flagyl 500 mg IV q8h  -tylenol 650 mg q6h standing for pain  -monitor for bloody BMs  -med rec obtained from [facility]- [address]  -Attempted to contact PCP- Dr. Jeff Corral- [facility]   [phone]  -PCP office will fax records to [facility] 3/11

## 2021-03-11 NOTE — PROGRESS NOTE ADULT - PROBLEM SELECTOR PLAN 2
Patient with a longstanding history of seizures, is supposed to take Keppra and reports he takes 'two two times a day', however reports he has not been taking his medications for the past 2 weeks. Given 1000 mg Keppra in the ED.   - vEEG ordered  - Keppra level as add on lab  - Will start keppra 500 mg PO BID- f/u epilepsy recs   - ativan 2 mg IV PRN for seizure activity Patient with a longstanding history of seizures, is supposed to take Keppra and reports he takes 'two two times a day', however reports he has not been taking his medications for the past 2 weeks. Given 1000 mg Keppra in the ED.   - vEEG ordered  - Keppra level as add on lab  - Started on keppra 500 mg PO BID- D/juan j keppra on 3/10-> switched to depakote 1000 mg ER qhs and ordered for depakote level.  -Pt. 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 Given 1000 mg Keppra in the ED.   - vEEG ordered  - Keppra level as add on lab  - Started on keppra 500 mg PO BID- D/juan j keppra on 3/10-> switched to depakote 1000 mg ER qhs and ordered for depakote level.  -Pt. 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  -EKG- NSR  -trop negative

## 2021-03-11 NOTE — PROGRESS NOTE ADULT - PROBLEM SELECTOR PLAN 8
Patient reports he abuses crack cocaine and marijuana. As per patient he was planning to go to drug rehabilitation center today, however had seizure prior to presentation to clinic. UTox positive for cocaine metabolites and cannabinoid metabolites.  - Continue to monitor, NTD at this time

## 2021-03-11 NOTE — PROGRESS NOTE ADULT - SUBJECTIVE AND OBJECTIVE BOX
EPILEPSY F/U NOTE:    Patient refused EEG last night.  Reports he may have had an aura/seizure last night around 2am.  Had started on VPA ER 1000mg, but had also the full dose of LEV 500mg bid as well.    REports no adverse effects on current regimen.    Medical issues continue (diverticulitis) and will remain inpatient.      HPI:  65M PMH HIV, EtOH (last drink 2 weeks ago), substance abuse (crack and cannabis), HTN, MI, CVA x 2 with residual right sided weakness, epilepsy on Keppra presents from shelter. Patient reports he was supposed to go to drug rehabilitation for substance abuse today, however he reports he had a seizure today. Reports incontinence, loss of consciousness reports he does not know how long symptoms went on for. Reports he did not fall or hit his head during seizure event. Patient reports he has been intermittently compliant with his medications and has taken his Keppra intermittently over the past 2 weeks. Reports headache and abdominal pain. ROS otherwise negative.     Epilepsy consulted for report of 2 unwitnessed seizures as per patient. Patient states that he had febrile seizures as a baby, and since then have had seizures. His typical event has been described to him as whole body shaking that is usually preceded with a burning smell. He states that he experiences a weird abdominal feeling following his events, and is unsure of its duration. The frequency of his events are at least once per week, and he has been on Keppra for a long period of time. He has tried Dilantin in the past but believes there are no other medications used.  He reports tongue bite and urinary incontinence in the past with his events. He reports the most recent event was yesterday, and recalls the burning smell but nothing else, awakening after some time.      Epilepsy risk factors:  Head injury with subsequent LOC?: Denies  Febrile seizures in infancy?: Yes  Hx of CNS infection?: Denies  Family hx of epilepsy?: Denies  Known CNS pathology?: CVA *2     Prior AEDs:  Dilantin    Review of Systems:  CONSTITUTIONAL:  No weight loss, fever, chills, weakness or fatigue.  HEENT:  Eyes:  No visual loss, blurred vision, double vision or yellow sclerae. Ears, Nose, Throat:  No hearing loss, sneezing, congestion, runny nose or sore throat.  SKIN:  No rash or itching.  CARDIOVASCULAR:  No chest pain, chest pressure or chest discomfort. No palpitations or edema.  RESPIRATORY:  No shortness of breath, cough or sputum.  GASTROINTESTINAL:  No anorexia, nausea, vomiting or diarrhea. No abdominal pain or blood.  GENITOURINARY: No Burning on urination.   NEUROLOGICAL:  see HPI  MUSCULOSKELETAL:  No muscle, back pain, joint pain or stiffness.  HEMATOLOGIC:  No anemia, bleeding or bruising.  LYMPHATICS:  No enlarged nodes. No history of splenectomy.  PSYCHIATRIC:  No history of depression or anxiety.  ENDOCRINOLOGIC:  No reports of sweating, cold or heat intolerance. No polyuria or polydipsia.  ALLERGIES:  No history of asthma, hives, eczema or rhinitis.     PAST MEDICAL & SURGICAL HISTORY:  Diabetes type 2, uncontrolled  Bipolar disorder  Cocaine use  HIV (human immunodeficiency virus infection)  Essential hypertension     Allergies:  No Known Allergies     MEDICATIONS  (STANDING):  amLODIPine   Tablet 5 milliGRAM(s) Oral daily  atovaquone  Suspension 1500 milliGRAM(s) Oral daily  bictegravir 50 mG/emtricitabine 200 mG/tenofovir alafenamide 25 mG (BIKTARVY) 1 Tablet(s) Oral daily  cefTRIAXone   IVPB 1000 milliGRAM(s) IV Intermittent every 24 hours  dextrose 40% Gel 15 Gram(s) Oral once  dextrose 5%. 1000 milliLiter(s) (50 mL/Hr) IV Continuous <Continuous>  dextrose 5%. 1000 milliLiter(s) (100 mL/Hr) IV Continuous <Continuous>  dextrose 50% Injectable 25 Gram(s) IV Push once  dextrose 50% Injectable 12.5 Gram(s) IV Push once  dextrose 50% Injectable 25 Gram(s) IV Push once  glucagon  Injectable 1 milliGRAM(s) IntraMuscular once  insulin lispro (ADMELOG) corrective regimen sliding scale   SubCutaneous Before meals and at bedtime  levETIRAcetam 500 milliGRAM(s) Oral two times a day  metroNIDAZOLE  IVPB 500 milliGRAM(s) IV Intermittent every 8 hours  pantoprazole    Tablet 40 milliGRAM(s) Oral before breakfast    MEDICATIONS  (PRN):  acetaminophen   Tablet .. 650 milliGRAM(s) Oral every 6 hours PRN Moderate Pain (4 - 6)  LORazepam   Injectable 2 milliGRAM(s) IV Push once PRN Seizure activity    VITAL SIGNS:  T(C): 37.1 (03-10-21 @ 12:24), Max: 37.1 (03-10-21 @ 06:16)  HR: 78 (03-10-21 @ 12:24) (72 - 87)  BP: 142/78 (03-10-21 @ 12:24) (134/75 - 162/102)  RR: 18 (03-10-21 @ 12:24) (18 - 19)  SpO2: 99% (03-10-21 @ 12:24) (98% - 100%)  Wt(kg): --    PHYSICAL EXAM:  Psychiatric: calm    Cognitive:  screening negative.    Cranial Nerves:  II: III/IV/VI: PERRLA 3mm brisk EOMF No nystagmus  V1V2V3: Symmetric, VII: Face appears symmetric VIII: Normal to screening, IX/X: Palate Elevates Symmetrical  XI: Trapezius Symmetric  Motor:  Coordination/Gait:  Finger-nose-finger some difficulty w/ LUE, Gait deferred   Reflexes:

## 2021-03-11 NOTE — PROGRESS NOTE ADULT - SUBJECTIVE AND OBJECTIVE BOX
INTERVAL HPI/OVERNIGHT EVENTS:    SUBJECTIVE: Patient seen and examined at bedside.    OBJECTIVE:    VITAL SIGNS:  ICU Vital Signs Last 24 Hrs  T(C): 36.7 (11 Mar 2021 05:33), Max: 37.4 (10 Mar 2021 20:17)  T(F): 98 (11 Mar 2021 05:33), Max: 99.4 (10 Mar 2021 20:17)  HR: 87 (11 Mar 2021 05:33) (78 - 87)  BP: 137/79 (11 Mar 2021 05:33) (134/83 - 142/78)  BP(mean): --  ABP: --  ABP(mean): --  RR: 18 (11 Mar 2021 05:33) (17 - 18)  SpO2: 99% (11 Mar 2021 05:33) (99% - 99%)        CAPILLARY BLOOD GLUCOSE      POCT Blood Glucose.: 111 mg/dL (10 Mar 2021 22:03)      PHYSICAL EXAM:          MEDICATIONS:  MEDICATIONS  (STANDING):  amLODIPine   Tablet 5 milliGRAM(s) Oral daily  atovaquone  Suspension 1500 milliGRAM(s) Oral daily  bictegravir 50 mG/emtricitabine 200 mG/tenofovir alafenamide 25 mG (BIKTARVY) 1 Tablet(s) Oral daily  cefTRIAXone   IVPB 1000 milliGRAM(s) IV Intermittent every 24 hours  dextrose 40% Gel 15 Gram(s) Oral once  dextrose 5%. 1000 milliLiter(s) (50 mL/Hr) IV Continuous <Continuous>  dextrose 5%. 1000 milliLiter(s) (100 mL/Hr) IV Continuous <Continuous>  dextrose 50% Injectable 25 Gram(s) IV Push once  dextrose 50% Injectable 12.5 Gram(s) IV Push once  dextrose 50% Injectable 25 Gram(s) IV Push once  diVALproex ER 1000 milliGRAM(s) Oral at bedtime  glucagon  Injectable 1 milliGRAM(s) IntraMuscular once  insulin lispro (ADMELOG) corrective regimen sliding scale   SubCutaneous Before meals and at bedtime  metroNIDAZOLE  IVPB 500 milliGRAM(s) IV Intermittent every 8 hours  pantoprazole    Tablet 40 milliGRAM(s) Oral before breakfast    MEDICATIONS  (PRN):  acetaminophen   Tablet .. 650 milliGRAM(s) Oral every 6 hours PRN Moderate Pain (4 - 6)  LORazepam   Injectable 2 milliGRAM(s) IV Push once PRN Seizure activity      ALLERGIES:  Allergies    No Known Allergies    Intolerances        LABS:                        9.1    1.62  )-----------( 61       ( 10 Mar 2021 14:40 )             28.7     03-10    135  |  108  |  19  ----------------------------<  114<H>  4.3   |  20<L>  |  1.48<H>    Ca    8.6      10 Mar 2021 14:40  Phos  3.3     -10  Mg     1.6     03-10    TPro  7.9  /  Alb  2.7<L>  /  TBili  0.3  /  DBili  x   /  AST  17  /  ALT  8<L>  /  AlkPhos  36<L>  10    PT/INR - ( 09 Mar 2021 14:57 )   PT: 11.7 sec;   INR: 0.97            Urinalysis Basic - ( 09 Mar 2021 15:54 )    Color: Yellow / Appearance: Clear / S.025 / pH: x  Gluc: x / Ketone: NEGATIVE  / Bili: Negative / Urobili: 0.2 E.U./dL   Blood: x / Protein: 30 mg/dL / Nitrite: NEGATIVE   Leuk Esterase: NEGATIVE / RBC: < 5 /HPF / WBC < 5 /HPF   Sq Epi: x / Non Sq Epi: x / Bacteria: Few /HPF        RADIOLOGY & ADDITIONAL TESTS: Reviewed. INTERVAL HPI/OVERNIGHT EVENTS:  MARLY.    SUBJECTIVE: Patient seen and examined at bedside. Pt. A&Ox2 (self, hospital). He is reporting feeling weak, reporting he had a seizure like episode around 2-3 AM, although vEEG leads were already off by that time- he had refused to wear them since 3/10 evening. He is reporting 9/10 chest pain- as if someone has punched him- in the sternum, nonradiating, localized, reproducible on palpation. Reports stomach pain- 10/10, and a headache /10. Denies nausea, vomiting, diarrhea, constipation, urinary changes. Also notes new blurry vision for the past 2 days- with 2 floaters, no eye pain, no flashes, able to read text close up, but not the board across from him. Reports he used to wear glasses.     OBJECTIVE:    VITAL SIGNS:  ICU Vital Signs Last 24 Hrs  T(C): 36.7 (11 Mar 2021 05:33), Max: 37.4 (10 Mar 2021 20:17)  T(F): 98 (11 Mar 2021 05:33), Max: 99.4 (10 Mar 2021 20:17)  HR: 87 (11 Mar 2021 05:33) (78 - 87)  BP: 137/79 (11 Mar 2021 05:33) (134/83 - 142/78)  BP(mean): --  ABP: --  ABP(mean): --  RR: 18 (11 Mar 2021 05:33) (17 - 18)  SpO2: 99% (11 Mar 2021 05:33) (99% - 99%)        CAPILLARY BLOOD GLUCOSE      POCT Blood Glucose.: 111 mg/dL (10 Mar 2021 22:03)      PHYSICAL EXAM:  Constitutional: A&OX2  Head: NC/AT  Eyes: PERRL, EOMI, anicteric sclera  ENT: MMM  Neck: supple; no JVD or thyromegaly  Respiratory: CTA B/L; no W/R/R,   Cardiac: +S1/S2; RRR; no M/R/G;   Gastrointestinal: abdomen tender to palpation throughout, especially in the RLQ, ND; no rebound or guarding  Extremities: WWP, no clubbing or cyanosis; no peripheral edema  Musculoskeletal:  resting tremor in bilateral hands, 1-2/5 strength in RUE, 1-2/5 strength in RLE , 5/5 in LUE/LLE  Neurologic: AAOx3; CNII-XII grossly intact.    MEDICATIONS:  MEDICATIONS  (STANDING):  amLODIPine   Tablet 5 milliGRAM(s) Oral daily  atovaquone  Suspension 1500 milliGRAM(s) Oral daily  bictegravir 50 mG/emtricitabine 200 mG/tenofovir alafenamide 25 mG (BIKTARVY) 1 Tablet(s) Oral daily  cefTRIAXone   IVPB 1000 milliGRAM(s) IV Intermittent every 24 hours  dextrose 40% Gel 15 Gram(s) Oral once  dextrose 5%. 1000 milliLiter(s) (50 mL/Hr) IV Continuous <Continuous>  dextrose 5%. 1000 milliLiter(s) (100 mL/Hr) IV Continuous <Continuous>  dextrose 50% Injectable 25 Gram(s) IV Push once  dextrose 50% Injectable 12.5 Gram(s) IV Push once  dextrose 50% Injectable 25 Gram(s) IV Push once  diVALproex ER 1000 milliGRAM(s) Oral at bedtime  glucagon  Injectable 1 milliGRAM(s) IntraMuscular once  insulin lispro (ADMELOG) corrective regimen sliding scale   SubCutaneous Before meals and at bedtime  metroNIDAZOLE  IVPB 500 milliGRAM(s) IV Intermittent every 8 hours  pantoprazole    Tablet 40 milliGRAM(s) Oral before breakfast    MEDICATIONS  (PRN):  acetaminophen   Tablet .. 650 milliGRAM(s) Oral every 6 hours PRN Moderate Pain (4 - 6)  LORazepam   Injectable 2 milliGRAM(s) IV Push once PRN Seizure activity      ALLERGIES:  Allergies    No Known Allergies    Intolerances        LABS:                        9.1    1.62  )-----------( 61       ( 10 Mar 2021 14:40 )             28.7     03-10    135  |  108  |  19  ----------------------------<  114<H>  4.3   |  20<L>  |  1.48<H>    Ca    8.6      10 Mar 2021 14:40  Phos  3.3     03-10  Mg     1.6     -10    TPro  7.9  /  Alb  2.7<L>  /  TBili  0.3  /  DBili  x   /  AST  17  /  ALT  8<L>  /  AlkPhos  36<L>  03-10    PT/INR - ( 09 Mar 2021 14:57 )   PT: 11.7 sec;   INR: 0.97            Urinalysis Basic - ( 09 Mar 2021 15:54 )    Color: Yellow / Appearance: Clear / S.025 / pH: x  Gluc: x / Ketone: NEGATIVE  / Bili: Negative / Urobili: 0.2 E.U./dL   Blood: x / Protein: 30 mg/dL / Nitrite: NEGATIVE   Leuk Esterase: NEGATIVE / RBC: < 5 /HPF / WBC < 5 /HPF   Sq Epi: x / Non Sq Epi: x / Bacteria: Few /HPF        RADIOLOGY & ADDITIONAL TESTS: Reviewed. INTERVAL HPI/OVERNIGHT EVENTS:  MARLY.    SUBJECTIVE: Patient seen and examined at bedside. Pt. A&Ox2 (self, hospital). He is reporting feeling weak, reporting he had a seizure like episode around 2-3 AM, although vEEG leads were already off by that time- he had refused to wear them since 3/10 evening. He is reporting 9/10 chest pain- as if someone has punched him- in the sternum, nonradiating, localized, reproducible on palpation, no diaphoresis, radiation to arms. Reports stomach pain- 10/10, and a headache /10. Denies nausea, vomiting, diarrhea, constipation, urinary changes. Also notes new blurry vision for the past 2 days- with 2 floaters, no eye pain, no flashes, able to read text close up, but not the board across from him. Reports he used to wear glasses.     OBJECTIVE:    VITAL SIGNS:  ICU Vital Signs Last 24 Hrs  T(C): 36.7 (11 Mar 2021 05:33), Max: 37.4 (10 Mar 2021 20:17)  T(F): 98 (11 Mar 2021 05:33), Max: 99.4 (10 Mar 2021 20:17)  HR: 87 (11 Mar 2021 05:33) (78 - 87)  BP: 137/79 (11 Mar 2021 05:33) (134/83 - 142/78)  BP(mean): --  ABP: --  ABP(mean): --  RR: 18 (11 Mar 2021 05:33) (17 - 18)  SpO2: 99% (11 Mar 2021 05:33) (99% - 99%)        CAPILLARY BLOOD GLUCOSE      POCT Blood Glucose.: 111 mg/dL (10 Mar 2021 22:03)      PHYSICAL EXAM:  Constitutional: A&OX2  Head: NC/AT  Eyes: PERRL, EOMI, anicteric sclera  ENT: MMM  Neck: supple; no JVD or thyromegaly  Respiratory: CTA B/L; no W/R/R,   Cardiac: +S1/S2; RRR; no M/R/G;   Gastrointestinal: abdomen tender to palpation throughout, especially in the RLQ, ND; no rebound or guarding  Extremities: WWP, no clubbing or cyanosis; no peripheral edema  Musculoskeletal:  resting tremor in bilateral hands, 1-2/5 strength in RUE, 1-2/5 strength in RLE , 5/5 in LUE/LLE  Neurologic: AAOx3; CNII-XII grossly intact.    MEDICATIONS:  MEDICATIONS  (STANDING):  amLODIPine   Tablet 5 milliGRAM(s) Oral daily  atovaquone  Suspension 1500 milliGRAM(s) Oral daily  bictegravir 50 mG/emtricitabine 200 mG/tenofovir alafenamide 25 mG (BIKTARVY) 1 Tablet(s) Oral daily  cefTRIAXone   IVPB 1000 milliGRAM(s) IV Intermittent every 24 hours  dextrose 40% Gel 15 Gram(s) Oral once  dextrose 5%. 1000 milliLiter(s) (50 mL/Hr) IV Continuous <Continuous>  dextrose 5%. 1000 milliLiter(s) (100 mL/Hr) IV Continuous <Continuous>  dextrose 50% Injectable 25 Gram(s) IV Push once  dextrose 50% Injectable 12.5 Gram(s) IV Push once  dextrose 50% Injectable 25 Gram(s) IV Push once  diVALproex ER 1000 milliGRAM(s) Oral at bedtime  glucagon  Injectable 1 milliGRAM(s) IntraMuscular once  insulin lispro (ADMELOG) corrective regimen sliding scale   SubCutaneous Before meals and at bedtime  metroNIDAZOLE  IVPB 500 milliGRAM(s) IV Intermittent every 8 hours  pantoprazole    Tablet 40 milliGRAM(s) Oral before breakfast    MEDICATIONS  (PRN):  acetaminophen   Tablet .. 650 milliGRAM(s) Oral every 6 hours PRN Moderate Pain (4 - 6)  LORazepam   Injectable 2 milliGRAM(s) IV Push once PRN Seizure activity      ALLERGIES:  Allergies    No Known Allergies    Intolerances        LABS:                        9.1    1.62  )-----------( 61       ( 10 Mar 2021 14:40 )             28.7     03-10    135  |  108  |  19  ----------------------------<  114<H>  4.3   |  20<L>  |  1.48<H>    Ca    8.6      10 Mar 2021 14:40  Phos  3.3     03-10  Mg     1.6     -10    TPro  7.9  /  Alb  2.7<L>  /  TBili  0.3  /  DBili  x   /  AST  17  /  ALT  8<L>  /  AlkPhos  36<L>  03-10    PT/INR - ( 09 Mar 2021 14:57 )   PT: 11.7 sec;   INR: 0.97            Urinalysis Basic - ( 09 Mar 2021 15:54 )    Color: Yellow / Appearance: Clear / S.025 / pH: x  Gluc: x / Ketone: NEGATIVE  / Bili: Negative / Urobili: 0.2 E.U./dL   Blood: x / Protein: 30 mg/dL / Nitrite: NEGATIVE   Leuk Esterase: NEGATIVE / RBC: < 5 /HPF / WBC < 5 /HPF   Sq Epi: x / Non Sq Epi: x / Bacteria: Few /HPF        RADIOLOGY & ADDITIONAL TESTS: Reviewed.

## 2021-03-11 NOTE — CHART NOTE - NSCHARTNOTEFT_GEN_A_CORE
I saw and examined the patient yesterday at bedside.  I reviewed labs and images and notes and discussed with the team.    Long term HIV with challenges of drug use, housing and poor adherence.    CD4 and VL pending  States was on Biktarvy and was taking it mostly.    Pancytopenic with high creatinine of 1.5 with unknown baseline.  History of seizure disorder and with now diagnosis of diverticulosis.    No history of OIs.    Resume Biktarvy    DC bactrim due to kidney dysfunction and thrombocytopenia  Start Atovaquone.    Will FU I saw and examined the patient yesterday at bedside.  I reviewed labs and images and notes and discussed with the team.    Long term HIV with challenges of drug use, housing and poor adherence.    CD4 and VL pending  States was on Biktarvy and was taking it mostly.    Agree with history and PE performed by       Pancytopenic with high creatinine of 1.5 with unknown baseline.  History of seizure disorder and with now diagnosis of diverticulosis.    No history of OIs.    Resume Biktarvy    DC bactrim due to kidney dysfunction and thrombocytopenia  Start Atovaquone.    Will FU

## 2021-03-11 NOTE — PROGRESS NOTE ADULT - PROBLEM SELECTOR PLAN 7
Patient with a history of hypertension. Reports he takes 'a blood pressure pill' at home 'sometimes'. Does not check ambulatory BPs. Patient pressure on admission is hypertensive to 162/102.  - Start amlodipine 5 mg PO Qd for now- increase as needed

## 2021-03-11 NOTE — PROGRESS NOTE ADULT - PROBLEM SELECTOR PLAN 5
unclear if VINAY vs CKD (pt w/ hx. of HTN, and nonadherence to medications)  -Scr of 1.5 on admission  -trend CMP  -avoid nephrotoxic medications

## 2021-03-11 NOTE — PROGRESS NOTE ADULT - PROBLEM SELECTOR PLAN 4
Patient with thrombopenia, leukopenia, anemia- pancytopenic. Possible causes of thrombocytopenia include EtOH abuse vs HIV infection.   - Trend CBC  -no signs of bleeding carol

## 2021-03-11 NOTE — SBIRT NOTE ADULT - NSSBIRTALCPASSREFTXDET_GEN_A_CORE
Patient reported drinking 1.5-2 pints of Melissa daily. Patient has been using alcohol and substances since 22 years old. Patient has never been to rehab before. Patient stated that he was supposed to go to [facility] IP rehab prior to admission. Patient is open to any IP referral. SW will assist patient with referrals. SW provided patient with resource list.

## 2021-03-11 NOTE — PROGRESS NOTE ADULT - PROBLEM SELECTOR PLAN 10
F - none  E - replete PRN  N - diabetic dash/tlc  dvt ppx- holding in setting of thrombocytopenia   A - RMF

## 2021-03-12 LAB
ALBUMIN SERPL ELPH-MCNC: 3 G/DL — LOW (ref 3.3–5)
ALP SERPL-CCNC: 37 U/L — LOW (ref 40–120)
ALT FLD-CCNC: 9 U/L — LOW (ref 10–45)
ANION GAP SERPL CALC-SCNC: 10 MMOL/L — SIGNIFICANT CHANGE UP (ref 5–17)
ANISOCYTOSIS BLD QL: SIGNIFICANT CHANGE UP
AST SERPL-CCNC: 19 U/L — SIGNIFICANT CHANGE UP (ref 10–40)
BASOPHILS # BLD AUTO: 0.02 K/UL — SIGNIFICANT CHANGE UP (ref 0–0.2)
BASOPHILS NFR BLD AUTO: 0.9 % — SIGNIFICANT CHANGE UP (ref 0–2)
BILIRUB SERPL-MCNC: 0.3 MG/DL — SIGNIFICANT CHANGE UP (ref 0.2–1.2)
BUN SERPL-MCNC: 23 MG/DL — SIGNIFICANT CHANGE UP (ref 7–23)
CALCIUM SERPL-MCNC: 8.5 MG/DL — SIGNIFICANT CHANGE UP (ref 8.4–10.5)
CHLORIDE SERPL-SCNC: 106 MMOL/L — SIGNIFICANT CHANGE UP (ref 96–108)
CO2 SERPL-SCNC: 23 MMOL/L — SIGNIFICANT CHANGE UP (ref 22–31)
CREAT SERPL-MCNC: 1.56 MG/DL — HIGH (ref 0.5–1.3)
DACRYOCYTES BLD QL SMEAR: SLIGHT — SIGNIFICANT CHANGE UP
EOSINOPHIL # BLD AUTO: 0.13 K/UL — SIGNIFICANT CHANGE UP (ref 0–0.5)
EOSINOPHIL NFR BLD AUTO: 7.4 % — HIGH (ref 0–6)
GIANT PLATELETS BLD QL SMEAR: PRESENT — SIGNIFICANT CHANGE UP
GLUCOSE BLDC GLUCOMTR-MCNC: 120 MG/DL — HIGH (ref 70–99)
GLUCOSE BLDC GLUCOMTR-MCNC: 123 MG/DL — HIGH (ref 70–99)
GLUCOSE BLDC GLUCOMTR-MCNC: 130 MG/DL — HIGH (ref 70–99)
GLUCOSE SERPL-MCNC: 90 MG/DL — SIGNIFICANT CHANGE UP (ref 70–99)
HCT VFR BLD CALC: 30.8 % — LOW (ref 39–50)
HGB BLD-MCNC: 9.4 G/DL — LOW (ref 13–17)
HYPOCHROMIA BLD QL: SLIGHT — SIGNIFICANT CHANGE UP
LYMPHOCYTES # BLD AUTO: 0.37 K/UL — LOW (ref 1–3.3)
LYMPHOCYTES # BLD AUTO: 21.1 % — SIGNIFICANT CHANGE UP (ref 13–44)
MACROCYTES BLD QL: SLIGHT — SIGNIFICANT CHANGE UP
MAGNESIUM SERPL-MCNC: 2 MG/DL — SIGNIFICANT CHANGE UP (ref 1.6–2.6)
MANUAL SMEAR VERIFICATION: SIGNIFICANT CHANGE UP
MCHC RBC-ENTMCNC: 29.1 PG — SIGNIFICANT CHANGE UP (ref 27–34)
MCHC RBC-ENTMCNC: 30.5 GM/DL — LOW (ref 32–36)
MCV RBC AUTO: 95.4 FL — SIGNIFICANT CHANGE UP (ref 80–100)
MICROCYTES BLD QL: SLIGHT — SIGNIFICANT CHANGE UP
MONOCYTES # BLD AUTO: 0.29 K/UL — SIGNIFICANT CHANGE UP (ref 0–0.9)
MONOCYTES NFR BLD AUTO: 16.5 % — HIGH (ref 2–14)
NEUTROPHILS # BLD AUTO: 0.94 K/UL — LOW (ref 1.8–7.4)
NEUTROPHILS NFR BLD AUTO: 53.2 % — SIGNIFICANT CHANGE UP (ref 43–77)
NRBC # BLD: 1 /100 — HIGH (ref 0–0)
NRBC # BLD: SIGNIFICANT CHANGE UP /100 WBCS (ref 0–0)
OVALOCYTES BLD QL SMEAR: SLIGHT — SIGNIFICANT CHANGE UP
PHOSPHATE SERPL-MCNC: 3.3 MG/DL — SIGNIFICANT CHANGE UP (ref 2.5–4.5)
PLAT MORPH BLD: ABNORMAL
PLATELET # BLD AUTO: 51 K/UL — LOW (ref 150–400)
POIKILOCYTOSIS BLD QL AUTO: SIGNIFICANT CHANGE UP
POLYCHROMASIA BLD QL SMEAR: SLIGHT — SIGNIFICANT CHANGE UP
POTASSIUM SERPL-MCNC: 4.4 MMOL/L — SIGNIFICANT CHANGE UP (ref 3.5–5.3)
POTASSIUM SERPL-SCNC: 4.4 MMOL/L — SIGNIFICANT CHANGE UP (ref 3.5–5.3)
PROT SERPL-MCNC: 8.6 G/DL — HIGH (ref 6–8.3)
RBC # BLD: 3.23 M/UL — LOW (ref 4.2–5.8)
RBC # FLD: 15.3 % — HIGH (ref 10.3–14.5)
RBC BLD AUTO: ABNORMAL
SCHISTOCYTES BLD QL AUTO: SIGNIFICANT CHANGE UP
SMUDGE CELLS # BLD: PRESENT — SIGNIFICANT CHANGE UP
SODIUM SERPL-SCNC: 139 MMOL/L — SIGNIFICANT CHANGE UP (ref 135–145)
SPHEROCYTES BLD QL SMEAR: SLIGHT — SIGNIFICANT CHANGE UP
VARIANT LYMPHS # BLD: 0.9 % — SIGNIFICANT CHANGE UP (ref 0–6)
WBC # BLD: 1.76 K/UL — LOW (ref 3.8–10.5)
WBC # FLD AUTO: 1.76 K/UL — LOW (ref 3.8–10.5)

## 2021-03-12 PROCEDURE — 99233 SBSQ HOSP IP/OBS HIGH 50: CPT | Mod: GC

## 2021-03-12 PROCEDURE — 95720 EEG PHY/QHP EA INCR W/VEEG: CPT

## 2021-03-12 PROCEDURE — 99233 SBSQ HOSP IP/OBS HIGH 50: CPT

## 2021-03-12 PROCEDURE — 99232 SBSQ HOSP IP/OBS MODERATE 35: CPT

## 2021-03-12 RX ORDER — BENZOCAINE AND MENTHOL 5; 1 G/100ML; G/100ML
1 LIQUID ORAL
Refills: 0 | Status: DISCONTINUED | OUTPATIENT
Start: 2021-03-12 | End: 2021-03-17

## 2021-03-12 RX ORDER — SODIUM CHLORIDE 9 MG/ML
1000 INJECTION, SOLUTION INTRAVENOUS ONCE
Refills: 0 | Status: COMPLETED | OUTPATIENT
Start: 2021-03-12 | End: 2021-03-12

## 2021-03-12 RX ADMIN — Medication 81 MILLIGRAM(S): at 11:23

## 2021-03-12 RX ADMIN — LIDOCAINE 1 PATCH: 4 CREAM TOPICAL at 11:23

## 2021-03-12 RX ADMIN — LIDOCAINE 1 PATCH: 4 CREAM TOPICAL at 00:13

## 2021-03-12 RX ADMIN — BENZOCAINE AND MENTHOL 1 LOZENGE: 5; 1 LIQUID ORAL at 11:23

## 2021-03-12 RX ADMIN — Medication 650 MILLIGRAM(S): at 09:20

## 2021-03-12 RX ADMIN — DIVALPROEX SODIUM 1000 MILLIGRAM(S): 500 TABLET, DELAYED RELEASE ORAL at 21:36

## 2021-03-12 RX ADMIN — ATORVASTATIN CALCIUM 40 MILLIGRAM(S): 80 TABLET, FILM COATED ORAL at 21:36

## 2021-03-12 RX ADMIN — Medication 650 MILLIGRAM(S): at 19:08

## 2021-03-12 RX ADMIN — Medication 100 MILLIGRAM(S): at 06:33

## 2021-03-12 RX ADMIN — Medication 100 MILLIGRAM(S): at 21:36

## 2021-03-12 RX ADMIN — SODIUM CHLORIDE 1000 MILLILITER(S): 9 INJECTION, SOLUTION INTRAVENOUS at 11:33

## 2021-03-12 RX ADMIN — BICTEGRAVIR SODIUM, EMTRICITABINE, AND TENOFOVIR ALAFENAMIDE FUMARATE 1 TABLET(S): 30; 120; 15 TABLET ORAL at 11:23

## 2021-03-12 RX ADMIN — Medication 100 MILLIGRAM(S): at 15:30

## 2021-03-12 RX ADMIN — LIDOCAINE 1 PATCH: 4 CREAM TOPICAL at 18:05

## 2021-03-12 RX ADMIN — CEFTRIAXONE 100 MILLIGRAM(S): 500 INJECTION, POWDER, FOR SOLUTION INTRAMUSCULAR; INTRAVENOUS at 11:22

## 2021-03-12 RX ADMIN — Medication 650 MILLIGRAM(S): at 10:20

## 2021-03-12 NOTE — PROGRESS NOTE ADULT - ASSESSMENT
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  Admitted on 3/9 for 2 unwitnessed reported seizure events. EEG thus far has not shown epileptiform activity.  History obtained from patient indicate uncontrolled seizure events, and would prefer a once daily regimen for seizure medication. Initiated mood stabilizer/epilepsy medication VPA ER 1000mg/d, but had report of an aura on 3/11 despite dual therapy LEV + VPA, and unclear unwitnessed event w/ obvious left lip with bite.     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  Admitted on 3/9 for 2 unwitnessed reported seizure events. EEG thus far has not shown epileptiform activity.  History obtained from patient indicate uncontrolled seizure events, and would prefer a once daily regimen for seizure medication. Initiated mood stabilizer/epilepsy medication VPA ER 1000mg/d, but had report of an aura on 3/11 despite dual therapy LEV + VPA, and unclear unwitnessed event w/ obvious left lip with bite.     Recommendations:  Continue vEEG monitoring  Continue Depakote 1000mg ER QHS, but likely to increase dosing to 1500mg  Please obtain VPA level in AM (29.2 today)  Maintain seizure and fall precautions

## 2021-03-12 NOTE — PROGRESS NOTE ADULT - SUBJECTIVE AND OBJECTIVE BOX
INTERVAL HPI/OVERNIGHT EVENTS:    SUBJECTIVE: Patient seen and examined at bedside.    OBJECTIVE:    VITAL SIGNS:  ICU Vital Signs Last 24 Hrs  T(C): 37.1 (11 Mar 2021 21:48), Max: 37.2 (11 Mar 2021 12:53)  T(F): 98.7 (11 Mar 2021 21:48), Max: 99 (11 Mar 2021 12:53)  HR: 83 (11 Mar 2021 21:48) (82 - 83)  BP: 159/99 (11 Mar 2021 21:48) (132/78 - 159/99)  BP(mean): --  ABP: --  ABP(mean): --  RR: 18 (11 Mar 2021 21:48) (18 - 18)  SpO2: 99% (11 Mar 2021 21:48) (99% - 99%)        CAPILLARY BLOOD GLUCOSE      POCT Blood Glucose.: 120 mg/dL (11 Mar 2021 16:54)      PHYSICAL EXAM:        MEDICATIONS:  MEDICATIONS  (STANDING):  acetaminophen   Tablet .. 650 milliGRAM(s) Oral every 6 hours  amLODIPine   Tablet 5 milliGRAM(s) Oral daily  aspirin  chewable 81 milliGRAM(s) Oral daily  atorvastatin 40 milliGRAM(s) Oral at bedtime  atovaquone  Suspension 1500 milliGRAM(s) Oral daily  bictegravir 50 mG/emtricitabine 200 mG/tenofovir alafenamide 25 mG (BIKTARVY) 1 Tablet(s) Oral daily  cefTRIAXone   IVPB 1000 milliGRAM(s) IV Intermittent every 24 hours  dextrose 40% Gel 15 Gram(s) Oral once  dextrose 5%. 1000 milliLiter(s) (50 mL/Hr) IV Continuous <Continuous>  dextrose 5%. 1000 milliLiter(s) (100 mL/Hr) IV Continuous <Continuous>  dextrose 50% Injectable 25 Gram(s) IV Push once  dextrose 50% Injectable 12.5 Gram(s) IV Push once  dextrose 50% Injectable 25 Gram(s) IV Push once  diVALproex ER 1000 milliGRAM(s) Oral at bedtime  glucagon  Injectable 1 milliGRAM(s) IntraMuscular once  insulin lispro (ADMELOG) corrective regimen sliding scale   SubCutaneous Before meals and at bedtime  lidocaine   Patch 1 Patch Transdermal daily  metroNIDAZOLE  IVPB 500 milliGRAM(s) IV Intermittent every 8 hours  pantoprazole    Tablet 40 milliGRAM(s) Oral before breakfast    MEDICATIONS  (PRN):  LORazepam   Injectable 2 milliGRAM(s) IV Push once PRN Seizure activity      ALLERGIES:  Allergies    No Known Allergies    Intolerances        LABS:                        8.9    1.57  )-----------( 45       ( 11 Mar 2021 15:21 )             29.6     03-11    138  |  109<H>  |  23  ----------------------------<  101<H>  4.7   |  20<L>  |  1.70<H>    Ca    8.4      11 Mar 2021 15:21  Phos  3.8     03-11  Mg     1.9     03-11    TPro  8.4<H>  /  Alb  2.6<L>  /  TBili  0.3  /  DBili  x   /  AST  19  /  ALT  9<L>  /  AlkPhos  35<L>  03-11          RADIOLOGY & ADDITIONAL TESTS: Reviewed. INTERVAL HPI/OVERNIGHT EVENTS:  Pt. refused FSGs overnight.     SUBJECTIVE: Patient seen and examined at bedside. He is reporting 9/10 chest pain- as if someone has punched him- in the sternum, nonradiating, localized, reproducible on palpation, no diaphoresis, radiation to arms. Reports stomach pain- 10/10, and a minor headache. Minimal SOB. Notes he had 2 black stools - overnight RN denies. Denies nausea, vomiting, diarrhea, constipation, urinary changes.    OBJECTIVE:    VITAL SIGNS:  ICU Vital Signs Last 24 Hrs  T(C): 37.1 (11 Mar 2021 21:48), Max: 37.2 (11 Mar 2021 12:53)  T(F): 98.7 (11 Mar 2021 21:48), Max: 99 (11 Mar 2021 12:53)  HR: 83 (11 Mar 2021 21:48) (82 - 83)  BP: 159/99 (11 Mar 2021 21:48) (132/78 - 159/99)  BP(mean): --  ABP: --  ABP(mean): --  RR: 18 (11 Mar 2021 21:48) (18 - 18)  SpO2: 99% (11 Mar 2021 21:48) (99% - 99%)        CAPILLARY BLOOD GLUCOSE      POCT Blood Glucose.: 120 mg/dL (11 Mar 2021 16:54)      PHYSICAL EXAM:  Constitutional: A&OX2, vEEG leads on   Head: NC/AT  Eyes: PERRL, EOMI, anicteric sclera  ENT: MMM  Neck: supple; no JVD or thyromegaly  Respiratory: CTA B/L; no W/R/R   Cardiac: +S1/S2; RRR; no M/R/G, sternal chest pain reproducible on palpation  Gastrointestinal: abdomen tender to palpation throughout, especially in the RLQ, ND; no rebound or guarding  Extremities: WWP, no clubbing or cyanosis; no peripheral edema  Musculoskeletal:  resting tremor in bilateral hands, 1-2/5 strength in RUE, 1-2/5 strength in RLE , 5/5 in LUE/LLE  Neurologic: AAOx3; CNII-XII grossly intact.    MEDICATIONS:  MEDICATIONS  (STANDING):  acetaminophen   Tablet .. 650 milliGRAM(s) Oral every 6 hours  amLODIPine   Tablet 5 milliGRAM(s) Oral daily  aspirin  chewable 81 milliGRAM(s) Oral daily  atorvastatin 40 milliGRAM(s) Oral at bedtime  atovaquone  Suspension 1500 milliGRAM(s) Oral daily  bictegravir 50 mG/emtricitabine 200 mG/tenofovir alafenamide 25 mG (BIKTARVY) 1 Tablet(s) Oral daily  cefTRIAXone   IVPB 1000 milliGRAM(s) IV Intermittent every 24 hours  dextrose 40% Gel 15 Gram(s) Oral once  dextrose 5%. 1000 milliLiter(s) (50 mL/Hr) IV Continuous <Continuous>  dextrose 5%. 1000 milliLiter(s) (100 mL/Hr) IV Continuous <Continuous>  dextrose 50% Injectable 25 Gram(s) IV Push once  dextrose 50% Injectable 12.5 Gram(s) IV Push once  dextrose 50% Injectable 25 Gram(s) IV Push once  diVALproex ER 1000 milliGRAM(s) Oral at bedtime  glucagon  Injectable 1 milliGRAM(s) IntraMuscular once  insulin lispro (ADMELOG) corrective regimen sliding scale   SubCutaneous Before meals and at bedtime  lidocaine   Patch 1 Patch Transdermal daily  metroNIDAZOLE  IVPB 500 milliGRAM(s) IV Intermittent every 8 hours  pantoprazole    Tablet 40 milliGRAM(s) Oral before breakfast    MEDICATIONS  (PRN):  LORazepam   Injectable 2 milliGRAM(s) IV Push once PRN Seizure activity      ALLERGIES:  Allergies    No Known Allergies    Intolerances        LABS:                        8.9    1.57  )-----------( 45       ( 11 Mar 2021 15:21 )             29.6     03-11    138  |  109<H>  |  23  ----------------------------<  101<H>  4.7   |  20<L>  |  1.70<H>    Ca    8.4      11 Mar 2021 15:21  Phos  3.8     03-11  Mg     1.9     03-11    TPro  8.4<H>  /  Alb  2.6<L>  /  TBili  0.3  /  DBili  x   /  AST  19  /  ALT  9<L>  /  AlkPhos  35<L>  03-11          RADIOLOGY & ADDITIONAL TESTS: Reviewed.

## 2021-03-12 NOTE — CHART NOTE - NSCHARTNOTEFT_GEN_A_CORE
O/N Events: Hypotensive this AM.  On EEG.    Subjective/ROS: Patient reports poor adherence to ARVs and anti-epileptics but does name and provider and location.  Reports being seen recently.  He is complaining of weakness (chronically on the R due to CVA).  He has some mild tremor in his left hand.  Denies HA, CP, SOB, n/v.    VITALS  Vital Signs Last 24 Hrs  T(F): 98.2 (12 Mar 2021 09:10), Max: 99 (11 Mar 2021 12:53)  HR: 86 (12 Mar 2021 09:10) (82 - 86)  BP: 91/62 (12 Mar 2021 09:10) (91/62 - 159/99)  RR: 18 (12 Mar 2021 09:10) (18 - 18)  SpO2: 99% (12 Mar 2021 09:10) (99% - 99%)    CAPILLARY BLOOD GLUCOSE  POCT Blood Glucose.: 120 mg/dL (12 Mar 2021 11:38)  POCT Blood Glucose.: 120 mg/dL (11 Mar 2021 16:54)    PHYSICAL EXAM  General: A&Ox3; NAD; cachectic  Head: NC/AT; MMM; PERRL; EOMI; no thrush  Neck: Supple; no JVD, no LAD noted  Respiratory: CTA B/L; no wheezes/crackles   Cardiovascular: Regular rhythm/rate; S1/S2   Gastrointestinal: Soft; tender to palpation; normoactive BS  Extremities: WWP; no edema/cyanosis;   Neurological:  CNII-XII grossly intact; tremor at rest in hands, decreased strength on R side    MEDICATIONS  (STANDING):  acetaminophen   Tablet .. 650 milliGRAM(s) Oral every 6 hours  amLODIPine   Tablet 5 milliGRAM(s) Oral daily  aspirin  chewable 81 milliGRAM(s) Oral daily  atorvastatin 40 milliGRAM(s) Oral at bedtime  atovaquone  Suspension 1500 milliGRAM(s) Oral daily  bictegravir 50 mG/emtricitabine 200 mG/tenofovir alafenamide 25 mG (BIKTARVY) 1 Tablet(s) Oral daily  cefTRIAXone   IVPB 1000 milliGRAM(s) IV Intermittent every 24 hours  dextrose 40% Gel 15 Gram(s) Oral once  dextrose 5%. 1000 milliLiter(s) (50 mL/Hr) IV Continuous <Continuous>  dextrose 5%. 1000 milliLiter(s) (100 mL/Hr) IV Continuous <Continuous>  dextrose 50% Injectable 25 Gram(s) IV Push once  dextrose 50% Injectable 12.5 Gram(s) IV Push once  dextrose 50% Injectable 25 Gram(s) IV Push once  diVALproex ER 1000 milliGRAM(s) Oral at bedtime  glucagon  Injectable 1 milliGRAM(s) IntraMuscular once  insulin lispro (ADMELOG) corrective regimen sliding scale   SubCutaneous Before meals and at bedtime  lidocaine   Patch 1 Patch Transdermal daily  metroNIDAZOLE  IVPB 500 milliGRAM(s) IV Intermittent every 8 hours  pantoprazole    Tablet 40 milliGRAM(s) Oral before breakfast    MEDICATIONS  (PRN):  benzocaine 15 mG/menthol 3.6 mG (Sugar-Free) Lozenge 1 Lozenge Oral four times a day PRN Sore Throat  LORazepam   Injectable 2 milliGRAM(s) IV Push once PRN Seizure activity      No Known Allergies      LABS                        8.9    1.57  )-----------( 45       ( 11 Mar 2021 15:21 )             29.6     03-11    138  |  109<H>  |  23  ----------------------------<  101<H>  4.7   |  20<L>  |  1.70<H>    Ca    8.4      11 Mar 2021 15:21  Phos  3.8     03-11  Mg     1.9     03-11    TPro  8.4<H>  /  Alb  2.6<L>  /  TBili  0.3  /  DBili  x   /  AST  19  /  ALT  9<L>  /  AlkPhos  35<L>  03-11    IMAGING/EKG/ETC: Reviewed    65M w/ AIDS presents w/ reported seizure and possible diverticulitis.      AIDS: Med rec reports previous rx of Biktarvy, now restarted in hospital.  VL 32k, CD4 in 30s.  CD4 likely acutely suppressed from acute illness.  Low concern for OI.  Has hx of epilepsy since childhood, so seizure is unlikely due to OI (crypt/toxo/CNS lymphoma).  Will send off general crypt Ag for safety.  Will need genotyping in the future, but as outpatient.  Cr increased today -- CrCl 45.  C/w biktarvy and atovaquone.  HIV team will continue to follow.

## 2021-03-12 NOTE — PHYSICAL THERAPY INITIAL EVALUATION ADULT - PERTINENT HX OF CURRENT PROBLEM, REHAB EVAL
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 Patient reports he was supposed to go to drug rehabilitation for substance abuse today, however he reports he had a seizure today. Reports incontinence, loss of consciousness reports he does not know how long symptoms went on for. Reports he did not fall or hit his head during seizure event.

## 2021-03-12 NOTE — OCCUPATIONAL THERAPY INITIAL EVALUATION ADULT - LEVEL OF INDEPENDENCE: SIT/SUPINE, REHAB EVAL
moderate assist (50% patients effort)/maximum assist (25% patients effort) minimum assist (75% patients effort)

## 2021-03-12 NOTE — CONSULT NOTE ADULT - ASSESSMENT
per Internal Medicine    66 yo M lives in shelter [facility] HIV (unclear if patient takes medications), EtOH abuse last about 2 weeks ago), substance abuse of cocaine and cannabis), HTN, MI in past, CVA x2 (residual right sided weakness), epilepsy presents after reported seizure, with abdominal pain, being treated for diverticulitis on IV abx. Epilepsy/HIV (Dr. Ayala) consulted.     Problem/Plan - 1:  ·  Problem: Abdominal pain.  Plan: -Pt. reporting chronic abdominal pain - 8-9/10 in severity, abdomen tender to palpation throughout, especially in the RLQ, ND; no rebound or guarding  -CT A/P- Sigmoid diverticulosis with mild mural thickening, differential includes chronic mural hypertrophy or mild colitis/diverticulitis in the appropriate clinical setting. Severe aortobiiliac calcific atherosclerosis. Focal fusiform aortic aneurysm above celiac axis 3.6 cm.  -Abd XR- No abnormal bowel dilatation or pneumoperitoneum. Lumbar spine degenerative changes, dextroscoliosis. Abdominal and pelvic vascular calcification.. Heart size within normal limits, thoracic aortic calcification. Lungs and mediastinum are unremarkable. Stable bony structures. Elevation of the left hemidiaphragm.  -Concern for possible diverticulitis- started on ceftriaxone 1 g IV qd, flagyl 500 mg IV q8h  -tylenol 650 mg q6h standing for pain  -monitor for bloody BMs  -med rec obtained from [facility]- [address] [place] [address]  -Attempted to contact PCP- Dr. Jeff Corral- [facility]   [phone]  -PCP office will fax records to [facility] 3/11.     Problem/Plan - 2:  ·  Problem: Seizures.  Plan: Patient with a longstanding history of seizures, is supposed to take Keppra and reports he takes 'two two times a day', however reports he has not been taking his medications for the past 2 weeks. Given 1000 mg Keppra in the ED.   - vEEG ordered  - Keppra level as add on lab  - Started on keppra 500 mg PO BID- D/juan j keppra on 3/10-> switched to depakote 1000 mg ER qhs and ordered for depakote level.  -Pt. 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  -EKG- NSR  -trop negative.     Problem/Plan - 3:  ·  Problem: HIV (human immunodeficiency virus infection).  Plan: Patient with a history of known HIV, reports he takes no medications at home for HIV.  - f/u T cell count, viral load count -CD4 count 37, viral load 32k this admission  - HIV consulted-. Started on biktarvy and atovaquone (takes bactrim outpt. but has VINAY vs CKD of Scr 1.5) as per Dr. Ayala, HIV doc. Attempted to call PCP.     Problem/Plan - 4:  ·  Problem: Pancytopenia.  Plan: Patient with thrombopenia, leukopenia, anemia- pancytopenic. Possible causes of thrombocytopenia include EtOH abuse vs HIV infection.   - Trend CBC  -no signs of bleeding carol.     Problem/Plan - 5:  ·  Problem: VINAY (acute kidney injury).  Plan: unclear if VINAY vs CKD (pt w/ hx. of HTN, and nonadherence to medications)  -Scr of 1.5 on admission  -trend CMP  -avoid nephrotoxic medications.     Problem/Plan - 6:  Problem: Diabetes type 2, uncontrolled. Plan: Patient with a history of DM type 2, reports he takes no medications for this at home.  - HgA1c 5.4- well controlled   - mISS for now, no need to start nutritional or basal insulin yet.    Problem/Plan - 7:  ·  Problem: Essential hypertension.  Plan: Patient with a history of hypertension. Reports he takes 'a blood pressure pill' at home 'sometimes'. Does not check ambulatory BPs. Patient pressure on admission is hypertensive to 162/102.  - Start amlodipine 5 mg PO Qd for now- increase as needed.     Problem/Plan - 8:  ·  Problem: Polysubstance abuse.  Plan: Patient reports he abuses crack cocaine and marijuana. As per patient he was planning to go to drug rehabilitation center today, however had seizure prior to presentation to clinic. UTox positive for cocaine metabolites and cannabinoid metabolites.  - Continue to monitor, NTD at this time.     Problem/Plan - 9:  ·  Problem: Need for prophylactic measure.  Plan: DVT ppx - Lovenox  GI ppx - Pantoprazole 40 mg PO Qd.     Problem/Plan - 10:  Problem: Nutrition, metabolism, and development symptoms. Plan; F - none  E - replete PRN  N - diabetic dash/tlc  dvt ppx- holding in setting of thrombocytopenia   A - RMF.

## 2021-03-12 NOTE — OCCUPATIONAL THERAPY INITIAL EVALUATION ADULT - DIAGNOSIS, OT EVAL
Pt presents with decreased weakness in the RUE and RLE, impaired coordination, decreased strength, impaired cognition, and impaired vision impacting ADL's and functional mobility

## 2021-03-12 NOTE — PROGRESS NOTE ADULT - PROBLEM SELECTOR PLAN 5
unclear if VINAY vs CKD (pt w/ hx. of HTN, and nonadherence to medications)  -Scr of 1.5 on admission  -trend CMP  -avoid nephrotoxic medications unclear if VINAY vs CKD (pt w/ hx. of HTN, and nonadherence to medications)  -Scr of 1.5 on admission, now increased to Scr 1.70 on 3/11  -f/u urine sodium, urine creatinine  -trend CMP  -avoid nephrotoxic medications

## 2021-03-12 NOTE — OCCUPATIONAL THERAPY INITIAL EVALUATION ADULT - MANUAL MUSCLE TESTING RESULTS, REHAB EVAL
RUE shoulder 2/5, R elbow 3/5, R forearm 3/5, LUE 3+/5 esvin, RLE 2/5, LLE 3/5 RUE shoulder 2/5, R elbow 3/5, R forearm 3/5, LUE 3+/5 franciscoout, RLE 2+/5, LLE 3/5

## 2021-03-12 NOTE — OCCUPATIONAL THERAPY INITIAL EVALUATION ADULT - LEVEL OF INDEPENDENCE: STAND/SIT, REHAB EVAL
Person requires 2 person assist/unable to perform Required 2 person assist to stand/unable to perform

## 2021-03-12 NOTE — EEG REPORT - NS EEG TEXT BOX
Day 1: 3/12/2021 @ 4:50:55 AM to next morning @ 07:00 am  Background:  continuous, with predominantly alpha and beta frequencies.  Symmetry:  No persistent asymmetries of voltage or frequency.  Posterior Dominant Rhythm:  8 Hz symmetric, well-organized, and well-modulated.  Organization: Rudimentary.  Voltage:  Normal (20+ uV)  Variability: Yes. 		Reactivity: Yes.  N2 sleep: Symmetric, synchronous spindles and K complexes.  Spontaneous Activity:  No epileptiform discharges.  Periodic/rhythmic activity:  None.  Events:  No electrographic seizures or significant clinical events.  Provocations:  Hyperventilation and Photic stimulation: was not performed.    Daily Summary:    Mild generalized  slowing suggestive of a similar degree of diffuse or multifocal  dysfunction.  No epileptiform activity and no significant clinical events occurred.

## 2021-03-12 NOTE — PROGRESS NOTE ADULT - ASSESSMENT
65M lives in shelter PMH HIV (unclear if patient takes medications), EtOH abuse last about 2 weeks ago), substance abuse of cocaine and cannabis), HTN, MI in past, CVA x2 (residual right sided weakness), epilepsy presents after reported seizure, with abdominal pain, being treated for diverticulitis on IV abx. Epilepsy/HIV (Dr. Ayala) consulted.

## 2021-03-12 NOTE — PROGRESS NOTE ADULT - PROBLEM SELECTOR PLAN 2
Patient with a longstanding history of seizures, is supposed to take Keppra and reports he takes 'two two times a day', however reports he has not been taking his medications for the past 2 weeks. Given 1000 mg Keppra in the ED.   - vEEG ordered  - Keppra level as add on lab  - Started on keppra 500 mg PO BID- D/juan j keppra on 3/10-> switched to depakote 1000 mg ER qhs and ordered for depakote level.  -Pt. 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  -EKG- NSR  -trop negative Patient with a longstanding history of seizures, is supposed to take Keppra and reports he takes 'two two times a day', however reports he has not been taking his medications for the past 2 weeks. Given 1000 mg Keppra in the ED.   - vEEG ordered  - Keppra level as add on lab  - Started on keppra 500 mg PO BID- D/juan j keppra on 3/10-> switched to depakote 1000 mg ER qhs and ordered for depakote level.  -Pt. 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  -EKG- NSR  -trop negative

## 2021-03-12 NOTE — OCCUPATIONAL THERAPY INITIAL EVALUATION ADULT - ADDITIONAL COMMENTS
Pt reports that he lived in "[facility], a housing facility for disabled". Pt reports living alone.

## 2021-03-12 NOTE — PROGRESS NOTE ADULT - PROBLEM SELECTOR PROBLEM 2
[facility], [facility] Progress Note - Hospitalist Service Gold 09       Date of Admission:  5/15/2020  Assessment & Plan   Trena Madison is a 54 year old female with PMH of metastatic cervical cancer and colon cancer, CAD, HFrEF, recent FTT, malnutrition, oncologic pain and functional decline admitted on 5/15/2020. She has nausea and presents for further hospice and palliative care needs.      Oncologic pain syndrome  Metastatic cervical cancer  Metastatic ascending adenocarcinoma of the colon  Has had at least one round of FOLFORI chemotherapy. Follows with Dr Fabian. CT C/A/P with IV contrast 5/8/20: extensive liver mets, extensive RP adenopathy with mass effection on left renal vein, infrarenal IVC and right psoas, necrotic left adrenal lesion, right  Basilar pulmonary nodule, sclerotic changes in T12, L1, right lower quadrant mesenteric mass.   Pt reports pain in mid lower back over spine ~L1 level.   - Patient presented with increasing pain and poor appetite   - needed higher dose of   - Pain regiment: IV dilaudid 1 mg Q 3 hrs and concentrated dilaudid    - continue PTA prn ativan  -narcan  - Palliative care consult called patient not responding to chemo:    -continue PTA decadron: for spinal mets     Nausea and vomiting  Generalized muscle weakness  Admission labs w/ albumin stably low at 2.6, 5/10 phos 2.7. CT head w/o contrast 5/15/20 negative for acute intracranial pathology. Exam negative for focal deficits and c/w chronically ill and cachetic appearance. Pt unable to keep oral antiemetics down at home  -D5, 1/2 NS @125cc/h  -antiemetics: increased zofran , prochlorperazine  -bowel regiment: prn miralax, dulcolax (po and supp)    FTT with likely severe malnutrition  Hypoalbuminemia  RD assessed on prior admission and unable to assess malnutrition status, suspect severe malnutition. 30 lb wt loss/2-3 months.   -Pt is at refeeding risk per prior RD input, monitor  phos   -phos replacement protocol  -ADAT to regular diet  -nutrition supplement shakes     Na in , BL ~137, clinically hypo-euvolemic. Suspect is secondary to poor nutrtion, and GI loss d/t N/V, combined with CKD.   -D5NS @125cc/h  -trend BMP     Leukocytosis  WBC in ED is 17/7. CXR without airspace disease or apparent new pathology. Lactate 2.0. Blood cx on 5/8/20 x 2 are NGTD, 5/7/20 urine cx c/w urogenital kleber. Per prior heme/onc note, leukocytosis potentially secondary to malignancy. Prior WBC 9-11. No clear signs of infection on exam  -follow up UA and culture  -low threshold to start antibiotics if fever/SIRs      Transaminitis  Hepatic steatosis  On admission , ALT 53, , T-bili 0.4, recent prior LFTs normal. Ammonia <10. Hepatic steatosis noted on 4/19/19 PET CT, occurs in the setting of liver mets.  -limit APAP dosing  -limit hepatotoxic agents  -trend CMP     Hypoglycemia  BG in 50s on 5/10/20, no diabetes/diabetic medications. Occurs in the setting of poor po. BG in ED in 60s.   -hypoglycemia protocol ordered  -D5,1/2NS @125cc/h  -advance diet as tolerated     Hypophosphatemia   - replacement ordered     Mild anemia  hgb 11.3 on admission, slightly above prior, c/w hemoconcentration  -trend CBC     CAD, history of MI  Boarderline HFrEF  6 stents. EKG on 5/15/20 sinus, VR ~60, stable appearance vs prior on 5/7/20. Echo 2/24/20 stable v prior on 8/2019 with LVEF 53%, no WMA, no gross abnormality noted. PET CT 4/19/19 notes mild to moderate CAD.   - continue PTA metoprolol with parameters  - continue PTA plavix         Diet: Advance Diet as Tolerated: Regular Diet Adult  Snacks/Supplements Pediatric: Boost Plus; With Meals    DVT Prophylaxis: Enoxaparin (Lovenox) SQ  Bailey Catheter: not present  Code Status: Full Code           Disposition Plan   Expected discharge: 2 - 3 days, recommended to prior living arrangement once adequate pain management/ tolerating PO medications.  Entered:  Erlin Mcdonnell MD 05/17/2020, 11:22 AM       The patient's care was discussed with the Bedside Nurse and Patient.    Erlin Mcdonnell MD  Hospitalist Service, Gold 09  [facility], [place]  Pager: [phone]  Please see sticky note for cross cover information  ______________________________________________________________________    Interval History   Pain better controlled but she continue to have nausea and poor appetite    Data reviewed today: I reviewed all medications, new labs and imaging results over the last 24 hours.       Physical Exam   Vital Signs: Temp: 99.7  F (37.6  C) Temp src: Oral BP: 115/54 Pulse: 93 Heart Rate: 86 Resp: 18 SpO2: 98 % O2 Device: None (Room air)    Weight: 125 lbs 1.6 oz    GENERAL: Cachexic, Alert and oriented. NAD. Able to roll to side independently with holding railing. Cooperative.   HEENT: Anicteric sclera. Mucous membranes moist. NC. AT. EOMI. PERRLA  CV: RRR. S1, S2. No murmurs appreciated.   RESPIRATORY: Effort normal on RA. Lungs CTAB with no wheezing, rales, rhonchi.   GI: Abdomen soft and non distended with normoactive bowel sounds present in all quadrants. No tenderness, rebound, guarding. No lesions.   NEUROLOGICAL: No focal deficits. Moves all extremities.  CN 2-12 grossly intact.  EXTREMITIES: No peripheral edema. Intact bilateral pedal pulses.   SKIN: No jaundice. No rashes.  BACK: + L1 spinous tenderness, no deformity, no lesions, no CVA tenderness  Data   Recent Labs   Lab 05/17/20  0558 05/16/20  0733 05/15/20  1221   WBC  --  12.8* 17.7*   HGB  --  10.5* 11.3*   MCV  --  93 92   PLT  --  270 407   INR  --   --  1.09    137 132*   POTASSIUM 3.8 3.7 4.6   CHLORIDE 111* 106 97   CO2 19* 23 28   BUN 7 14 22   CR 0.53 0.57 0.67   ANIONGAP 9 8 6   GERSON 9.1 9.8 10.7*   * 88 71   ALBUMIN 1.9* 2.3* 2.6*   PROTTOTAL 5.6* 6.1* 6.9   BILITOTAL 0.4 0.7 0.4   ALKPHOS 417* 552* 495*   ALT 72* 60* 52*   * 153* 112*   LIPASE  --   --  95      No results found for this or any previous visit (from the past 24 hour(s)).  Medications     dextrose 5% and 0.9% NaCl 125 mL/hr at 05/17/20 0800       dexamethasone  4 mg Oral BID w/meals     docusate sodium  100 mg Oral BID     enoxaparin ANTICOAGULANT  40 mg Subcutaneous Q24H     metoprolol succinate ER  100 mg Oral Daily     OLANZapine zydis  5 mg Oral Daily     ondansetron  8 mg Sublingual TID     sodium chloride (PF)  3 mL Intracatheter Q8H        Seizures

## 2021-03-12 NOTE — PROGRESS NOTE ADULT - PROBLEM SELECTOR PLAN 1
-Pt. reporting chronic abdominal pain - 8-9/10 in severity, abdomen tender to palpation throughout, especially in the RLQ, ND; no rebound or guarding  -CT A/P- Sigmoid diverticulosis with mild mural thickening, differential includes chronic mural hypertrophy or mild colitis/diverticulitis in the appropriate clinical setting. Severe aortobiiliac calcific atherosclerosis. Focal fusiform aortic aneurysm above celiac axis 3.6 cm.  -Abd XR- No abnormal bowel dilatation or pneumoperitoneum. Lumbar spine degenerative changes, dextroscoliosis. Abdominal and pelvic vascular calcification.. Heart size within normal limits, thoracic aortic calcification. Lungs and mediastinum are unremarkable. Stable bony structures. Elevation of the left hemidiaphragm.  -Concern for possible diverticulitis- started on ceftriaxone 1 g IV qd, flagyl 500 mg IV q8h  -tylenol 650 mg q6h standing for pain  -monitor for bloody BMs  -med rec obtained from [facility]  -Attempted to contact PCP- Dr. Jeff Corral- [facility]   [phone]  -PCP office will fax records to [facility] 3/11

## 2021-03-12 NOTE — PROGRESS NOTE ADULT - SUBJECTIVE AND OBJECTIVE BOX
EPILEPSY PROGRESS NOTE:  Subjective:  Patient w/ Left lip with blood with no recollection of how it occurred. He state that he did have his typical aura in the morning w/ the smell of something burning.     REVIEW OF SYSTEMS:  CONSTITUTIONAL:  No weight loss, fever, chills, weakness or fatigue.  HEENT:  Eyes:  No visual loss, blurred vision, double vision or yellow sclerae. Ears, Nose, Throat:  No hearing loss, sneezing, congestion, runny nose or sore throat.  SKIN:  No rash or itching.  CARDIOVASCULAR:  No chest pain, chest pressure or chest discomfort. No palpitations or edema.  RESPIRATORY:  No shortness of breath, cough or sputum.  GASTROINTESTINAL:  No anorexia, nausea, vomiting or diarrhea. No abdominal pain or blood.  GENITOURINARY: No Burning on urination.   NEUROLOGICAL:  see HPI  MUSCULOSKELETAL:  No muscle, back pain, joint pain or stiffness.  HEMATOLOGIC:  No anemia, bleeding or bruising.  LYMPHATICS:  No enlarged nodes. No history of splenectomy.  PSYCHIATRIC:  No history of depression or anxiety.  ENDOCRINOLOGIC:  No reports of sweating, cold or heat intolerance. No polyuria or polydipsia.  ALLERGIES:  No history of asthma, hives, eczema or rhinitis.    MEDICATIONS  (STANDING):  acetaminophen   Tablet .. 650 milliGRAM(s) Oral every 6 hours  amLODIPine   Tablet 5 milliGRAM(s) Oral daily  aspirin  chewable 81 milliGRAM(s) Oral daily  atorvastatin 40 milliGRAM(s) Oral at bedtime  atovaquone  Suspension 1500 milliGRAM(s) Oral daily  bictegravir 50 mG/emtricitabine 200 mG/tenofovir alafenamide 25 mG (BIKTARVY) 1 Tablet(s) Oral daily  cefTRIAXone   IVPB 1000 milliGRAM(s) IV Intermittent every 24 hours  dextrose 40% Gel 15 Gram(s) Oral once  dextrose 5%. 1000 milliLiter(s) (50 mL/Hr) IV Continuous <Continuous>  dextrose 5%. 1000 milliLiter(s) (100 mL/Hr) IV Continuous <Continuous>  dextrose 50% Injectable 25 Gram(s) IV Push once  dextrose 50% Injectable 12.5 Gram(s) IV Push once  dextrose 50% Injectable 25 Gram(s) IV Push once  diVALproex ER 1000 milliGRAM(s) Oral at bedtime  glucagon  Injectable 1 milliGRAM(s) IntraMuscular once  insulin lispro (ADMELOG) corrective regimen sliding scale   SubCutaneous Before meals and at bedtime  lidocaine   Patch 1 Patch Transdermal daily  metroNIDAZOLE  IVPB 500 milliGRAM(s) IV Intermittent every 8 hours  pantoprazole    Tablet 40 milliGRAM(s) Oral before breakfast    MEDICATIONS  (PRN):  benzocaine 15 mG/menthol 3.6 mG (Sugar-Free) Lozenge 1 Lozenge Oral four times a day PRN Sore Throat  LORazepam   Injectable 2 milliGRAM(s) IV Push once PRN Seizure activity    VITAL SIGNS:  T(C): 36.8 (03-12-21 @ 09:10), Max: 37.1 (03-11-21 @ 21:48)  HR: 86 (03-12-21 @ 09:10) (83 - 86)  BP: 137/89 (03-12-21 @ 12:35) (91/62 - 159/99)  RR: 18 (03-12-21 @ 09:10) (18 - 18)  SpO2: 99% (03-12-21 @ 09:10) (99% - 99%)  Wt(kg): --    PHYSICAL EXAM:  Psychiatric: calm    Cranial Nerves:  II: III/IV/VI: PERRLA 3mm brisk EOMF No nystagmus  V1V2V3: Symmetric, VII: Face appears symmetric VIII: Normal to screening, IX/X: Palate Elevates Symmetrical  XI: Trapezius Symmetric  Motor:  Coordination/Gait:  Finger-nose-finger some difficulty w/ LUE, and LLE  Gait deferred     LABS:  CBC Full  -  ( 12 Mar 2021 12:38 )  WBC Count : 1.76 K/uL  RBC Count : 3.23 M/uL  Hemoglobin : 9.4 g/dL  Hematocrit : 30.8 %  Platelet Count - Automated : 51 K/uL  Mean Cell Volume : 95.4 fl  Mean Cell Hemoglobin : 29.1 pg  Mean Cell Hemoglobin Concentration : 30.5 gm/dL  Auto Neutrophil # : 0.94 K/uL  Auto Lymphocyte # : 0.37 K/uL  Auto Monocyte # : 0.29 K/uL  Auto Eosinophil # : 0.13 K/uL  Auto Basophil # : 0.02 K/uL  Auto Neutrophil % : 53.2 %  Auto Lymphocyte % : 21.1 %  Auto Monocyte % : 16.5 %  Auto Eosinophil % : 7.4 %  Auto Basophil % : 0.9 %    03-12    139  |  106  |  23  ----------------------------<  90  4.4   |  23  |  1.56<H>    Ca    8.5      12 Mar 2021 12:39  Phos  3.3     03-12  Mg     2.0     03-12    TPro  8.6<H>  /  Alb  3.0<L>  /  TBili  0.3  /  DBili  x   /  AST  19  /  ALT  9<L>  /  AlkPhos  37<L>  03-12    LIVER FUNCTIONS - ( 12 Mar 2021 12:39 )  Alb: 3.0 g/dL / Pro: 8.6 g/dL / ALK PHOS: 37 U/L / ALT: 9 U/L / AST: 19 U/L / GGT: x           EEG Daily Summary 3/12/21:    Mild generalized  slowing suggestive of a similar degree of diffuse or multifocal  dysfunction. No epileptiform activity and no significant clinical events occurred.    Electronic Signatures:  Magy Bhatti)  (Signed 12-Mar-2021 13:38)  	Authored: EEG REPORT  Estuardo Casey)  (Signature Pending)  	Co-Signer: EEG REPORT EPILEPSY PROGRESS NOTE:  Subjective:  Patient w/ Left lip with blood with no recollection of how it occurred. He state that he did have his typical aura in the morning w/ the smell of something burning.   He reported later that he may have had 2 seizures in the night, but occurring prior to the EEG being placed back on his head.  He acknowledged that he had not wanted it on earlier, but now he is happy to have it on until we decide.    REVIEW OF SYSTEMS:  CONSTITUTIONAL:  No weight loss, fever, chills, weakness or fatigue.  HEENT:  Eyes:  No visual loss, blurred vision, double vision or yellow sclerae. Ears, Nose, Throat:  No hearing loss, sneezing, congestion, runny nose or sore throat.  SKIN:  No rash or itching.  CARDIOVASCULAR:  No chest pain, chest pressure or chest discomfort. No palpitations or edema.  RESPIRATORY:  No shortness of breath, cough or sputum.  GASTROINTESTINAL:  No anorexia, nausea, vomiting or diarrhea. No abdominal pain or blood.  GENITOURINARY: No Burning on urination.   NEUROLOGICAL:  see HPI  MUSCULOSKELETAL:  No muscle, back pain, joint pain or stiffness.  HEMATOLOGIC:  No anemia, bleeding or bruising.  LYMPHATICS:  No enlarged nodes. No history of splenectomy.  PSYCHIATRIC:  No history of depression or anxiety.  ENDOCRINOLOGIC:  No reports of sweating, cold or heat intolerance. No polyuria or polydipsia.  ALLERGIES:  No history of asthma, hives, eczema or rhinitis.    MEDICATIONS  (STANDING):  acetaminophen   Tablet .. 650 milliGRAM(s) Oral every 6 hours  amLODIPine   Tablet 5 milliGRAM(s) Oral daily  aspirin  chewable 81 milliGRAM(s) Oral daily  atorvastatin 40 milliGRAM(s) Oral at bedtime  atovaquone  Suspension 1500 milliGRAM(s) Oral daily  bictegravir 50 mG/emtricitabine 200 mG/tenofovir alafenamide 25 mG (BIKTARVY) 1 Tablet(s) Oral daily  cefTRIAXone   IVPB 1000 milliGRAM(s) IV Intermittent every 24 hours  dextrose 40% Gel 15 Gram(s) Oral once  dextrose 5%. 1000 milliLiter(s) (50 mL/Hr) IV Continuous <Continuous>  dextrose 5%. 1000 milliLiter(s) (100 mL/Hr) IV Continuous <Continuous>  dextrose 50% Injectable 25 Gram(s) IV Push once  dextrose 50% Injectable 12.5 Gram(s) IV Push once  dextrose 50% Injectable 25 Gram(s) IV Push once  diVALproex ER 1000 milliGRAM(s) Oral at bedtime  glucagon  Injectable 1 milliGRAM(s) IntraMuscular once  insulin lispro (ADMELOG) corrective regimen sliding scale   SubCutaneous Before meals and at bedtime  lidocaine   Patch 1 Patch Transdermal daily  metroNIDAZOLE  IVPB 500 milliGRAM(s) IV Intermittent every 8 hours  pantoprazole    Tablet 40 milliGRAM(s) Oral before breakfast    MEDICATIONS  (PRN):  benzocaine 15 mG/menthol 3.6 mG (Sugar-Free) Lozenge 1 Lozenge Oral four times a day PRN Sore Throat  LORazepam   Injectable 2 milliGRAM(s) IV Push once PRN Seizure activity    VITAL SIGNS:  T(C): 36.8 (03-12-21 @ 09:10), Max: 37.1 (03-11-21 @ 21:48)  HR: 86 (03-12-21 @ 09:10) (83 - 86)  BP: 137/89 (03-12-21 @ 12:35) (91/62 - 159/99)  RR: 18 (03-12-21 @ 09:10) (18 - 18)  SpO2: 99% (03-12-21 @ 09:10) (99% - 99%)  Wt(kg): --    PHYSICAL EXAM:  Psychiatric: calm    Cranial Nerves:  II: III/IV/VI: PERRLA 3mm brisk EOMF No nystagmus  V1V2V3: Symmetric, VII: Face appears symmetric VIII: Normal to screening, IX/X: Palate Elevates Symmetrical  XI: Trapezius Symmetric  Motor:  Coordination/Gait:  Finger-nose-finger some difficulty w/ LUE, and LLE  Gait deferred     LABS:  CBC Full  -  ( 12 Mar 2021 12:38 )  WBC Count : 1.76 K/uL  RBC Count : 3.23 M/uL  Hemoglobin : 9.4 g/dL  Hematocrit : 30.8 %  Platelet Count - Automated : 51 K/uL  Mean Cell Volume : 95.4 fl  Mean Cell Hemoglobin : 29.1 pg  Mean Cell Hemoglobin Concentration : 30.5 gm/dL  Auto Neutrophil # : 0.94 K/uL  Auto Lymphocyte # : 0.37 K/uL  Auto Monocyte # : 0.29 K/uL  Auto Eosinophil # : 0.13 K/uL  Auto Basophil # : 0.02 K/uL  Auto Neutrophil % : 53.2 %  Auto Lymphocyte % : 21.1 %  Auto Monocyte % : 16.5 %  Auto Eosinophil % : 7.4 %  Auto Basophil % : 0.9 %    03-12    139  |  106  |  23  ----------------------------<  90  4.4   |  23  |  1.56<H>    Ca    8.5      12 Mar 2021 12:39  Phos  3.3     03-12  Mg     2.0     03-12    TPro  8.6<H>  /  Alb  3.0<L>  /  TBili  0.3  /  DBili  x   /  AST  19  /  ALT  9<L>  /  AlkPhos  37<L>  03-12    LIVER FUNCTIONS - ( 12 Mar 2021 12:39 )  Alb: 3.0 g/dL / Pro: 8.6 g/dL / ALK PHOS: 37 U/L / ALT: 9 U/L / AST: 19 U/L / GGT: x           EEG Daily Summary 3/12/21:    Mild generalized  slowing suggestive of a similar degree of diffuse or multifocal  dysfunction. No epileptiform activity and no significant clinical events occurred.    Electronic Signatures:  Magy Bhatti)  (Signed 12-Mar-2021 13:38)  	Authored: EEG REPORT  Estuardo Casey)  (Signature Pending)  	Co-Signer: EEG REPORT

## 2021-03-12 NOTE — CONSULT NOTE ADULT - SUBJECTIVE AND OBJECTIVE BOX
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 Patient reports he was supposed to go to drug rehabilitation for substance abuse today, however he reports he had a seizure today. Reports incontinence, loss of consciousness reports he does not know how long symptoms went on for. Reports he did not fall or hit his head during seizure event. Patient reports he has been intermittently compliant with his medications and has taken his Keppra intermittently over the past 2 weeks. Reports headache and abdominal pain. ROS otherwise negative.     ED course - VS /91, , RR 18, T 98.4, SpO2 100% ORA. CBC with Hgb 9.6/Hct 31.7, Plt 66. Coags normal. BMP notable with Cl 109, Cr 1.51 (similar to previous hospitalizations). UTox performed and positive for cannabis and cocaine. COVID pcr negative. Blood alcohol negative. CXR without pathology. CT head without pathology. CT abdomen performed and with sigmoid diverticulosis/possible diverticulitis however no WBC count or fever. Patient given 1000 mg keppra IV and 500 mL NS and admitted to medicine for further evaluation and treatment. (09 Mar 2021 20:13)      PAST MEDICAL & SURGICAL HISTORY:  Diabetes type 2, uncontrolled    Bipolar disorder    Cocaine use    HIV (human immunodeficiency virus infection)    Essential hypertension        MEDICATIONS  (STANDING):  acetaminophen   Tablet .. 650 milliGRAM(s) Oral every 6 hours  amLODIPine   Tablet 5 milliGRAM(s) Oral daily  aspirin  chewable 81 milliGRAM(s) Oral daily  atorvastatin 40 milliGRAM(s) Oral at bedtime  atovaquone  Suspension 1500 milliGRAM(s) Oral daily  bictegravir 50 mG/emtricitabine 200 mG/tenofovir alafenamide 25 mG (BIKTARVY) 1 Tablet(s) Oral daily  cefTRIAXone   IVPB 1000 milliGRAM(s) IV Intermittent every 24 hours  dextrose 40% Gel 15 Gram(s) Oral once  dextrose 5%. 1000 milliLiter(s) (50 mL/Hr) IV Continuous <Continuous>  dextrose 5%. 1000 milliLiter(s) (100 mL/Hr) IV Continuous <Continuous>  dextrose 50% Injectable 25 Gram(s) IV Push once  dextrose 50% Injectable 12.5 Gram(s) IV Push once  dextrose 50% Injectable 25 Gram(s) IV Push once  diVALproex ER 1000 milliGRAM(s) Oral at bedtime  glucagon  Injectable 1 milliGRAM(s) IntraMuscular once  insulin lispro (ADMELOG) corrective regimen sliding scale   SubCutaneous Before meals and at bedtime  lidocaine   Patch 1 Patch Transdermal daily  metroNIDAZOLE  IVPB 500 milliGRAM(s) IV Intermittent every 8 hours  pantoprazole    Tablet 40 milliGRAM(s) Oral before breakfast    MEDICATIONS  (PRN):  LORazepam   Injectable 2 milliGRAM(s) IV Push once PRN Seizure activity      FAMILY HISTORY:      CBC Full  -  ( 11 Mar 2021 15:21 )  WBC Count : 1.57 K/uL  RBC Count : 3.04 M/uL  Hemoglobin : 8.9 g/dL  Hematocrit : 29.6 %  Platelet Count - Automated : 45 K/uL  Mean Cell Volume : 97.4 fl  Mean Cell Hemoglobin : 29.3 pg  Mean Cell Hemoglobin Concentration : 30.1 gm/dL  Auto Neutrophil # : 0.87 K/uL  Auto Lymphocyte # : 0.37 K/uL  Auto Monocyte # : 0.17 K/uL  Auto Eosinophil # : 0.10 K/uL  Auto Basophil # : 0.00 K/uL  Auto Neutrophil % : 55.5 %  Auto Lymphocyte % : 23.6 %  Auto Monocyte % : 10.9 %  Auto Eosinophil % : 6.4 %  Auto Basophil % : 0.0 %      03-11    138  |  109<H>  |  23  ----------------------------<  101<H>  4.7   |  20<L>  |  1.70<H>    Ca    8.4      11 Mar 2021 15:21  Phos  3.8     03-11  Mg     1.9     03-11    TPro  8.4<H>  /  Alb  2.6<L>  /  TBili  0.3  /  DBili  x   /  AST  19  /  ALT  9<L>  /  AlkPhos  35<L>  03-11            Radiology:    < from: CT Head No Cont (03.09.21 @ 15:29) >  EXAM:  CT BRAIN                          PROCEDURE DATE:  03/09/2021          INTERPRETATION:  PROCEDURE: CT head without intravenous contrast    INDICATION: Trauma; status post fall; rule out ICH    TECHNIQUE: Multiple axial images were obtained at 5 mm intervals from the skull base to the vertex. Sagittal and coronal reformatted images were obtained from the axial data set. The images were reviewed in brain and bone windows.    COMPARISON: None    FINDINGS: The CT examination demonstrates theventricles, cisternal spaces, and cortical sulci to be within normal limits. There is no midline shift or extra axial collections. The gray white differentiation appears within normal limits. There is patchy hypodensity within the periventricular white matter which is nonspecific and may represent the sequela of small vessel ischemic disease in this patient. There is no intracranial hemorrhage.    The bony windows demonstrates deformity of the medial wall of the right orbit with herniation of extraconal fat through the fracture defect. This may be secondary to previous trauma. There is mucosal thickening within the right sphenoid sinus and posterior left ethmoid air cell. The rest of the visualized paranasal sinuses are within normal limits. The mastoid air cells are well aerated.    IMPRESSION: No intracranial hemorrhage or calvarial fracture. Old right medial orbital wall fracture.        < from: CT Abdomen and Pelvis No Cont (03.09.21 @ 17:41) >  EXAM:  CT ABDOMEN AND PELVIS                          PROCEDURE DATE:  03/09/2021          INTERPRETATION:  CT SCAN OF ABDOMEN AND PELVIS    History: Abdominal pain.    Technique: CT scan of abdomen and pelvis was performed from lung bases through symphysis pubis. Intravenous and oral contrast material were not utilized. Axial, sagittal and coronal reformatted images were reviewed.    Comparison: None.    Findings: Study is limited by motion artifact and noncontrast technique.    Lower chest: Few coronary artery calcifications. Right basilar dependent atelectasis.    Liver:  Normal.    Gallbladder: Normal caliber. No radiopaque stones.    Spleen:  Normal.    Pancreas:  Normal.    Adrenal glands:  Normal.    Kidneys: Normal.    Adenopathy:  No definite pathologic lymphadenopathy. Few mildly prominent mesenteric nodes in left lower quadrant, nonspecific.    Ascites: None.    Gastrointestinal tract: Limited without oral contrast. Colonic diverticulosis with mild sigmoid wall thickening without pericolonic fat stranding. No obstruction or free air.    Vessels: Severe calcification of the aorta and bilateral renal arteries. Small to moderate calcific origin celiac trunk and SMA. Focal fusiform dilatation suprarenal aorta 3.6 cm. Normal caliber remaining visualized abdominal aorta. Distal descending thoracic aorta measures up to 2.9 cm.    Pelvic organs: Bladder and prostate are unremarkable.    Soft tissues: Small fat-containing umbilical hernia.    Bones: Mild degenerative change ofthe spine.    Impression:  1.  Sigmoid diverticulosis with mild mural thickening, differential includes chronic mural hypertrophy or mild colitis/diverticulitis in the appropriate clinical setting.  2.  Severe aortobiiliac calcific atherosclerosis.  3.  Focal fusiform aortic aneurysm above celiac axis 3.6 cm.              Vital Signs Last 24 Hrs  T(C): 36.8 (12 Mar 2021 09:10), Max: 37.2 (11 Mar 2021 12:53)  T(F): 98.2 (12 Mar 2021 09:10), Max: 99 (11 Mar 2021 12:53)  HR: 86 (12 Mar 2021 09:10) (82 - 86)  BP: 91/62 (12 Mar 2021 09:10) (91/62 - 159/99)  BP(mean): --  RR: 18 (12 Mar 2021 09:10) (18 - 18)  SpO2: 99% (12 Mar 2021 09:10) (99% - 99%)    REVIEW OF SYSTEMS:    CONSTITUTIONAL: No fever, weight loss, or fatigue  EYES: No eye pain, visual disturbances, or discharge  ENMT:  No difficulty hearing, tinnitus, vertigo; No sinus or throat pain  NECK: No pain or stiffness  BREASTS: No pain, masses, or nipple discharge  RESPIRATORY: No cough, wheezing, chills or hemoptysis; No shortness of breath  CARDIOVASCULAR: No chest pain, palpitations, dizziness, or leg swelling  GASTROINTESTINAL: No abdominal or epigastric pain. No nausea, vomiting, or hematemesis; No diarrhea or constipation. No melena or hematochezia.  GENITOURINARY: No dysuria, frequency, hematuria, or incontinence  NEUROLOGICAL: seizure  SKIN: No itching, burning, rashes, or lesions   LYMPH NODES: No enlarged glands  ENDOCRINE: No heat or cold intolerance; No hair loss  MUSCULOSKELETAL: No joint pain or swelling; No muscle, back, or extremity pain  PSYCHIATRIC: No depression, anxiety, mood swings, or difficulty sleeping  HEME/LYMPH: No easy bruising, or bleeding gums  ALLERGY AND IMMUNOLOGIC: No hives or eczema  VASCULAR: no swelling, erythema,   : no dysuria, hematuria, frequency        Physical Exam: frail 66 yo AA gentleman lying in bed, irritated, no acute complaints    Head: normocephalic, atraumatic    Eyes: PERRLA, EOMI, no nystagmus, sclera anicteric    ENT: nasal discharge, uvula midline, no oropharyngeal erythema/exudate    Neck: supple, negative JVD, negative carotid bruits, no thyromegaly    Chest: CTA bilaterally, neg wheeze/ rhonchi/ rales/ crackles/ egophany    Cardiovascular: regular rate and rhythm, neg murmurs/rubs/gallops    Abdomen: soft, epigastric TTP, negative rebound/guarding, normal bowel sounds    Extremities: WWP, neg cyanosis/clubbing/edema, negative calf tenderness to palpation, negative Yuki's sign    Musculoskeletal:    Skin:      :     Neurologic Exam:    Alert and oriented to person, place, date/year, speech fluent w/o dysarthria, follows commands, recent and remote memory intact, repetition intact, comprehension intact,  attention/concentration intact, fund of knowledge appropriate    Cranial Nerves:     II:                         pupils equal, round and reactive to light, visual fields intact   III/ IV/VI:             extraocular movements intact, neg nystagmus, neg ptosis  V:                        facial sensation intact, V1-3 normal  VII:                      face symmetric, no droop, normal eye closure and smile  VIII:                     hearing intact to finger rub bilaterally  IX and X:             no hoarseness, gag intact, palate/ uvula rise symmetrically  XI:                       SCM/ trapezius strength intact bilateral  XII:                      no dysarthria, tongue weakness, fasciculations    Motor Exam:    Right UE:           poor effort  < 3/5    Left UE:             poor effort > 3+/5      Right LE:             poor effort  < 3/5        Left LE:               poor effort > 3+/5               Sensation:       Intact to light touch x 4 extremities                                               DTR:                  biceps/brachioradialis: equal bilaterally                                                      patella/ankle: equal bilaterally                                                     neg clonus                           neg Babinski                      Gait:  not tested        PM&R Impression:    1) s/p unwitnessed seizure  2) h/o CVA with r sided weakness    Plan: cleared to begin rehab    1) Physical therapy focusing on therapeutic exercises, bed mobility/transfer out of bed evaluation, progressive ambulation with assistive devices prn.    2) Anticipated Disposition Plan/Recs:   pending functional progress

## 2021-03-12 NOTE — PROGRESS NOTE ADULT - PROBLEM SELECTOR PLAN 3
Patient with a history of known HIV, reports he takes no medications at home for HIV.  - f/u T cell count, viral load count -CD4 count 37, viral load 32k this admission  - HIV consulted-. Started on biktarvy and atovaquone (takes bactrim outpt. but has VINAY vs CKD of Scr 1.5) as per Dr. Ayala, HIV doc. Attempted to call PCP.

## 2021-03-13 DIAGNOSIS — B20 HUMAN IMMUNODEFICIENCY VIRUS [HIV] DISEASE: ICD-10-CM

## 2021-03-13 DIAGNOSIS — K57.92 DIVERTICULITIS OF INTESTINE, PART UNSPECIFIED, WITHOUT PERFORATION OR ABSCESS WITHOUT BLEEDING: ICD-10-CM

## 2021-03-13 DIAGNOSIS — I69.30 UNSPECIFIED SEQUELAE OF CEREBRAL INFARCTION: ICD-10-CM

## 2021-03-13 LAB
ALBUMIN SERPL ELPH-MCNC: 3 G/DL — LOW (ref 3.3–5)
ALP SERPL-CCNC: 34 U/L — LOW (ref 40–120)
ALT FLD-CCNC: 11 U/L — SIGNIFICANT CHANGE UP (ref 10–45)
ANION GAP SERPL CALC-SCNC: 8 MMOL/L — SIGNIFICANT CHANGE UP (ref 5–17)
ANISOCYTOSIS BLD QL: SLIGHT — SIGNIFICANT CHANGE UP
AST SERPL-CCNC: 21 U/L — SIGNIFICANT CHANGE UP (ref 10–40)
BASOPHILS # BLD AUTO: 0.02 K/UL — SIGNIFICANT CHANGE UP (ref 0–0.2)
BASOPHILS NFR BLD AUTO: 1 % — SIGNIFICANT CHANGE UP (ref 0–2)
BILIRUB SERPL-MCNC: 0.3 MG/DL — SIGNIFICANT CHANGE UP (ref 0.2–1.2)
BUN SERPL-MCNC: 19 MG/DL — SIGNIFICANT CHANGE UP (ref 7–23)
CALCIUM SERPL-MCNC: 8.8 MG/DL — SIGNIFICANT CHANGE UP (ref 8.4–10.5)
CHLORIDE SERPL-SCNC: 106 MMOL/L — SIGNIFICANT CHANGE UP (ref 96–108)
CO2 SERPL-SCNC: 23 MMOL/L — SIGNIFICANT CHANGE UP (ref 22–31)
CREAT SERPL-MCNC: 1.42 MG/DL — HIGH (ref 0.5–1.3)
DACRYOCYTES BLD QL SMEAR: SLIGHT — SIGNIFICANT CHANGE UP
ELLIPTOCYTES BLD QL SMEAR: SLIGHT — SIGNIFICANT CHANGE UP
EOSINOPHIL # BLD AUTO: 0.05 K/UL — SIGNIFICANT CHANGE UP (ref 0–0.5)
EOSINOPHIL NFR BLD AUTO: 2.9 % — SIGNIFICANT CHANGE UP (ref 0–6)
GIANT PLATELETS BLD QL SMEAR: PRESENT — SIGNIFICANT CHANGE UP
GLUCOSE BLDC GLUCOMTR-MCNC: 108 MG/DL — HIGH (ref 70–99)
GLUCOSE BLDC GLUCOMTR-MCNC: 77 MG/DL — SIGNIFICANT CHANGE UP (ref 70–99)
GLUCOSE SERPL-MCNC: 90 MG/DL — SIGNIFICANT CHANGE UP (ref 70–99)
HCT VFR BLD CALC: 29.5 % — LOW (ref 39–50)
HGB BLD-MCNC: 9 G/DL — LOW (ref 13–17)
LYMPHOCYTES # BLD AUTO: 0.31 K/UL — LOW (ref 1–3.3)
LYMPHOCYTES # BLD AUTO: 17.3 % — SIGNIFICANT CHANGE UP (ref 13–44)
MACROCYTES BLD QL: SLIGHT — SIGNIFICANT CHANGE UP
MAGNESIUM SERPL-MCNC: 1.9 MG/DL — SIGNIFICANT CHANGE UP (ref 1.6–2.6)
MANUAL SMEAR VERIFICATION: SIGNIFICANT CHANGE UP
MCHC RBC-ENTMCNC: 29 PG — SIGNIFICANT CHANGE UP (ref 27–34)
MCHC RBC-ENTMCNC: 30.5 GM/DL — LOW (ref 32–36)
MCV RBC AUTO: 95.2 FL — SIGNIFICANT CHANGE UP (ref 80–100)
MONOCYTES # BLD AUTO: 0.16 K/UL — SIGNIFICANT CHANGE UP (ref 0–0.9)
MONOCYTES NFR BLD AUTO: 8.7 % — SIGNIFICANT CHANGE UP (ref 2–14)
NEUTROPHILS # BLD AUTO: 1.23 K/UL — LOW (ref 1.8–7.4)
NEUTROPHILS NFR BLD AUTO: 66.3 % — SIGNIFICANT CHANGE UP (ref 43–77)
NEUTS BAND # BLD: 1.9 % — SIGNIFICANT CHANGE UP (ref 0–8)
OVALOCYTES BLD QL SMEAR: SLIGHT — SIGNIFICANT CHANGE UP
PHOSPHATE SERPL-MCNC: 3.4 MG/DL — SIGNIFICANT CHANGE UP (ref 2.5–4.5)
PLAT MORPH BLD: ABNORMAL
PLATELET # BLD AUTO: 53 K/UL — LOW (ref 150–400)
POIKILOCYTOSIS BLD QL AUTO: SLIGHT — SIGNIFICANT CHANGE UP
POTASSIUM SERPL-MCNC: 5.1 MMOL/L — SIGNIFICANT CHANGE UP (ref 3.5–5.3)
POTASSIUM SERPL-SCNC: 5.1 MMOL/L — SIGNIFICANT CHANGE UP (ref 3.5–5.3)
PROT SERPL-MCNC: 8.3 G/DL — SIGNIFICANT CHANGE UP (ref 6–8.3)
RBC # BLD: 3.1 M/UL — LOW (ref 4.2–5.8)
RBC # FLD: 15.5 % — HIGH (ref 10.3–14.5)
RBC BLD AUTO: ABNORMAL
SCHISTOCYTES BLD QL AUTO: SLIGHT — SIGNIFICANT CHANGE UP
SMUDGE CELLS # BLD: PRESENT — SIGNIFICANT CHANGE UP
SODIUM SERPL-SCNC: 137 MMOL/L — SIGNIFICANT CHANGE UP (ref 135–145)
VALPROATE SERPL-MCNC: 57.1 UG/ML — SIGNIFICANT CHANGE UP (ref 50–100)
VARIANT LYMPHS # BLD: 1.9 % — SIGNIFICANT CHANGE UP (ref 0–6)
WBC # BLD: 1.81 K/UL — LOW (ref 3.8–10.5)
WBC # FLD AUTO: 1.81 K/UL — LOW (ref 3.8–10.5)

## 2021-03-13 PROCEDURE — 99232 SBSQ HOSP IP/OBS MODERATE 35: CPT

## 2021-03-13 PROCEDURE — 99233 SBSQ HOSP IP/OBS HIGH 50: CPT | Mod: GC

## 2021-03-13 RX ADMIN — Medication 81 MILLIGRAM(S): at 13:00

## 2021-03-13 RX ADMIN — CEFTRIAXONE 100 MILLIGRAM(S): 500 INJECTION, POWDER, FOR SOLUTION INTRAMUSCULAR; INTRAVENOUS at 12:59

## 2021-03-13 RX ADMIN — BENZOCAINE AND MENTHOL 1 LOZENGE: 5; 1 LIQUID ORAL at 17:19

## 2021-03-13 RX ADMIN — PANTOPRAZOLE SODIUM 40 MILLIGRAM(S): 20 TABLET, DELAYED RELEASE ORAL at 06:10

## 2021-03-13 RX ADMIN — Medication 100 MILLIGRAM(S): at 13:18

## 2021-03-13 RX ADMIN — Medication 100 MILLIGRAM(S): at 06:10

## 2021-03-13 RX ADMIN — LIDOCAINE 1 PATCH: 4 CREAM TOPICAL at 02:35

## 2021-03-13 RX ADMIN — LIDOCAINE 1 PATCH: 4 CREAM TOPICAL at 17:01

## 2021-03-13 RX ADMIN — BICTEGRAVIR SODIUM, EMTRICITABINE, AND TENOFOVIR ALAFENAMIDE FUMARATE 1 TABLET(S): 30; 120; 15 TABLET ORAL at 13:00

## 2021-03-13 RX ADMIN — Medication 650 MILLIGRAM(S): at 06:10

## 2021-03-13 RX ADMIN — Medication 650 MILLIGRAM(S): at 06:46

## 2021-03-13 RX ADMIN — LIDOCAINE 1 PATCH: 4 CREAM TOPICAL at 13:02

## 2021-03-13 RX ADMIN — Medication 650 MILLIGRAM(S): at 12:59

## 2021-03-13 RX ADMIN — AMLODIPINE BESYLATE 5 MILLIGRAM(S): 2.5 TABLET ORAL at 06:10

## 2021-03-13 RX ADMIN — Medication 650 MILLIGRAM(S): at 18:14

## 2021-03-13 NOTE — PROGRESS NOTE ADULT - PROBLEM SELECTOR PLAN 1
-Pt. reporting chronic abdominal pain - 8-9/10 in severity, abdomen tender to palpation throughout, especially in the RLQ, ND; no rebound or guarding  -CT A/P- Sigmoid diverticulosis with mild mural thickening, differential includes chronic mural hypertrophy or mild colitis/diverticulitis in the appropriate clinical setting. Severe aortobiiliac calcific atherosclerosis. Focal fusiform aortic aneurysm above celiac axis 3.6 cm.  -Abd XR- No abnormal bowel dilatation or pneumoperitoneum. Lumbar spine degenerative changes, dextroscoliosis. Abdominal and pelvic vascular calcification.. Heart size within normal limits, thoracic aortic calcification. Lungs and mediastinum are unremarkable. Stable bony structures. Elevation of the left hemidiaphragm.  -Concern for possible diverticulitis- started on ceftriaxone 1 g IV qd, flagyl 500 mg IV q8h  -tylenol 650 mg q6h standing for pain  -monitor for bloody BMs  -med rec obtained from [facility]- [facility]  -Attempted to contact PCP- Dr. Jeff Corral- [facility]   [phone]  -PCP office will fax records to [facility] 3/11

## 2021-03-13 NOTE — PROGRESS NOTE ADULT - PROBLEM SELECTOR PLAN 5
unclear if VINAY vs CKD (pt w/ hx. of HTN, and nonadherence to medications)  -Scr of 1.5 on admission, now increased to Scr 1.70 on 3/11  -f/u urine sodium, urine creatinine  -trend CMP  -avoid nephrotoxic medications

## 2021-03-13 NOTE — PROGRESS NOTE ADULT - PROBLEM SELECTOR PLAN 2
Patient with a longstanding history of seizures, is supposed to take Keppra and reports he takes 'two two times a day', however reports he has not been taking his medications for the past 2 weeks. Given 1000 mg Keppra in the ED.   - vEEG ordered  - Keppra level as add on lab  - Started on keppra 500 mg PO BID- D/juan j keppra on 3/10-> switched to depakote 1000 mg ER qhs and ordered for depakote level.  -Pt. 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  -EKG- NSR  -trop negative

## 2021-03-13 NOTE — PROGRESS NOTE ADULT - ASSESSMENT
65M lives in shelter PMH HIV (unclear if patient takes medications), EtOH abuse last about 2 weeks ago), substance abuse of cocaine and cannabis), HTN, MI in past, CVA x2 (residual right sided weakness), epilepsy presents after reported seizure, with abdominal pain, being treated for diverticulitis on IV abx. Epilepsy/HIV (Dr. Ayala/Mayte) consulted.

## 2021-03-13 NOTE — PROGRESS NOTE ADULT - SUBJECTIVE AND OBJECTIVE BOX
Interval history: no significant events  reporting abdominal discomfort this morning  He removed EEG leads again     Vital Signs Last 24 Hrs  T(C): 36.4 (13 Mar 2021 06:12), Max: 37.1 (12 Mar 2021 21:08)  T(F): 97.6 (13 Mar 2021 06:12), Max: 98.8 (12 Mar 2021 21:08)  HR: 86 (13 Mar 2021 06:12) (76 - 86)  BP: 151/92 (13 Mar 2021 06:12) (126/78 - 151/92)  BP(mean): --  RR: 18 (13 Mar 2021 06:12) (18 - 18)  SpO2: 100% (13 Mar 2021 06:12) (95% - 100%)    Exam:  Awake, alert, oriented, speech fluent, follows commands  right hemiparesis    03-12    139  |  106  |  23  ----------------------------<  90  4.4   |  23  |  1.56<H>    Ca    8.5      12 Mar 2021 12:39  Phos  3.3     03-12  Mg     2.0     03-12    TPro  8.6<H>  /  Alb  3.0<L>  /  TBili  0.3  /  DBili  x   /  AST  19  /  ALT  9<L>  /  AlkPhos  37<L>  03-12                        9.4    1.76  )-----------( 51       ( 12 Mar 2021 12:38 )             30.8   MEDICATIONS  (STANDING):  acetaminophen   Tablet .. 650 milliGRAM(s) Oral every 6 hours  amLODIPine   Tablet 5 milliGRAM(s) Oral daily  aspirin  chewable 81 milliGRAM(s) Oral daily  atorvastatin 40 milliGRAM(s) Oral at bedtime  atovaquone  Suspension 1500 milliGRAM(s) Oral daily  bictegravir 50 mG/emtricitabine 200 mG/tenofovir alafenamide 25 mG (BIKTARVY) 1 Tablet(s) Oral daily  cefTRIAXone   IVPB 1000 milliGRAM(s) IV Intermittent every 24 hours  dextrose 40% Gel 15 Gram(s) Oral once  dextrose 5%. 1000 milliLiter(s) (50 mL/Hr) IV Continuous <Continuous>  dextrose 5%. 1000 milliLiter(s) (100 mL/Hr) IV Continuous <Continuous>  dextrose 50% Injectable 25 Gram(s) IV Push once  dextrose 50% Injectable 12.5 Gram(s) IV Push once  dextrose 50% Injectable 25 Gram(s) IV Push once  diVALproex ER 1000 milliGRAM(s) Oral at bedtime  glucagon  Injectable 1 milliGRAM(s) IntraMuscular once  insulin lispro (ADMELOG) corrective regimen sliding scale   SubCutaneous Before meals and at bedtime  lidocaine   Patch 1 Patch Transdermal daily  metroNIDAZOLE  IVPB 500 milliGRAM(s) IV Intermittent every 8 hours  pantoprazole    Tablet 40 milliGRAM(s) Oral before breakfast

## 2021-03-13 NOTE — PROGRESS NOTE ADULT - SUBJECTIVE AND OBJECTIVE BOX
Patient is a 65y old  Male who presents with a chief complaint of Seizure (13 Mar 2021 14:27)      INTERVAL HPI/OVERNIGHT EVENTS:    Pt. seen and examined earlier today  Pt. refused to continue EEG this AM  Pt. c/o abdominal discomfort, poor appetite, but feels better overall  No F/C, CP, SOB, N/V    Review of Systems: 12 point review of systems otherwise negative    MEDICATIONS  (STANDING):  acetaminophen   Tablet .. 650 milliGRAM(s) Oral every 6 hours  amLODIPine   Tablet 5 milliGRAM(s) Oral daily  aspirin  chewable 81 milliGRAM(s) Oral daily  atorvastatin 40 milliGRAM(s) Oral at bedtime  atovaquone  Suspension 1500 milliGRAM(s) Oral daily  bictegravir 50 mG/emtricitabine 200 mG/tenofovir alafenamide 25 mG (BIKTARVY) 1 Tablet(s) Oral daily  cefTRIAXone   IVPB 1000 milliGRAM(s) IV Intermittent every 24 hours  dextrose 40% Gel 15 Gram(s) Oral once  dextrose 5%. 1000 milliLiter(s) (50 mL/Hr) IV Continuous <Continuous>  dextrose 5%. 1000 milliLiter(s) (100 mL/Hr) IV Continuous <Continuous>  dextrose 50% Injectable 25 Gram(s) IV Push once  dextrose 50% Injectable 12.5 Gram(s) IV Push once  dextrose 50% Injectable 25 Gram(s) IV Push once  diVALproex ER 1000 milliGRAM(s) Oral at bedtime  glucagon  Injectable 1 milliGRAM(s) IntraMuscular once  insulin lispro (ADMELOG) corrective regimen sliding scale   SubCutaneous Before meals and at bedtime  lidocaine   Patch 1 Patch Transdermal daily  metroNIDAZOLE  IVPB 500 milliGRAM(s) IV Intermittent every 8 hours  pantoprazole    Tablet 40 milliGRAM(s) Oral before breakfast    MEDICATIONS  (PRN):  benzocaine 15 mG/menthol 3.6 mG (Sugar-Free) Lozenge 1 Lozenge Oral four times a day PRN Sore Throat  LORazepam   Injectable 2 milliGRAM(s) IV Push once PRN Seizure activity      Allergies    No Known Allergies    Intolerances          Vital Signs Last 24 Hrs  T(C): 36.7 (13 Mar 2021 14:12), Max: 37.1 (12 Mar 2021 21:08)  T(F): 98.1 (13 Mar 2021 14:12), Max: 98.8 (12 Mar 2021 21:08)  HR: 77 (13 Mar 2021 14:12) (76 - 86)  BP: 132/76 (13 Mar 2021 14:12) (126/78 - 151/92)  BP(mean): --  RR: 17 (13 Mar 2021 14:12) (17 - 18)  SpO2: 98% (13 Mar 2021 14:12) (95% - 100%)  CAPILLARY BLOOD GLUCOSE      POCT Blood Glucose.: 108 mg/dL (13 Mar 2021 12:07)  POCT Blood Glucose.: 77 mg/dL (13 Mar 2021 08:17)  POCT Blood Glucose.: 130 mg/dL (12 Mar 2021 21:39)  POCT Blood Glucose.: 123 mg/dL (12 Mar 2021 17:11)      03-12 @ 07:01  -  03-13 @ 07:00  --------------------------------------------------------  IN: 1050 mL / OUT: 0 mL / NET: 1050 mL        Physical Exam:  (earlier today)  Daily     Daily   General: non-toxic and comfortable-appearing in NAD  HEENT:  MMM  Abdomen:  soft ND minimal TTP diffusely, no guarding   Extremities: R hemiparesis (chronic)  Skin: dry, well perfused  Neuro:  AAOx3    LABS:                        9.0    1.81  )-----------( 53       ( 13 Mar 2021 15:29 )             29.5     03-13    137  |  106  |  19  ----------------------------<  90  5.1   |  23  |  1.42<H>    Ca    8.8      13 Mar 2021 15:29  Phos  3.4     03-13  Mg     1.9     03-13    TPro  8.3  /  Alb  3.0<L>  /  TBili  0.3  /  DBili  x   /  AST  21  /  ALT  11  /  AlkPhos  34<L>  03-13

## 2021-03-13 NOTE — PROGRESS NOTE ADULT - PROBLEM SELECTOR PLAN 1
cont. Depakote per Neuro recs, check level in AM; seizure precautions; Pt. refuses further EEG monitoring

## 2021-03-13 NOTE — PROGRESS NOTE ADULT - ASSESSMENT
Seizure disorder, noncompliant with medications  Keppra changes to depakote 1000mg ER qhs  Check valproic acid level tomorrow morning  Will follow periodically, call if any events concerning for seizure   Seizure disorder, noncompliant with medications  Keppra changes to depakote 1000mg ER qhs  Check valproic acid level tomorrow morning  Will follow periodically, call if any events concerning for seizure    ADDENDUM:  VPA level 57 - therapeutic. can continue depakote 1000mg ER qhs

## 2021-03-13 NOTE — PROGRESS NOTE ADULT - SUBJECTIVE AND OBJECTIVE BOX
INTERVAL HPI/OVERNIGHT EVENTS:  Pt.'s IV replaced.    SUBJECTIVE: Patient seen and examined at bedside. Pt. reporting stomach pain 10/10 this AM, mild headache, 9/10 chest pain- as if someone has punched him- in the sternum, nonradiating, localized, NON- reproducible on palpation, no diaphoresis, no radiation to arms. Minimal SOB. Denies nausea, vomiting, diarrhea, constipation, urinary changes.        OBJECTIVE:    VITAL SIGNS:  ICU Vital Signs Last 24 Hrs  T(C): 36.4 (13 Mar 2021 06:12), Max: 37.1 (12 Mar 2021 21:08)  T(F): 97.6 (13 Mar 2021 06:12), Max: 98.8 (12 Mar 2021 21:08)  HR: 86 (13 Mar 2021 06:12) (76 - 86)  BP: 151/92 (13 Mar 2021 06:12) (126/78 - 151/92)  BP(mean): --  ABP: --  ABP(mean): --  RR: 18 (13 Mar 2021 06:12) (18 - 18)  SpO2: 100% (13 Mar 2021 06:12) (95% - 100%)        03-12 @ 07:01  -  03-13 @ 07:00  --------------------------------------------------------  IN: 1050 mL / OUT: 0 mL / NET: 1050 mL      CAPILLARY BLOOD GLUCOSE      POCT Blood Glucose.: 108 mg/dL (13 Mar 2021 12:07)      PHYSICAL EXAM:  Constitutional: A&OX2, vEEG leads on   Head: NC/AT  Eyes: PERRL, EOMI, anicteric sclera  ENT: MMM  Neck: supple; no JVD or thyromegaly  Respiratory: CTA B/L; no W/R/R   Cardiac: +S1/S2; RRR; no M/R/G, sternal chest pain reproducible on palpation  Gastrointestinal: abdomen tender to palpation throughout, especially in the RLQ, ND; no rebound or guarding  Extremities: WWP, no clubbing or cyanosis; no peripheral edema  Musculoskeletal:  resting tremor in bilateral hands, 1-2/5 strength in RUE, 1-2/5 strength in RLE , 5/5 in LUE/LLE  Neurologic: AAOx3; CNII-XII grossly intact.      MEDICATIONS:  MEDICATIONS  (STANDING):  acetaminophen   Tablet .. 650 milliGRAM(s) Oral every 6 hours  amLODIPine   Tablet 5 milliGRAM(s) Oral daily  aspirin  chewable 81 milliGRAM(s) Oral daily  atorvastatin 40 milliGRAM(s) Oral at bedtime  atovaquone  Suspension 1500 milliGRAM(s) Oral daily  bictegravir 50 mG/emtricitabine 200 mG/tenofovir alafenamide 25 mG (BIKTARVY) 1 Tablet(s) Oral daily  cefTRIAXone   IVPB 1000 milliGRAM(s) IV Intermittent every 24 hours  dextrose 40% Gel 15 Gram(s) Oral once  dextrose 5%. 1000 milliLiter(s) (50 mL/Hr) IV Continuous <Continuous>  dextrose 5%. 1000 milliLiter(s) (100 mL/Hr) IV Continuous <Continuous>  dextrose 50% Injectable 25 Gram(s) IV Push once  dextrose 50% Injectable 12.5 Gram(s) IV Push once  dextrose 50% Injectable 25 Gram(s) IV Push once  diVALproex ER 1000 milliGRAM(s) Oral at bedtime  glucagon  Injectable 1 milliGRAM(s) IntraMuscular once  insulin lispro (ADMELOG) corrective regimen sliding scale   SubCutaneous Before meals and at bedtime  lidocaine   Patch 1 Patch Transdermal daily  metroNIDAZOLE  IVPB 500 milliGRAM(s) IV Intermittent every 8 hours  pantoprazole    Tablet 40 milliGRAM(s) Oral before breakfast    MEDICATIONS  (PRN):  benzocaine 15 mG/menthol 3.6 mG (Sugar-Free) Lozenge 1 Lozenge Oral four times a day PRN Sore Throat  LORazepam   Injectable 2 milliGRAM(s) IV Push once PRN Seizure activity      ALLERGIES:  Allergies    No Known Allergies    Intolerances        LABS:                        9.4    1.76  )-----------( 51       ( 12 Mar 2021 12:38 )             30.8     03-12    139  |  106  |  23  ----------------------------<  90  4.4   |  23  |  1.56<H>    Ca    8.5      12 Mar 2021 12:39  Phos  3.3     03-12  Mg     2.0     03-12    TPro  8.6<H>  /  Alb  3.0<L>  /  TBili  0.3  /  DBili  x   /  AST  19  /  ALT  9<L>  /  AlkPhos  37<L>  03-12          RADIOLOGY & ADDITIONAL TESTS: Reviewed.

## 2021-03-14 LAB
CRYPTOC AG FLD QL: NEGATIVE — SIGNIFICANT CHANGE UP
GLUCOSE BLDC GLUCOMTR-MCNC: 103 MG/DL — HIGH (ref 70–99)

## 2021-03-14 PROCEDURE — 99233 SBSQ HOSP IP/OBS HIGH 50: CPT | Mod: GC

## 2021-03-14 RX ADMIN — LIDOCAINE 1 PATCH: 4 CREAM TOPICAL at 11:55

## 2021-03-14 RX ADMIN — Medication 100 MILLIGRAM(S): at 06:17

## 2021-03-14 RX ADMIN — LIDOCAINE 1 PATCH: 4 CREAM TOPICAL at 17:45

## 2021-03-14 RX ADMIN — Medication 650 MILLIGRAM(S): at 06:16

## 2021-03-14 RX ADMIN — Medication 650 MILLIGRAM(S): at 11:55

## 2021-03-14 RX ADMIN — Medication 100 MILLIGRAM(S): at 21:35

## 2021-03-14 RX ADMIN — Medication 650 MILLIGRAM(S): at 12:25

## 2021-03-14 RX ADMIN — CEFTRIAXONE 100 MILLIGRAM(S): 500 INJECTION, POWDER, FOR SOLUTION INTRAMUSCULAR; INTRAVENOUS at 11:54

## 2021-03-14 RX ADMIN — ATORVASTATIN CALCIUM 40 MILLIGRAM(S): 80 TABLET, FILM COATED ORAL at 21:36

## 2021-03-14 RX ADMIN — DIVALPROEX SODIUM 1000 MILLIGRAM(S): 500 TABLET, DELAYED RELEASE ORAL at 21:35

## 2021-03-14 RX ADMIN — BICTEGRAVIR SODIUM, EMTRICITABINE, AND TENOFOVIR ALAFENAMIDE FUMARATE 1 TABLET(S): 30; 120; 15 TABLET ORAL at 11:54

## 2021-03-14 RX ADMIN — Medication 81 MILLIGRAM(S): at 11:54

## 2021-03-14 RX ADMIN — AMLODIPINE BESYLATE 5 MILLIGRAM(S): 2.5 TABLET ORAL at 06:17

## 2021-03-14 RX ADMIN — PANTOPRAZOLE SODIUM 40 MILLIGRAM(S): 20 TABLET, DELAYED RELEASE ORAL at 06:18

## 2021-03-14 RX ADMIN — LIDOCAINE 1 PATCH: 4 CREAM TOPICAL at 01:15

## 2021-03-14 RX ADMIN — Medication 100 MILLIGRAM(S): at 14:02

## 2021-03-14 NOTE — DIETITIAN INITIAL EVALUATION ADULT. - OTHER INFO
66y/o HIV male with history of diverticulitis, VINAY, Polysubstance abuse, HTN ,MI, CVA with right sided weakness, admitted with seizures and abdominal pain .Appetite reported to be poor due to pain .No N/V/BM3/14.Skin intact.84% of IBW. Reported weight loss but could not quantify UBW. Noted plans for [facility].Was residing in shelter.Suspected due to shelter,poor po and polysubstance abuse some degree of malnutrition.Will request MVI and ONS addition for added nutrients.

## 2021-03-14 NOTE — PROGRESS NOTE ADULT - SUBJECTIVE AND OBJECTIVE BOX
Patient is a 65y old  Male who presents with a chief complaint of Seizure (14 Mar 2021 11:49)      INTERVAL HPI/OVERNIGHT EVENTS:    Pt. seen and examined earlier today  Pt. reports improved abdominal pain and PO intake  No new complaints  No F/C, CP, SOB, N/V    Review of Systems: 12 point review of systems otherwise negative    MEDICATIONS  (STANDING):  acetaminophen   Tablet .. 650 milliGRAM(s) Oral every 6 hours  amLODIPine   Tablet 5 milliGRAM(s) Oral daily  aspirin  chewable 81 milliGRAM(s) Oral daily  atorvastatin 40 milliGRAM(s) Oral at bedtime  atovaquone  Suspension 1500 milliGRAM(s) Oral daily  bictegravir 50 mG/emtricitabine 200 mG/tenofovir alafenamide 25 mG (BIKTARVY) 1 Tablet(s) Oral daily  cefTRIAXone   IVPB 1000 milliGRAM(s) IV Intermittent every 24 hours  dextrose 40% Gel 15 Gram(s) Oral once  dextrose 5%. 1000 milliLiter(s) (50 mL/Hr) IV Continuous <Continuous>  dextrose 5%. 1000 milliLiter(s) (100 mL/Hr) IV Continuous <Continuous>  dextrose 50% Injectable 25 Gram(s) IV Push once  dextrose 50% Injectable 12.5 Gram(s) IV Push once  dextrose 50% Injectable 25 Gram(s) IV Push once  diVALproex ER 1000 milliGRAM(s) Oral at bedtime  glucagon  Injectable 1 milliGRAM(s) IntraMuscular once  insulin lispro (ADMELOG) corrective regimen sliding scale   SubCutaneous Before meals and at bedtime  lidocaine   Patch 1 Patch Transdermal daily  metroNIDAZOLE  IVPB 500 milliGRAM(s) IV Intermittent every 8 hours  pantoprazole    Tablet 40 milliGRAM(s) Oral before breakfast    MEDICATIONS  (PRN):  benzocaine 15 mG/menthol 3.6 mG (Sugar-Free) Lozenge 1 Lozenge Oral four times a day PRN Sore Throat  LORazepam   Injectable 2 milliGRAM(s) IV Push once PRN Seizure activity      Allergies    No Known Allergies    Intolerances          Vital Signs Last 24 Hrs  T(C): 36.8 (14 Mar 2021 06:00), Max: 36.8 (14 Mar 2021 06:00)  T(F): 98.3 (14 Mar 2021 06:00), Max: 98.3 (14 Mar 2021 06:00)  HR: 96 (14 Mar 2021 06:00) (77 - 96)  BP: 157/98 (14 Mar 2021 06:00) (132/76 - 157/98)  BP(mean): --  RR: 18 (14 Mar 2021 06:00) (17 - 18)  SpO2: 100% (14 Mar 2021 06:00) (98% - 100%)  CAPILLARY BLOOD GLUCOSE      POCT Blood Glucose.: 103 mg/dL (14 Mar 2021 11:40)        Physical Exam:  (earlier today)  Daily     Daily   General: non-toxic and comfortable-appearing in NAD  HEENT:  MMM  Abdomen:  soft NT ND  Extremities: R hemiparesis (chronic)  Skin: dry, well perfused  Neuro:  AAOx3    LABS:                        9.0    1.81  )-----------( 53       ( 13 Mar 2021 15:29 )             29.5     03-13    137  |  106  |  19  ----------------------------<  90  5.1   |  23  |  1.42<H>    Ca    8.8      13 Mar 2021 15:29  Phos  3.4     03-13  Mg     1.9     03-13    TPro  8.3  /  Alb  3.0<L>  /  TBili  0.3  /  DBili  x   /  AST  21  /  ALT  11  /  AlkPhos  34<L>  03-13

## 2021-03-14 NOTE — DIETITIAN INITIAL EVALUATION ADULT. - PROBLEM SELECTOR PLAN 4
Patient with thrombopenia. Possible causes of thrombocytopenia include EtOH abuse vs HIV infection.   - F/u AM CBC and HIV labs

## 2021-03-14 NOTE — DIETITIAN INITIAL EVALUATION ADULT. - PROBLEM SELECTOR PLAN 5
Patient with a history of hypertension. Reports he takes 'a blood pressure pill' at home 'sometimes'. Does not check ambulatory BPs. Patient pressure on admission is hypertensive to 162/102.  - Start amlodipine 5 mg PO Qd for now

## 2021-03-15 ENCOUNTER — TRANSCRIPTION ENCOUNTER (OUTPATIENT)
Age: 66
End: 2021-03-15

## 2021-03-15 PROBLEM — Z00.00 ENCOUNTER FOR PREVENTIVE HEALTH EXAMINATION: Status: ACTIVE | Noted: 2021-03-15

## 2021-03-15 PROCEDURE — 36000 PLACE NEEDLE IN VEIN: CPT

## 2021-03-15 PROCEDURE — 99232 SBSQ HOSP IP/OBS MODERATE 35: CPT

## 2021-03-15 PROCEDURE — 99233 SBSQ HOSP IP/OBS HIGH 50: CPT | Mod: GC

## 2021-03-15 PROCEDURE — 76937 US GUIDE VASCULAR ACCESS: CPT | Mod: 26

## 2021-03-15 RX ORDER — METRONIDAZOLE 500 MG
500 TABLET ORAL EVERY 8 HOURS
Refills: 0 | Status: DISCONTINUED | OUTPATIENT
Start: 2021-03-15 | End: 2021-03-17

## 2021-03-15 RX ADMIN — CEFTRIAXONE 100 MILLIGRAM(S): 500 INJECTION, POWDER, FOR SOLUTION INTRAMUSCULAR; INTRAVENOUS at 11:20

## 2021-03-15 RX ADMIN — Medication 650 MILLIGRAM(S): at 06:03

## 2021-03-15 RX ADMIN — AMLODIPINE BESYLATE 5 MILLIGRAM(S): 2.5 TABLET ORAL at 06:03

## 2021-03-15 RX ADMIN — Medication 650 MILLIGRAM(S): at 22:15

## 2021-03-15 RX ADMIN — BICTEGRAVIR SODIUM, EMTRICITABINE, AND TENOFOVIR ALAFENAMIDE FUMARATE 1 TABLET(S): 30; 120; 15 TABLET ORAL at 11:19

## 2021-03-15 RX ADMIN — LIDOCAINE 1 PATCH: 4 CREAM TOPICAL at 17:07

## 2021-03-15 RX ADMIN — Medication 100 MILLIGRAM(S): at 14:11

## 2021-03-15 RX ADMIN — LIDOCAINE 1 PATCH: 4 CREAM TOPICAL at 11:21

## 2021-03-15 RX ADMIN — Medication 81 MILLIGRAM(S): at 11:20

## 2021-03-15 RX ADMIN — LIDOCAINE 1 PATCH: 4 CREAM TOPICAL at 00:23

## 2021-03-15 RX ADMIN — Medication 100 MILLIGRAM(S): at 06:03

## 2021-03-15 NOTE — PROGRESS NOTE ADULT - SUBJECTIVE AND OBJECTIVE BOX
INTERVAL HPI/OVERNIGHT EVENTS:    SUBJECTIVE: Patient seen and examined at bedside.    OBJECTIVE:    VITAL SIGNS:  ICU Vital Signs Last 24 Hrs  T(C): 37.1 (15 Mar 2021 06:13), Max: 37.1 (15 Mar 2021 06:13)  T(F): 98.7 (15 Mar 2021 06:13), Max: 98.7 (15 Mar 2021 06:13)  HR: 95 (15 Mar 2021 06:13) (86 - 95)  BP: 123/69 (15 Mar 2021 06:13) (123/69 - 149/78)  BP(mean): --  ABP: --  ABP(mean): --  RR: 17 (15 Mar 2021 06:13) (16 - 17)  SpO2: 96% (15 Mar 2021 06:13) (96% - 100%)        03-14 @ 07:01  -  03-15 @ 07:00  --------------------------------------------------------  IN: 0 mL / OUT: 500 mL / NET: -500 mL      CAPILLARY BLOOD GLUCOSE      POCT Blood Glucose.: 103 mg/dL (14 Mar 2021 11:40)      PHYSICAL EXAM:        MEDICATIONS:  MEDICATIONS  (STANDING):  acetaminophen   Tablet .. 650 milliGRAM(s) Oral every 6 hours  amLODIPine   Tablet 5 milliGRAM(s) Oral daily  aspirin  chewable 81 milliGRAM(s) Oral daily  atorvastatin 40 milliGRAM(s) Oral at bedtime  atovaquone  Suspension 1500 milliGRAM(s) Oral daily  bictegravir 50 mG/emtricitabine 200 mG/tenofovir alafenamide 25 mG (BIKTARVY) 1 Tablet(s) Oral daily  cefTRIAXone   IVPB 1000 milliGRAM(s) IV Intermittent every 24 hours  dextrose 40% Gel 15 Gram(s) Oral once  dextrose 5%. 1000 milliLiter(s) (50 mL/Hr) IV Continuous <Continuous>  dextrose 5%. 1000 milliLiter(s) (100 mL/Hr) IV Continuous <Continuous>  dextrose 50% Injectable 25 Gram(s) IV Push once  dextrose 50% Injectable 12.5 Gram(s) IV Push once  dextrose 50% Injectable 25 Gram(s) IV Push once  diVALproex ER 1000 milliGRAM(s) Oral at bedtime  glucagon  Injectable 1 milliGRAM(s) IntraMuscular once  insulin lispro (ADMELOG) corrective regimen sliding scale   SubCutaneous Before meals and at bedtime  lidocaine   Patch 1 Patch Transdermal daily  metroNIDAZOLE  IVPB 500 milliGRAM(s) IV Intermittent every 8 hours  pantoprazole    Tablet 40 milliGRAM(s) Oral before breakfast    MEDICATIONS  (PRN):  benzocaine 15 mG/menthol 3.6 mG (Sugar-Free) Lozenge 1 Lozenge Oral four times a day PRN Sore Throat  LORazepam   Injectable 2 milliGRAM(s) IV Push once PRN Seizure activity      ALLERGIES:  Allergies    No Known Allergies    Intolerances        LABS:                        9.0    1.81  )-----------( 53       ( 13 Mar 2021 15:29 )             29.5     03-13    137  |  106  |  19  ----------------------------<  90  5.1   |  23  |  1.42<H>    Ca    8.8      13 Mar 2021 15:29  Phos  3.4     03-13  Mg     1.9     03-13    TPro  8.3  /  Alb  3.0<L>  /  TBili  0.3  /  DBili  x   /  AST  21  /  ALT  11  /  AlkPhos  34<L>  03-13          RADIOLOGY & ADDITIONAL TESTS: Reviewed. INTERVAL HPI/OVERNIGHT EVENTS:  NAEON.     SUBJECTIVE: Patient seen and examined at bedside. Pt. A&Ox3. Reports feeling "terrible". Describes stomach pain as 10/10, reports chest pain (likely MSK in origin- previously reproducible to palpation, localized to sternum, non radiatiing) he previously had is improved. He notes he had 3 episodes of diarrhea last night. Notes minimal SOB. He denies nausea, vomiting, leg pain, urinary changes, constipation.     OBJECTIVE:    VITAL SIGNS:  ICU Vital Signs Last 24 Hrs  T(C): 37.1 (15 Mar 2021 06:13), Max: 37.1 (15 Mar 2021 06:13)  T(F): 98.7 (15 Mar 2021 06:13), Max: 98.7 (15 Mar 2021 06:13)  HR: 95 (15 Mar 2021 06:13) (86 - 95)  BP: 123/69 (15 Mar 2021 06:13) (123/69 - 149/78)  BP(mean): --  ABP: --  ABP(mean): --  RR: 17 (15 Mar 2021 06:13) (16 - 17)  SpO2: 96% (15 Mar 2021 06:13) (96% - 100%)        03-14 @ 07:01  -  03-15 @ 07:00  --------------------------------------------------------  IN: 0 mL / OUT: 500 mL / NET: -500 mL      CAPILLARY BLOOD GLUCOSE      POCT Blood Glucose.: 103 mg/dL (14 Mar 2021 11:40)      PHYSICAL EXAM:  Constitutional: A&OX3  Head: NC/AT  Eyes: PERRL, EOMI, anicteric sclera  ENT: MMM  Neck: supple; no JVD or thyromegaly  Respiratory: CTA B/L; no W/R/R   Cardiac: +S1/S2; tachycardic, regular rhythm; no M/R/G  Gastrointestinal: abdomen tender to palpation throughout, especially in the RLQ, ND; no rebound or guarding  Extremities: WWP, no clubbing or cyanosis; no peripheral edema  Musculoskeletal:  resting tremor in bilateral hands, 1-2/5 strength in RUE, 1-2/5 strength in RLE , 5/5 in LUE/LLE  Neurologic: AAOx3; CNII-XII grossly intact.        MEDICATIONS:  MEDICATIONS  (STANDING):  acetaminophen   Tablet .. 650 milliGRAM(s) Oral every 6 hours  amLODIPine   Tablet 5 milliGRAM(s) Oral daily  aspirin  chewable 81 milliGRAM(s) Oral daily  atorvastatin 40 milliGRAM(s) Oral at bedtime  atovaquone  Suspension 1500 milliGRAM(s) Oral daily  bictegravir 50 mG/emtricitabine 200 mG/tenofovir alafenamide 25 mG (BIKTARVY) 1 Tablet(s) Oral daily  cefTRIAXone   IVPB 1000 milliGRAM(s) IV Intermittent every 24 hours  dextrose 40% Gel 15 Gram(s) Oral once  dextrose 5%. 1000 milliLiter(s) (50 mL/Hr) IV Continuous <Continuous>  dextrose 5%. 1000 milliLiter(s) (100 mL/Hr) IV Continuous <Continuous>  dextrose 50% Injectable 25 Gram(s) IV Push once  dextrose 50% Injectable 12.5 Gram(s) IV Push once  dextrose 50% Injectable 25 Gram(s) IV Push once  diVALproex ER 1000 milliGRAM(s) Oral at bedtime  glucagon  Injectable 1 milliGRAM(s) IntraMuscular once  insulin lispro (ADMELOG) corrective regimen sliding scale   SubCutaneous Before meals and at bedtime  lidocaine   Patch 1 Patch Transdermal daily  metroNIDAZOLE  IVPB 500 milliGRAM(s) IV Intermittent every 8 hours  pantoprazole    Tablet 40 milliGRAM(s) Oral before breakfast    MEDICATIONS  (PRN):  benzocaine 15 mG/menthol 3.6 mG (Sugar-Free) Lozenge 1 Lozenge Oral four times a day PRN Sore Throat  LORazepam   Injectable 2 milliGRAM(s) IV Push once PRN Seizure activity      ALLERGIES:  Allergies    No Known Allergies    Intolerances        LABS:                        9.0    1.81  )-----------( 53       ( 13 Mar 2021 15:29 )             29.5     03-13    137  |  106  |  19  ----------------------------<  90  5.1   |  23  |  1.42<H>    Ca    8.8      13 Mar 2021 15:29  Phos  3.4     03-13  Mg     1.9     03-13    TPro  8.3  /  Alb  3.0<L>  /  TBili  0.3  /  DBili  x   /  AST  21  /  ALT  11  /  AlkPhos  34<L>  03-13          RADIOLOGY & ADDITIONAL TESTS: Reviewed.

## 2021-03-15 NOTE — DISCHARGE NOTE PROVIDER - NSDCCPCAREPLAN_GEN_ALL_CORE_FT
PRINCIPAL DISCHARGE DIAGNOSIS  Diagnosis: Seizures  Assessment and Plan of Treatment:       SECONDARY DISCHARGE DIAGNOSES  Diagnosis: Failure to thrive in adult  Assessment and Plan of Treatment:      PRINCIPAL DISCHARGE DIAGNOSIS  Diagnosis: Seizures  Assessment and Plan of Treatment: You were admitted for a seizure. We consulted the epilepsy team; per their recommendation, please continue taking Depakote 1000mg at bedtime and follow up with your primary care doctor.      SECONDARY DISCHARGE DIAGNOSES  Diagnosis: Diverticulitis  Assessment and Plan of Treatment: You had abdominal pain on admission, which was due to diverticulitis (infection of an outpouching of your colon). We treated you for it with antibiotics.  Please follow up with your primary care doctor.    Diagnosis: Failure to thrive in adult  Assessment and Plan of Treatment:

## 2021-03-15 NOTE — PROGRESS NOTE ADULT - SUBJECTIVE AND OBJECTIVE BOX
EPILEPSY PROGRESS NOTE:  Subjective:  There were no reported seizure events overnight. Patient appear to be in a good mood today, and state that he awaiting discharge to rehab.     REVIEW OF SYSTEMS:  CONSTITUTIONAL:  No weight loss, fever, chills, weakness or fatigue.  HEENT:  Eyes:  No visual loss, blurred vision, double vision or yellow sclerae. Ears, Nose, Throat:  No hearing loss, sneezing, congestion, runny nose or sore throat.  SKIN:  No rash or itching.  CARDIOVASCULAR:  No chest pain, chest pressure or chest discomfort. No palpitations or edema.  RESPIRATORY:  No shortness of breath, cough or sputum.  GASTROINTESTINAL:  No anorexia, nausea, vomiting or diarrhea. No abdominal pain or blood.  GENITOURINARY: No Burning on urination.   NEUROLOGICAL:  see HPI  MUSCULOSKELETAL:  No muscle, back pain, joint pain or stiffness.  HEMATOLOGIC:  No anemia, bleeding or bruising.  LYMPHATICS:  No enlarged nodes. No history of splenectomy.  PSYCHIATRIC:  No history of depression or anxiety.  ENDOCRINOLOGIC:  No reports of sweating, cold or heat intolerance. No polyuria or polydipsia.  ALLERGIES:  No history of asthma, hives, eczema or rhinitis.    MEDICATIONS  (STANDING):  acetaminophen   Tablet .. 650 milliGRAM(s) Oral every 6 hours  amLODIPine   Tablet 5 milliGRAM(s) Oral daily  aspirin  chewable 81 milliGRAM(s) Oral daily  atorvastatin 40 milliGRAM(s) Oral at bedtime  atovaquone  Suspension 1500 milliGRAM(s) Oral daily  bictegravir 50 mG/emtricitabine 200 mG/tenofovir alafenamide 25 mG (BIKTARVY) 1 Tablet(s) Oral daily  cefTRIAXone   IVPB 1000 milliGRAM(s) IV Intermittent every 24 hours  dextrose 40% Gel 15 Gram(s) Oral once  dextrose 5%. 1000 milliLiter(s) (50 mL/Hr) IV Continuous <Continuous>  dextrose 5%. 1000 milliLiter(s) (100 mL/Hr) IV Continuous <Continuous>  dextrose 50% Injectable 25 Gram(s) IV Push once  dextrose 50% Injectable 12.5 Gram(s) IV Push once  dextrose 50% Injectable 25 Gram(s) IV Push once  diVALproex ER 1000 milliGRAM(s) Oral at bedtime  glucagon  Injectable 1 milliGRAM(s) IntraMuscular once  insulin lispro (ADMELOG) corrective regimen sliding scale   SubCutaneous Before meals and at bedtime  lidocaine   Patch 1 Patch Transdermal daily  metroNIDAZOLE  IVPB 500 milliGRAM(s) IV Intermittent every 8 hours  metroNIDAZOLE  IVPB 500 milliGRAM(s) IV Intermittent every 8 hours  pantoprazole    Tablet 40 milliGRAM(s) Oral before breakfast    MEDICATIONS  (PRN):  benzocaine 15 mG/menthol 3.6 mG (Sugar-Free) Lozenge 1 Lozenge Oral four times a day PRN Sore Throat  LORazepam   Injectable 2 milliGRAM(s) IV Push once PRN Seizure activity    VITAL SIGNS:  T(C): 36.7 (03-15-21 @ 13:17), Max: 37.1 (03-15-21 @ 06:13)  HR: 90 (03-15-21 @ 13:17) (86 - 95)  BP: 130/81 (03-15-21 @ 13:17) (123/69 - 149/78)  RR: 17 (03-15-21 @ 13:17) (16 - 17)  SpO2: 99% (03-15-21 @ 13:17) (96% - 100%)  Wt(kg): --    PHYSICAL EXAM:  Psychiatric: calm    Cranial Nerves:  II: III/IV/VI: PERRLA 3mm brisk EOMF No nystagmus  V1V2V3: Symmetric, VII: Face appears symmetric VIII: Normal to screening, IX/X: Palate Elevates Symmetrical  XI: Trapezius Symmetric    Motor: Moves RUE 4/5 and RLE 3/5. LUE and LLE 5/5  Coordination/Gait: Finger-nose-finger some difficulty w/ LUE, and LLE  Gait deferred     LABS:  CBC Full  -  ( 13 Mar 2021 15:29 )  WBC Count : 1.81 K/uL  RBC Count : 3.10 M/uL  Hemoglobin : 9.0 g/dL  Hematocrit : 29.5 %  Platelet Count - Automated : 53 K/uL  Mean Cell Volume : 95.2 fl  Mean Cell Hemoglobin : 29.0 pg  Mean Cell Hemoglobin Concentration : 30.5 gm/dL  Auto Neutrophil # : 1.23 K/uL  Auto Lymphocyte # : 0.31 K/uL  Auto Monocyte # : 0.16 K/uL  Auto Eosinophil # : 0.05 K/uL  Auto Basophil # : 0.02 K/uL  Auto Neutrophil % : 66.3 %  Auto Lymphocyte % : 17.3 %  Auto Monocyte % : 8.7 %  Auto Eosinophil % : 2.9 %  Auto Basophil % : 1.0 %    03-13    137  |  106  |  19  ----------------------------<  90  5.1   |  23  |  1.42<H>    Ca    8.8      13 Mar 2021 15:29  Phos  3.4     03-13  Mg     1.9     03-13    TPro  8.3  /  Alb  3.0<L>  /  TBili  0.3  /  DBili  x   /  AST  21  /  ALT  11  /  AlkPhos  34<L>  03-13    LIVER FUNCTIONS - ( 13 Mar 2021 15:29 )  Alb: 3.0 g/dL / Pro: 8.3 g/dL / ALK PHOS: 34 U/L / ALT: 11 U/L / AST: 21 U/L / GGT: x

## 2021-03-15 NOTE — PROGRESS NOTE ADULT - SUBJECTIVE AND OBJECTIVE BOX
Physical Medicine and Rehabilitation Progress Note:    Patient is a 65y old  Male who presents with a chief complaint of Seizure (15 Mar 2021 12:37)      HPI:  65M PMH HIV, EtOH (last drink 2 weeks ago), substance abuse (crack and cannabis), HTN, MI, CVA x 2 with residual right sided weakness, epilepsy on Keppra presents from shelter. Patient reports he was supposed to go to drug rehabilitation for substance abuse today, however he reports he had a seizure today. Reports incontinence, loss of consciousness reports he does not know how long symptoms went on for. Reports he did not fall or hit his head during seizure event. Patient reports he has been intermittently compliant with his medications and has taken his Keppra intermittently over the past 2 weeks. Reports headache and abdominal pain. ROS otherwise negative.     ED course - VS /91, , RR 18, T 98.4, SpO2 100% ORA. CBC with Hgb 9.6/Hct 31.7, Plt 66. Coags normal. BMP notable with Cl 109, Cr 1.51 (similar to previous hospitalizations). UTox performed and positive for cannabis and cocaine. COVID pcr negative. Blood alcohol negative. CXR without pathology. CT head without pathology. CT abdomen performed and with sigmoid diverticulosis/possible diverticulitis however no WBC count or fever. Patient given 1000 mg keppra IV and 500 mL NS and admitted to medicine for further evaluation and treatment. (09 Mar 2021 20:13)                            9.0    1.81  )-----------( 53       ( 13 Mar 2021 15:29 )             29.5       03-13    137  |  106  |  19  ----------------------------<  90  5.1   |  23  |  1.42<H>    Ca    8.8      13 Mar 2021 15:29  Phos  3.4     03-13  Mg     1.9     03-13    TPro  8.3  /  Alb  3.0<L>  /  TBili  0.3  /  DBili  x   /  AST  21  /  ALT  11  /  AlkPhos  34<L>  03-13    Vital Signs Last 24 Hrs  T(C): 36.7 (15 Mar 2021 13:17), Max: 37.1 (15 Mar 2021 06:13)  T(F): 98.1 (15 Mar 2021 13:17), Max: 98.7 (15 Mar 2021 06:13)  HR: 90 (15 Mar 2021 13:17) (90 - 95)  BP: 130/81 (15 Mar 2021 13:17) (123/69 - 137/85)  BP(mean): --  RR: 17 (15 Mar 2021 13:17) (16 - 17)  SpO2: 99% (15 Mar 2021 13:17) (96% - 99%)    MEDICATIONS  (STANDING):  acetaminophen   Tablet .. 650 milliGRAM(s) Oral every 6 hours  amLODIPine   Tablet 5 milliGRAM(s) Oral daily  aspirin  chewable 81 milliGRAM(s) Oral daily  atorvastatin 40 milliGRAM(s) Oral at bedtime  atovaquone  Suspension 1500 milliGRAM(s) Oral daily  bictegravir 50 mG/emtricitabine 200 mG/tenofovir alafenamide 25 mG (BIKTARVY) 1 Tablet(s) Oral daily  cefTRIAXone   IVPB 1000 milliGRAM(s) IV Intermittent every 24 hours  dextrose 40% Gel 15 Gram(s) Oral once  dextrose 5%. 1000 milliLiter(s) (50 mL/Hr) IV Continuous <Continuous>  dextrose 5%. 1000 milliLiter(s) (100 mL/Hr) IV Continuous <Continuous>  dextrose 50% Injectable 25 Gram(s) IV Push once  dextrose 50% Injectable 12.5 Gram(s) IV Push once  dextrose 50% Injectable 25 Gram(s) IV Push once  diVALproex ER 1000 milliGRAM(s) Oral at bedtime  glucagon  Injectable 1 milliGRAM(s) IntraMuscular once  insulin lispro (ADMELOG) corrective regimen sliding scale   SubCutaneous Before meals and at bedtime  lidocaine   Patch 1 Patch Transdermal daily  metroNIDAZOLE  IVPB 500 milliGRAM(s) IV Intermittent every 8 hours  metroNIDAZOLE  IVPB 500 milliGRAM(s) IV Intermittent every 8 hours  pantoprazole    Tablet 40 milliGRAM(s) Oral before breakfast    MEDICATIONS  (PRN):  benzocaine 15 mG/menthol 3.6 mG (Sugar-Free) Lozenge 1 Lozenge Oral four times a day PRN Sore Throat  LORazepam   Injectable 2 milliGRAM(s) IV Push once PRN Seizure activity    Currently Undergoing Physical/ Occupational Therapy at bedside.    Functional Status Assessment:         Therapeutic Interventions      Bed Mobility  Bed Mobility Training Scooting: contact guard;  verbal cues;  1 person assist;  bed rails  Bed Mobility Training Sit-to-Supine: minimum assist (75% patient effort);  verbal cues;  1 person assist;  bed rails  Bed Mobility Training Supine-to-Sit: minimum assist (75% patient effort);  verbal cues;  1 person assist;  bed rails  Bed Mobility Training Limitations: decreased ability to use arms for pushing/pulling;  decreased ability to use legs for bridging/pushing;  impaired ability to control trunk for mobility;  decreased RUE strength;  pain;  impaired postural control;  decreased strength    Sit-Stand Transfer Training  Transfer Training Sit-to-Stand Transfer: minimum assist (75% patient effort);  2 person assist;  verbal cues;  full weight-bearing   rolling walker  Transfer Training Stand-to-Sit Transfer: minimum assist (75% patient effort);  2 person assist;  verbal cues;  full weight-bearing   rolling walker  Sit-to-Stand Transfer Training Transfer Safety Analysis: decreased balance;  decreased strength;  impaired balance;  impaired postural control;  pain;  rolling walker    Gait Training  Gait Training: minimum assist (75% patient effort);  2 person assist;  verbal cues;  nonverbal cues (demo/gestures);  full weight-bearing   rolling walker;  10 feet;  x2  Gait Analysis: swing-to gait   decreased susy;  increased time in double stance;  decreased step length;  decreased stride length;  decreased weight-shifting ability;  decreased velocity of limb motion;  decreased strength;  impaired balance;  impaired postural control;  pain;  benefits from increased time for ambulation and min verbal cues for rolling walker placement;  10 feet;  x2;  rolling walker  Gait Number of Times:: x 2  Type of Rest Type of Rest: sitting  Duration of Rest Duration of Rest: 2 minutes seated EOB           PM&R Impression: as above    Current Disposition Plan Recommendations:    subacute rehab placement

## 2021-03-15 NOTE — PROGRESS NOTE ADULT - PROBLEM SELECTOR PLAN 7
Patient with a history of hypertension. Reports he takes 'a blood pressure pill' at home 'sometimes'. Does not check ambulatory BPs. Patient pressure on admission is hypertensive to 162/102.  - Start amlodipine 5 mg PO Qd for now- increase as needed Patient with a history of hypertension. Reports he takes 'a blood pressure pill' at home 'sometimes'. Does not check ambulatory BPs. Patient pressure on admission is hypertensive to 162/102.  - Started amlodipine 5 mg PO Qd for now- increase as needed

## 2021-03-15 NOTE — PROGRESS NOTE ADULT - PROBLEM SELECTOR PLAN 5
unclear if VINAY vs CKD (pt w/ hx. of HTN, and nonadherence to medications)  -Scr of 1.5 on admission, now increased to Scr 1.70 on 3/11  -f/u urine sodium, urine creatinine  -trend CMP  -avoid nephrotoxic medications unclear if VINAY vs CKD (pt w/ hx. of HTN, and nonadherence to medications)  -Scr of 1.5 on admission, now downtrending to 1.42 on 3/15  -f/u urine sodium, urine creatinine  -trend CMP  -avoid nephrotoxic medications

## 2021-03-15 NOTE — PROCEDURE NOTE - NSICDXPROCEDURE_GEN_ALL_CORE_FT
PROCEDURES:  Insertion, catheter, intravenous 15-Mar-2021 23:58:32  Michelet Stover  Ultrasound guided venous access 15-Mar-2021 23:58:25  Michelet Stover

## 2021-03-15 NOTE — DISCHARGE NOTE PROVIDER - HOSPITAL COURSE
#Discharge: do not delete    Patient is 65M lives in shelter PMH HIV (unclear if patient takes medications), EtOH abuse last about 2 weeks ago), substance abuse of cocaine and cannabis), HTN, MI in past, CVA x2 (residual right sided weakness), epilepsy presents after reported seizure, with abdominal pain, being treated for diverticulitis on IV abx. Epilepsy/HIV (Dr. Ayala/Mayte) consulted.    Problem List/Main Diagnoses (system-based):   Abdominal pain   -Pt. reporting chronic abdominal pain - 8-9/10 in severity, abdomen tender to palpation throughout, especially in the RLQ, ND; no rebound or guarding  -CT A/P- Sigmoid diverticulosis with mild mural thickening, differential includes chronic mural hypertrophy or mild colitis/diverticulitis in the appropriate clinical setting. Severe aortobiiliac calcific atherosclerosis. Focal fusiform aortic aneurysm above celiac axis 3.6 cm.  -Abd XR- No abnormal bowel dilatation or pneumoperitoneum. Lumbar spine degenerative changes, dextroscoliosis. Abdominal and pelvic vascular calcification.. Heart size within normal limits, thoracic aortic calcification. Lungs and mediastinum are unremarkable. Stable bony structures. Elevation of the left hemidiaphragm.  -Concern for possible diverticulitis- started on ceftriaxone 1 g IV qd, flagyl 500 mg IV q8h for 7 day course (day 5/7- last day 3/17)  -tylenol 650 mg q6h standing for pain  -monitor for bloody BMs  -med rec obtained from [facility]  -Attempted to contact PCP- Dr. Jeff Corral- [facility]   [phone]  -attempted to reach PCP office to fax records to [facility] 3/11.      Seizures  -Patient with a longstanding history of seizures, is supposed to take Keppra and reports he takes 'two two times a day', however reports he has not been taking his medications for the past 2 weeks. Given 1000 mg Keppra in the ED.   - vEEG ordered- pt. has been intermittently refusing   - Keppra level as add on lab  - Started on keppra 500 mg PO BID- D/juan j keppra on 3/10-> switched to depakote 1000 mg ER qhs and ordered for depakote level.  -Pt. refusing vEEG monitoring intermittently despite stressing importance to rule out seizure activity  - ativan 2 mg IV PRN for seizure activity    #CVA  -CVA x2 with residual right sided weakness- 1-2/5 strength in RUE, 1-2/5 strength in RLE   -atorvastatin/aspirin started 3/11  -f/u PT/OT recs- [facility]     #Chest pain  -likely MSK in origin- reporting 9/10 chest pain- as if someone has punched him- in the sternum, nonradiating, localized, reproducible on palpation, no diaphoresis, radiation to arms.  -EKG- NSR  -trop negative.      HIV (human immunodeficiency virus infection)  - Patient with a history of known HIV, reports he takes no medications at home for HIV.  - f/u T cell count, viral load count -CD4 count 37, viral load 32k this admission  - HIV consulted-. Started on biktarvy and atovaquone (takes bactrim outpt. but has VINAY vs CKD of Scr 1.5) as per Dr. Ayala, HIV doc.   -Cryptococcal antigen negative   -Attempted to call PCP.      Pancytopenia  -Patient with thrombopenia, leukopenia, anemia- pancytopenic. Possible causes of thrombocytopenia include EtOH abuse vs HIV infection.   - Trend CBC  -no signs of bleeding carol.     VINAY (acute kidney injury)  - [resolving] unclear if VINAY vs CKD (pt w/ hx. of HTN, and nonadherence to medications)  -Scr of 1.5 on admission, now downtrending to 1.42 on 3/15  -f/u urine sodium, urine creatinine  -trend CMP  -avoid nephrotoxic medications.    Diabetes type 2, uncontrolled  -Patient with a history of DM type 2, reports he takes no medications for this at home.  - HgA1c 5.4- well controlled   - mISS for now, no need to start nutritional or basal insulin yet.     Essential hypertension  -Patient with a history of hypertension. Reports he takes 'a blood pressure pill' at home 'sometimes'. Does not check ambulatory BPs. Patient pressure on admission is hypertensive to 162/102.  - Started amlodipine 5 mg PO Qd for now- increase as needed.     Polysubstance abuse  - Patient reports he abuses crack cocaine and marijuana. As per patient he was planning to go to drug rehabilitation center today, however had seizure prior to presentation to clinic. UTox positive for cocaine metabolites and cannabinoid metabolites.  - Continue to monitor, NTD at this time.     New medications:   Labs to be followed outpatient:   Exam to be followed outpatient:    #Discharge: do not delete    Patient is 65M lives in shelter PMH HIV (unclear if patient takes medications), EtOH abuse last about 2 weeks ago), substance abuse of cocaine and cannabis), HTN, MI in past, CVA x2 (residual right sided weakness), epilepsy presents after reported seizure, with abdominal pain, being treated for diverticulitis on IV abx. Epilepsy/HIV (Dr. Ayala/Mayte) consulted.    Problem List/Main Diagnoses (system-based):   Abdominal pain   -Pt. reporting chronic abdominal pain - 8-9/10 in severity, abdomen tender to palpation throughout, especially in the RLQ, ND; no rebound or guarding  -CT A/P- Sigmoid diverticulosis with mild mural thickening, differential includes chronic mural hypertrophy or mild colitis/diverticulitis in the appropriate clinical setting. Severe aortobiiliac calcific atherosclerosis. Focal fusiform aortic aneurysm above celiac axis 3.6 cm.  -Abd XR- No abnormal bowel dilatation or pneumoperitoneum. Lumbar spine degenerative changes, dextroscoliosis. Abdominal and pelvic vascular calcification.. Heart size within normal limits, thoracic aortic calcification. Lungs and mediastinum are unremarkable. Stable bony structures. Elevation of the left hemidiaphragm.  -Concern for possible diverticulitis- started on ceftriaxone 1 g IV qd, flagyl 500 mg IV q8h for 7 day course (day 5/7- last day 3/17)  -tylenol 650 mg q6h standing for pain  -monitor for bloody BMs  -med rec obtained from [facility]  -Attempted to contact PCP- Dr. Jeff Corral- [facility]   [phone]  -attempted to reach PCP office to fax records to [facility] 3/11.    Seizures  -Patient with a longstanding history of seizures, is supposed to take Keppra and reports he takes 'two two times a day', however reports he has not been taking his medications for the past 2 weeks. Given 1000 mg Keppra in the ED.   - vEEG ordered- pt. has been intermittently refusing   - Keppra level as add on lab  - Started on keppra 500 mg PO BID- D/juan j keppra on 3/10-> switched to depakote 1000 mg ER qhs and ordered for depakote level.  -Pt. refusing vEEG monitoring intermittently despite stressing importance to rule out seizure activity  - ativan 2 mg IV PRN for seizure activity    #CVA  -CVA x2 with residual right sided weakness- 1-2/5 strength in RUE, 1-2/5 strength in RLE   -atorvastatin/aspirin started 3/11    #Chest pain  -likely MSK in origin- reporting 9/10 chest pain- as if someone has punched him- in the sternum, nonradiating, localized, reproducible on palpation, no diaphoresis, radiation to arms.  -EKG- NSR  -trop negative.    HIV (human immunodeficiency virus infection)  - Patient with a history of known HIV, reports he takes no medications at home for HIV.  - f/u T cell count, viral load count -CD4 count 37, viral load 32k this admission  - HIV consulted-. Started on biktarvy and atovaquone (takes bactrim outpt. but has VINAY vs CKD of Scr 1.5) as per Dr. Ayala, HIV doc.   -Cryptococcal antigen negative   -Attempted to call PCP.    Pancytopenia  -Patient with thrombopenia, leukopenia, anemia- pancytopenic. Possible causes of thrombocytopenia include EtOH abuse vs HIV infection.   - Trend CBC  -no signs of bleeding on discharge    VINAY (acute kidney injury)  - [resolving] unclear if VINAY vs CKD (pt w/ hx. of HTN, and nonadherence to medications)  -Scr of 1.5 on admission, now downtrending to 1.42 on 3/15  -f/u urine sodium, urine creatinine  -trend CMP  -avoid nephrotoxic medications.    Diabetes type 2, uncontrolled  -Patient with a history of DM type 2, reports he takes no medications for this at home.  - HgA1c 5.4- well controlled   - mISS for now, no need to start nutritional or basal insulin yet.     Essential hypertension  -Patient with a history of hypertension. Reports he takes 'a blood pressure pill' at home 'sometimes'. Does not check ambulatory BPs. Patient pressure on admission is hypertensive to 162/102.  - Started amlodipine 5 mg PO Qd for now- increase as needed.     Polysubstance abuse  - Patient reports he abuses crack cocaine and marijuana. As per patient he was planning to go to drug rehabilitation center today, however had seizure prior to presentation to clinic. UTox positive for cocaine metabolites and cannabinoid metabolites.  - Continue to monitor, NTD at this time.     New medications: see medication list  Labs to be followed outpatient: none  Exam to be followed outpatient: none   #Discharge: do not delete    Patient is 65M lives in shelter PMH HIV (unclear if patient takes medications), EtOH abuse last about 2 weeks ago), substance abuse of cocaine and cannabis), HTN, MI in past, CVA x2 (residual right sided weakness), epilepsy presents after reported seizure, with abdominal pain, being treated for diverticulitis on IV abx. Epilepsy/HIV (Dr. Ayala/Mayte) consulted.    Problem List/Main Diagnoses (system-based):   Abdominal pain   -Pt. reporting chronic abdominal pain - 8-9/10 in severity, abdomen tender to palpation throughout, especially in the RLQ, ND; no rebound or guarding  -CT A/P- Sigmoid diverticulosis with mild mural thickening, differential includes chronic mural hypertrophy or mild colitis/diverticulitis in the appropriate clinical setting. Severe aortobiiliac calcific atherosclerosis. Focal fusiform aortic aneurysm above celiac axis 3.6 cm.  -Abd XR- No abnormal bowel dilatation or pneumoperitoneum. Lumbar spine degenerative changes, dextroscoliosis. Abdominal and pelvic vascular calcification.. Heart size within normal limits, thoracic aortic calcification. Lungs and mediastinum are unremarkable. Stable bony structures. Elevation of the left hemidiaphragm.  -Concern for possible diverticulitis- started on ceftriaxone 1 g IV qd, flagyl 500 mg IV q8h for 7 day course (day 5/7- last day 3/17)  -tylenol 650 mg q6h standing for pain  -monitor for bloody BMs  -med rec obtained from [facility]  -Attempted to contact PCP- Dr. Jeff Corral- [facility]   [phone]  -attempted to reach PCP office to fax records to [facility] 3/11.    Seizures  -Patient with a longstanding history of seizures, is supposed to take Keppra and reports he takes 'two two times a day', however reports he has not been taking his medications for the past 2 weeks. Given 1000 mg Keppra in the ED.   - vEEG ordered- pt. has been intermittently refusing   - Keppra level as add on lab  - Started on keppra 500 mg PO BID- D/juan j keppra on 3/10-> switched to depakote 1000 mg ER qhs and ordered for depakote level.  -Pt. refusing vEEG monitoring intermittently despite stressing importance to rule out seizure activity  - ativan 2 mg IV PRN for seizure activity    #CVA  -CVA x2 with residual right sided weakness- 1-2/5 strength in RUE, 1-2/5 strength in RLE   -atorvastatin/aspirin started 3/11    #Chest pain  -likely MSK in origin- reporting 9/10 chest pain- as if someone has punched him- in the sternum, nonradiating, localized, reproducible on palpation, no diaphoresis, radiation to arms.  -EKG- NSR  -trop negative.    HIV (human immunodeficiency virus infection)  - Patient with a history of known HIV, reports he takes no medications at home for HIV.  - f/u T cell count, viral load count -CD4 count 37, viral load 32k this admission  - HIV consulted-. Started on biktarvy and atovaquone (takes bactrim outpt. but has VINAY vs CKD of Scr 1.5) as per Dr. Ayala, HIV doc.   -Cryptococcal antigen negative   -Attempted to call PCP.    Pancytopenia  -Patient with thrombopenia, leukopenia, anemia- pancytopenic. Possible causes of thrombocytopenia include EtOH abuse vs HIV infection.   - Trend CBC  -no signs of bleeding on discharge    VINAY (acute kidney injury)  - [resolving] unclear if VINAY vs CKD (pt w/ hx. of HTN, and nonadherence to medications)  -Scr of 1.5 on admission, now downtrending to 1.42 on 3/15  -f/u urine sodium, urine creatinine  -trend CMP  -avoid nephrotoxic medications.    Diabetes type 2, uncontrolled  -Patient with a history of DM type 2, reports he takes no medications for this at home.  - HgA1c 5.4- well controlled   - mISS for now, no need to start nutritional or basal insulin yet.     Essential hypertension  -Patient with a history of hypertension. Reports he takes 'a blood pressure pill' at home 'sometimes'. Does not check ambulatory BPs. Patient pressure on admission is hypertensive to 162/102.  - Started amlodipine 5 mg PO Qd for now- increase as needed.     Polysubstance abuse  - Patient reports he abuses crack cocaine and marijuana. As per patient he was planning to go to drug rehabilitation center today, however had seizure prior to presentation to clinic. UTox positive for cocaine metabolites and cannabinoid metabolites.  - Continue to monitor, NTD at this time.     New medications: see medication list  Labs to be followed outpatient: none  Exam to be followed outpatient: none    Attending attestation  65M shelter resident, h/o epilepsy on keppra, CVA w R sided weakness, HIV, HTN, polysubstance abuse (EtOH 2wk ago; UDS positive for cocaine and THC) pending transfer to substance use rehab, HTN, CAD, p/w seizure activity, CTH nml but possible medication non-adherence, found to have pancytopenia, diverticulitis, CKD3, started on IV CTX+Flagyl w abd pain improving, tolerating diet wo issue, vEEG negative - suspect possible non-adherence to keppra and switched to depakote by neurology, CD4 found to be 37 - likely 2/2 non-adherence and restarted on biktarvy and pt switched from bactrim to atovaquone for PCP ppx in setting of pancytopenia    Pt states diarrhea has resolved and abd pain is controlled. Eating meals wo nausea or worsening abd pain.     Exam on day of discharge: male in  NAD on RA, MMM, RRR, nml effort, CTAB, Abd soft, non-tender, NABS, A/O x3, moving all extremities, Appropriate and conversant.  Med Changes: NEW: Atovaquone 1500 daily, Depakote 1000mg qHS; STOP bactrim  Pending labs/tests: NA  Provider F/U: PMD - Dr. Escalona, HIV - Dr. Ayala 3/23 12PM  Recommendations for outpatient: ART restarted as pt was non-adherent - f/u CD4 counts    Patient stable for discharge to [facility] on RA today    _35_min spent on DC. Discussed with housestaff    Thank you for allowing the [facility] Teaching Service to care for your patient. For any questions after your discharge not answered by your primary care physician or providers you have been scheduled with for follow-up above, please call [phone] and ask to speak to the Senior House Officer     #Discharge: do not delete    Patient is 65M lives in shelter PMH HIV (unclear if patient takes medications), EtOH abuse last about 2 weeks ago), substance abuse of cocaine and cannabis), HTN, MI in past, CVA x2 (residual right sided weakness), epilepsy presents after reported seizure, with abdominal pain, being treated for diverticulitis on IV abx. Epilepsy/HIV (Dr. Ayala/Mayte) consulted.    Problem List/Main Diagnoses (system-based):   Abdominal pain   -Pt. reporting chronic abdominal pain - 8-9/10 in severity, abdomen tender to palpation throughout, especially in the RLQ, ND; no rebound or guarding  -CT A/P- Sigmoid diverticulosis with mild mural thickening, differential includes chronic mural hypertrophy or mild colitis/diverticulitis in the appropriate clinical setting. Severe aortobiiliac calcific atherosclerosis. Focal fusiform aortic aneurysm above celiac axis 3.6 cm.  -Abd XR- No abnormal bowel dilatation or pneumoperitoneum. Lumbar spine degenerative changes, dextroscoliosis. Abdominal and pelvic vascular calcification.. Heart size within normal limits, thoracic aortic calcification. Lungs and mediastinum are unremarkable. Stable bony structures. Elevation of the left hemidiaphragm.  -Concern for possible diverticulitis- started on ceftriaxone 1 g IV qd, flagyl 500 mg IV q8h for 7 day course (day 5/7- last day 3/17)  -tylenol 650 mg q6h standing for pain  -monitor for bloody BMs  -med rec obtained from [facility]  -Attempted to contact PCP- Dr. Jeff Corral- [facility]   [phone]  -attempted to reach PCP office to fax records to [facility] 3/11.    Seizures  -Patient with a longstanding history of seizures, is supposed to take Keppra and reports he takes 'two two times a day', however reports he has not been taking his medications for the past 2 weeks. Given 1000 mg Keppra in the ED.   - vEEG ordered- pt. has been intermittently refusing   - Keppra level as add on lab  - Started on keppra 500 mg PO BID- D/juan j keppra on 3/10-> switched to depakote 1000 mg ER qhs and ordered for depakote level.  -Pt. refusing vEEG monitoring intermittently despite stressing importance to rule out seizure activity  - ativan 2 mg IV PRN for seizure activity    #CVA  -CVA x2 with residual right sided weakness- 1-2/5 strength in RUE, 1-2/5 strength in RLE   -atorvastatin/aspirin started 3/11    #Chest pain  -likely MSK in origin- reporting 9/10 chest pain- as if someone has punched him- in the sternum, nonradiating, localized, reproducible on palpation, no diaphoresis, radiation to arms.  -EKG- NSR  -trop negative.    HIV (human immunodeficiency virus infection)  - Patient with a history of known HIV, reports he takes no medications at home for HIV.  - f/u T cell count, viral load count -CD4 count 37, viral load 32k this admission  - HIV consulted-. Started on biktarvy and atovaquone (takes bactrim outpt. but has VINAY vs CKD of Scr 1.5) as per Dr. Ayala, HIV doc.   -Cryptococcal antigen negative   -Attempted to call PCP.    Pancytopenia  -Patient with thrombopenia, leukopenia, anemia- pancytopenic. Possible causes of thrombocytopenia include EtOH abuse vs HIV infection.   - Trend CBC  -no signs of bleeding on discharge    VINAY (acute kidney injury)  - [resolving] unclear if VINAY vs CKD (pt w/ hx. of HTN, and nonadherence to medications)  -Scr of 1.5 on admission, now downtrending to 1.42 on 3/15  -f/u urine sodium, urine creatinine  -trend CMP  -avoid nephrotoxic medications.    Diabetes type 2, uncontrolled  -Patient with a history of DM type 2, reports he takes no medications for this at home.  - HgA1c 5.4- well controlled   - mISS for now, no need to start nutritional or basal insulin yet.     Essential hypertension  -Patient with a history of hypertension. Reports he takes 'a blood pressure pill' at home 'sometimes'. Does not check ambulatory BPs. Patient pressure on admission is hypertensive to 162/102.  - Started amlodipine 5 mg PO Qd for now- increase as needed.     Polysubstance abuse  - Patient reports he abuses crack cocaine and marijuana. As per patient he was planning to go to drug rehabilitation center today, however had seizure prior to presentation to clinic. UTox positive for cocaine metabolites and cannabinoid metabolites.  - Continue to monitor, NTD at this time.     New medications: see medication list  Labs to be followed outpatient: none  Exam to be followed outpatient: none    Attending attestation  65M shelter resident, h/o epilepsy on keppra, CVA w R sided weakness, HIV, HTN, polysubstance abuse (EtOH 2wk ago; UDS positive for cocaine and THC) pending transfer to substance use rehab, HTN, CAD, p/w seizure activity, CTH nml but possible medication non-adherence, found to have pancytopenia, diverticulitis, CKD3, started on IV CTX+Flagyl w abd pain improving, tolerating diet wo issue, vEEG negative - suspect possible non-adherence to keppra and switched to depakote by neurology, CD4 found to be 37 - likely 2/2 non-adherence and restarted on biktarvy and pt switched from bactrim to atovaquone for PCP ppx in setting of pancytopenia, recommended for [facility] - likely 2/2 ambulatory dysfunction and deconditioning     Pt states diarrhea has resolved and abd pain is controlled. Eating meals wo nausea or worsening abd pain.     Exam on day of discharge: male in  NAD on RA, MMM, RRR, nml effort, CTAB, Abd soft, non-tender, NABS, A/O x3, moving all extremities, Appropriate and conversant.  Med Changes: NEW: Atovaquone 1500 daily, Depakote 1000mg qHS; STOP bactrim  Pending labs/tests: NA  Provider F/U: PMD - Dr. Escalona, HIV - Dr. Ayala 3/23 12PM  Recommendations for outpatient: ART restarted as pt was non-adherent - f/u CD4 counts    Patient stable for discharge to [facility] on RA today    _35_min spent on DC. Discussed with housestaff    Thank you for allowing the [facility] Teaching Service to care for your patient. For any questions after your discharge not answered by your primary care physician or providers you have been scheduled with for follow-up above, please call [phone] and ask to speak to the Senior House Officer     #Discharge: do not delete    Patient is 65M lives in shelter PMH HIV (unclear if patient takes medications), EtOH abuse last about 2 weeks ago), substance abuse of cocaine and cannabis), HTN, MI in past, CVA x2 (residual right sided weakness), epilepsy presents after reported seizure, with abdominal pain, being treated for diverticulitis on IV abx. Epilepsy/HIV (Dr. Ayala/Mayte) consulted.    Problem List/Main Diagnoses (system-based):   Diverticulitis   -Pt. reporting chronic abdominal pain - 8-9/10 in severity, abdomen tender to palpation throughout, especially in the RLQ, ND; no rebound or guarding  -CT A/P- Sigmoid diverticulosis with mild mural thickening, differential includes chronic mural hypertrophy or mild colitis/diverticulitis in the appropriate clinical setting. Severe aortobiiliac calcific atherosclerosis. Focal fusiform aortic aneurysm above celiac axis 3.6 cm.  -Abd XR- No abnormal bowel dilatation or pneumoperitoneum. Lumbar spine degenerative changes, dextroscoliosis. Abdominal and pelvic vascular calcification.. Heart size within normal limits, thoracic aortic calcification. Lungs and mediastinum are unremarkable. Stable bony structures. Elevation of the left hemidiaphragm.  -Concern for possible diverticulitis- started on ceftriaxone 1 g IV qd, flagyl 500 mg IV q8h for 7 day course (day 5/7- last day 3/17)  -tylenol 650 mg q6h standing for pain  -monitor for bloody BMs  -med rec obtained from [facility]  -Attempted to contact PCP- Dr. Jeff Corral- [facility]   [phone]  -attempted to reach PCP office to fax records to [facility] 3/11.    Seizures  -Patient with a longstanding history of seizures, is supposed to take Keppra and reports he takes 'two two times a day', however reports he has not been taking his medications for the past 2 weeks. Given 1000 mg Keppra in the ED.   - vEEG ordered- pt. has been intermittently refusing   - Keppra level as add on lab  - Started on keppra 500 mg PO BID- D/juan j keppra on 3/10-> switched to depakote 1000 mg ER qhs and ordered for depakote level.  -Pt. refusing vEEG monitoring intermittently despite stressing importance to rule out seizure activity  - ativan 2 mg IV PRN for seizure activity    #CVA  -CVA x2 with residual right sided weakness- 1-2/5 strength in RUE, 1-2/5 strength in RLE   -atorvastatin/aspirin started 3/11    #Chest pain  -likely MSK in origin- reporting 9/10 chest pain- as if someone has punched him- in the sternum, nonradiating, localized, reproducible on palpation, no diaphoresis, radiation to arms.  -EKG- NSR  -trop negative.    HIV (human immunodeficiency virus infection)  - Patient with a history of known HIV, reports he takes no medications at home for HIV.  - f/u T cell count, viral load count -CD4 count 37, viral load 32k this admission  - HIV consulted-. Started on biktarvy and atovaquone (takes bactrim outpt. but has VINAY vs CKD of Scr 1.5) as per Dr. Ayala, HIV doc.   -Cryptococcal antigen negative   -Attempted to call PCP.    Pancytopenia  -Patient with thrombopenia, leukopenia, anemia- pancytopenic. Possible causes of thrombocytopenia include EtOH abuse vs HIV infection.   - Trend CBC  -no signs of bleeding on discharge    VINAY (acute kidney injury)  - [resolving] unclear if VINAY vs CKD (pt w/ hx. of HTN, and nonadherence to medications)  -Scr of 1.5 on admission, now downtrending to 1.42 on 3/15  -f/u urine sodium, urine creatinine  -trend CMP  -avoid nephrotoxic medications.    Diabetes type 2, uncontrolled  -Patient with a history of DM type 2, reports he takes no medications for this at home.  - HgA1c 5.4- well controlled   - mISS for now, no need to start nutritional or basal insulin yet.     Essential hypertension  -Patient with a history of hypertension. Reports he takes 'a blood pressure pill' at home 'sometimes'. Does not check ambulatory BPs. Patient pressure on admission is hypertensive to 162/102.  - Started amlodipine 5 mg PO Qd for now- increase as needed.     Polysubstance abuse  - Patient reports he abuses crack cocaine and marijuana. As per patient he was planning to go to drug rehabilitation center today, however had seizure prior to presentation to clinic. UTox positive for cocaine metabolites and cannabinoid metabolites.  - Continue to monitor, NTD at this time.     New medications: see medication list  Labs to be followed outpatient: none  Exam to be followed outpatient: none    Attending attestation  65M shelter resident, h/o epilepsy on keppra, CVA w R sided weakness, HIV, HTN, polysubstance abuse (EtOH 2wk ago; UDS positive for cocaine and THC) pending transfer to substance use rehab, HTN, CAD, p/w seizure activity, CTH nml but possible medication non-adherence, found to have pancytopenia, diverticulitis, CKD3, started on IV CTX+Flagyl w abd pain improving, tolerating diet wo issue, vEEG negative - suspect possible non-adherence to keppra and switched to depakote by neurology, CD4 found to be 37 - likely 2/2 non-adherence and restarted on biktarvy and pt switched from bactrim to atovaquone for PCP ppx in setting of pancytopenia, recommended for [facility] - likely 2/2 ambulatory dysfunction and deconditioning     Pt states diarrhea has resolved and abd pain is controlled. Eating meals wo nausea or worsening abd pain.     Exam on day of discharge: male in  NAD on RA, MMM, RRR, nml effort, CTAB, Abd soft, non-tender, NABS, A/O x3, moving all extremities, Appropriate and conversant.  Med Changes: NEW: Atovaquone 1500 daily, Depakote 1000mg qHS; STOP bactrim  Pending labs/tests: NA  Provider F/U: PMD - Dr. Escalona, HIV - Dr. Ayala 3/23 12PM  Recommendations for outpatient: ART restarted as pt was non-adherent - f/u CD4 counts    Patient stable for discharge to [facility] on RA today    _35_min spent on DC. Discussed with housestaff    Thank you for allowing the [facility] Teaching Service to care for your patient. For any questions after your discharge not answered by your primary care physician or providers you have been scheduled with for follow-up above, please call [phone] and ask to speak to the Senior House Officer

## 2021-03-15 NOTE — DISCHARGE NOTE PROVIDER - CARE PROVIDERS DIRECT ADDRESSES
,DirectAddress_Unknown ,DirectAddress_Unknown,[email or web address]

## 2021-03-15 NOTE — DISCHARGE NOTE PROVIDER - NSDCMRMEDTOKEN_GEN_ALL_CORE_FT
Almacone oral tablet, chewable:   aspirin 81 mg oral delayed release tablet: 1 tab(s) orally once a day  Bactrim  mg-160 mg oral tablet: 1 tab(s) orally once a week  Biktarvy oral tablet: 2 tab(s) orally once a week  famotidine 40 mg oral tablet: 1 tab(s) orally once a day (at bedtime)  ferrous sulfate 325 mg (65 mg elemental iron) oral tablet: 1 tab(s) orally once a day  folic acid 1 mg oral tablet: 1 tab(s) orally once a day  gabapentin enacarbil 600 mg oral tablet, extended release: 1 tab(s) orally once a day  loratadine 10 mg oral tablet: 1 tab(s) orally once a day, As Needed  Multiple Vitamins oral capsule: 1 cap(s) orally once a day  Ventolin HFA 90 mcg/inh inhalation aerosol: 2 puff(s) inhaled every 6 hours  Vitamin B12 1000 mcg oral tablet: 1 tab(s) orally once a day  Vitamin D3 50,000 intl units (1250 mcg) oral capsule: 1 tab(s) orally once a day   Almacone oral tablet, chewable:   amLODIPine 5 mg oral tablet: 1 tab(s) orally once a day  aspirin 81 mg oral delayed release tablet: 1 tab(s) orally once a day  atorvastatin 40 mg oral tablet: 1 tab(s) orally once a day (at bedtime)  atovaquone 750 mg/5 mL oral suspension: 10 milliliter(s) orally once a day  Bactrim  mg-160 mg oral tablet: 1 tab(s) orally once a week  Biktarvy oral tablet: 2 tab(s) orally once a week  divalproex sodium 500 mg oral tablet, extended release: 2 tab(s) orally once a day (at bedtime)  famotidine 40 mg oral tablet: 1 tab(s) orally once a day (at bedtime)  ferrous sulfate 325 mg (65 mg elemental iron) oral tablet: 1 tab(s) orally once a day  folic acid 1 mg oral tablet: 1 tab(s) orally once a day  gabapentin enacarbil 600 mg oral tablet, extended release: 1 tab(s) orally once a day  loratadine 10 mg oral tablet: 1 tab(s) orally once a day, As Needed  menthol-benzocaine 3.6 mg-15 mg mucous membrane lozenge:  mucous membrane   Multiple Vitamins oral capsule: 1 cap(s) orally once a day  pantoprazole 40 mg oral delayed release tablet: 1 tab(s) orally once a day (before a meal)  Ventolin HFA 90 mcg/inh inhalation aerosol: 2 puff(s) inhaled every 6 hours  Vitamin B12 1000 mcg oral tablet: 1 tab(s) orally once a day  Vitamin D3 50,000 intl units (1250 mcg) oral capsule: 1 tab(s) orally once a day   Almacone oral tablet, chewable:   amLODIPine 5 mg oral tablet: 1 tab(s) orally once a day  aspirin 81 mg oral delayed release tablet: 1 tab(s) orally once a day  atorvastatin 40 mg oral tablet: 1 tab(s) orally once a day (at bedtime)  atovaquone 750 mg/5 mL oral suspension: 10 milliliter(s) orally once a day  Biktarvy oral tablet: 2 tab(s) orally once a week  divalproex sodium 500 mg oral tablet, extended release: 2 tab(s) orally once a day (at bedtime)  famotidine 40 mg oral tablet: 1 tab(s) orally once a day (at bedtime)  ferrous sulfate 325 mg (65 mg elemental iron) oral tablet: 1 tab(s) orally once a day  folic acid 1 mg oral tablet: 1 tab(s) orally once a day  gabapentin enacarbil 600 mg oral tablet, extended release: 1 tab(s) orally once a day  loratadine 10 mg oral tablet: 1 tab(s) orally once a day, As Needed  menthol-benzocaine 3.6 mg-15 mg mucous membrane lozenge:  mucous membrane   Multiple Vitamins oral capsule: 1 cap(s) orally once a day  pantoprazole 40 mg oral delayed release tablet: 1 tab(s) orally once a day (before a meal)  Ventolin HFA 90 mcg/inh inhalation aerosol: 2 puff(s) inhaled every 6 hours  Vitamin B12 1000 mcg oral tablet: 1 tab(s) orally once a day  Vitamin D3 50,000 intl units (1250 mcg) oral capsule: 1 tab(s) orally once a day

## 2021-03-15 NOTE — PROGRESS NOTE ADULT - ASSESSMENT
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  Admitted on 3/9 for 2 unwitnessed reported seizure events. EEG thus far has not shown epileptiform activity.  History obtained from patient indicate uncontrolled seizure events, and would prefer a once daily regimen for seizure medication.      Recommendations:  Please obtain valproic acid level in AM  Continue Depakote 1000mg ER QHS  Maintain seizure and fall precautions

## 2021-03-15 NOTE — DISCHARGE NOTE PROVIDER - PROVIDER TOKENS
PROVIDER:[TOKEN:[[id number]],SCHEDULEDAPPT:[03/24/2021],SCHEDULEDAPPTTIME:[03:00 PM],ESTABLISHEDPATIENT:[T]] PROVIDER:[TOKEN:[[id number]],SCHEDULEDAPPT:[03/24/2021],SCHEDULEDAPPTTIME:[03:00 PM],ESTABLISHEDPATIENT:[T]],PROVIDER:[TOKEN:[[id number]],SCHEDULEDAPPT:[03/23/2021],SCHEDULEDAPPTTIME:[12:00 PM]]

## 2021-03-15 NOTE — PROGRESS NOTE ADULT - PROBLEM SELECTOR PLAN 1
-Pt. reporting chronic abdominal pain - 8-9/10 in severity, abdomen tender to palpation throughout, especially in the RLQ, ND; no rebound or guarding  -CT A/P- Sigmoid diverticulosis with mild mural thickening, differential includes chronic mural hypertrophy or mild colitis/diverticulitis in the appropriate clinical setting. Severe aortobiiliac calcific atherosclerosis. Focal fusiform aortic aneurysm above celiac axis 3.6 cm.  -Abd XR- No abnormal bowel dilatation or pneumoperitoneum. Lumbar spine degenerative changes, dextroscoliosis. Abdominal and pelvic vascular calcification.. Heart size within normal limits, thoracic aortic calcification. Lungs and mediastinum are unremarkable. Stable bony structures. Elevation of the left hemidiaphragm.  -Concern for possible diverticulitis- started on ceftriaxone 1 g IV qd, flagyl 500 mg IV q8h  -tylenol 650 mg q6h standing for pain  -monitor for bloody BMs  -[facility] [facility]  -Attempted to contact PCP- Dr. Jeff Corral- [facility]   [phone]  -PCP office will fax records to [facility] 3/11 -Pt. reporting chronic abdominal pain - 8-9/10 in severity, abdomen tender to palpation throughout, especially in the RLQ, ND; no rebound or guarding  -CT A/P- Sigmoid diverticulosis with mild mural thickening, differential includes chronic mural hypertrophy or mild colitis/diverticulitis in the appropriate clinical setting. Severe aortobiiliac calcific atherosclerosis. Focal fusiform aortic aneurysm above celiac axis 3.6 cm.  -Abd XR- No abnormal bowel dilatation or pneumoperitoneum. Lumbar spine degenerative changes, dextroscoliosis. Abdominal and pelvic vascular calcification.. Heart size within normal limits, thoracic aortic calcification. Lungs and mediastinum are unremarkable. Stable bony structures. Elevation of the left hemidiaphragm.  -Concern for possible diverticulitis- started on ceftriaxone 1 g IV qd, flagyl 500 mg IV q8h for 7 day course (day 5/7- last day 3/17)  -tylenol 650 mg q6h standing for pain  -monitor for bloody BMs  -med rec obtained from [facility]- [address]  -Attempted to contact PCP- Dr. Jeff Corral- [facility]   [phone]  -PCP office will fax records to [facility] 3/11

## 2021-03-15 NOTE — PROGRESS NOTE ADULT - ASSESSMENT
per Internal Medicine    66 yo M lives in shelter [facility] HIV (unclear if patient takes medications), EtOH abuse last about 2 weeks ago), substance abuse of cocaine and cannabis), HTN, MI in past, CVA x2 (residual right sided weakness), epilepsy presents after reported seizure, with abdominal pain, being treated for diverticulitis on IV abx. Epilepsy/HIV (Dr. Ayala/Mayte) consulted.     Problem/Plan - 1:  ·  Problem: Abdominal pain.  Plan: -Pt. reporting chronic abdominal pain - 8-9/10 in severity, abdomen tender to palpation throughout, especially in the RLQ, ND; no rebound or guarding  -CT A/P- Sigmoid diverticulosis with mild mural thickening, differential includes chronic mural hypertrophy or mild colitis/diverticulitis in the appropriate clinical setting. Severe aortobiiliac calcific atherosclerosis. Focal fusiform aortic aneurysm above celiac axis 3.6 cm.  -Abd XR- No abnormal bowel dilatation or pneumoperitoneum. Lumbar spine degenerative changes, dextroscoliosis. Abdominal and pelvic vascular calcification.. Heart size within normal limits, thoracic aortic calcification. Lungs and mediastinum are unremarkable. Stable bony structures. Elevation of the left hemidiaphragm.  -Concern for possible diverticulitis- started on ceftriaxone 1 g IV qd, flagyl 500 mg IV q8h for 7 day course (day 5/7- last day 3/17)  -tylenol 650 mg q6h standing for pain  -monitor for bloody BMs  -med rec obtained from [facility]  -Attempted to contact PCP- Dr. Jeff Corral- [facility]   [phone]  -PCP office will fax records to [facility] 3/11.    Problem/Plan - 2:  ·  Problem: Seizures.  Plan: Patient with a longstanding history of seizures, is supposed to take Keppra and reports he takes 'two two times a day', however reports he has not been taking his medications for the past 2 weeks. Given 1000 mg Keppra in the ED.   - vEEG ordered  - Keppra level as add on lab  - Started on keppra 500 mg PO BID- D/juan j keppra on 3/10-> switched to depakote 1000 mg ER qhs and ordered for depakote level.  -Pt. 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  -EKG- NSR  -trop negative.     Problem/Plan - 3:  ·  Problem: HIV (human immunodeficiency virus infection).  Plan: Patient with a history of known HIV, reports he takes no medications at home for HIV.  - f/u T cell count, viral load count -CD4 count 37, viral load 32k this admission  - HIV consulted-. Started on biktarvy and atovaquone (takes bactrim outpt. but has VINAY vs CKD of Scr 1.5) as per Dr. Ayala, HIV doc. Attempted to call PCP.     Problem/Plan - 4:  ·  Problem: Pancytopenia.  Plan: Patient with thrombopenia, leukopenia, anemia- pancytopenic. Possible causes of thrombocytopenia include EtOH abuse vs HIV infection.   - Trend CBC  -no signs of bleeding carol.     Problem/Plan - 5:  ·  Problem: VINAY (acute kidney injury).  Plan: unclear if VINAY vs CKD (pt w/ hx. of HTN, and nonadherence to medications)  -Scr of 1.5 on admission, now downtrending to 1.42 on 3/15  -f/u urine sodium, urine creatinine  -trend CMP  -avoid nephrotoxic medications.    Problem/Plan - 6:  Problem: Diabetes type 2, uncontrolled. Plan: Patient with a history of DM type 2, reports he takes no medications for this at home.  - HgA1c 5.4- well controlled   - mISS for now, no need to start nutritional or basal insulin yet.    Problem/Plan - 7:  ·  Problem: Essential hypertension.  Plan: Patient with a history of hypertension. Reports he takes 'a blood pressure pill' at home 'sometimes'. Does not check ambulatory BPs. Patient pressure on admission is hypertensive to 162/102.  - Started amlodipine 5 mg PO Qd for now- increase as needed.    Problem/Plan - 8:  ·  Problem: Polysubstance abuse.  Plan: Patient reports he abuses crack cocaine and marijuana. As per patient he was planning to go to drug rehabilitation center today, however had seizure prior to presentation to clinic. UTox positive for cocaine metabolites and cannabinoid metabolites.  - Continue to monitor, NTD at this time.     Problem/Plan - 9:  ·  Problem: Need for prophylactic measure.  Plan: DVT ppx - Lovenox  GI ppx - Pantoprazole 40 mg PO Qd.     Problem/Plan - 10:  Problem: Nutrition, metabolism, and development symptoms. Plan; F - none  E - replete PRN  N - diabetic dash/tlc  dvt ppx- holding in setting of thrombocytopenia   A - RMF.

## 2021-03-15 NOTE — PROCEDURE NOTE - NSPROCDETAILS_GEN_ALL_CORE
Us guidance/blood seen on insertion/dressing applied/flushes easily/secured in place/sterile technique, catheter placed

## 2021-03-15 NOTE — DISCHARGE NOTE PROVIDER - CARE PROVIDER_API CALL
LISA THOMAS  [id number]  [address]  [place] [address]  Phone: [phone]  Fax: [phone]  Established Patient  Scheduled Appointment: 03/24/2021 03:00 PM   LISA THOMAS  [id number]  [address]  [place] [address]  Phone: ([phone]  Fax: ()-  Established Patient  Scheduled Appointment: 03/24/2021 03:00 PM    Marita Ayala)  Internal Medicine  [address] [place] [id number]  Phone: [phone]  Fax: [phone]  Scheduled Appointment: 03/23/2021 12:00 PM

## 2021-03-15 NOTE — DISCHARGE NOTE PROVIDER - NSDCFUADDAPPT_GEN_ALL_CORE_FT
Please follow-up with Dr. Escalona on March 24 at 3 PM. Please call [phone] for any questions or concerns regarding appointment time.

## 2021-03-16 LAB
LEVETIRACETAM SERPL-MCNC: 30.4 UG/ML — SIGNIFICANT CHANGE UP (ref 10–40)
SARS-COV-2 RNA SPEC QL NAA+PROBE: SIGNIFICANT CHANGE UP

## 2021-03-16 PROCEDURE — 99232 SBSQ HOSP IP/OBS MODERATE 35: CPT

## 2021-03-16 RX ADMIN — CEFTRIAXONE 100 MILLIGRAM(S): 500 INJECTION, POWDER, FOR SOLUTION INTRAMUSCULAR; INTRAVENOUS at 12:01

## 2021-03-16 RX ADMIN — BICTEGRAVIR SODIUM, EMTRICITABINE, AND TENOFOVIR ALAFENAMIDE FUMARATE 1 TABLET(S): 30; 120; 15 TABLET ORAL at 12:01

## 2021-03-16 RX ADMIN — Medication 100 MILLIGRAM(S): at 14:04

## 2021-03-16 RX ADMIN — LIDOCAINE 1 PATCH: 4 CREAM TOPICAL at 00:21

## 2021-03-16 RX ADMIN — AMLODIPINE BESYLATE 5 MILLIGRAM(S): 2.5 TABLET ORAL at 05:58

## 2021-03-16 RX ADMIN — Medication 650 MILLIGRAM(S): at 06:01

## 2021-03-16 RX ADMIN — DIVALPROEX SODIUM 1000 MILLIGRAM(S): 500 TABLET, DELAYED RELEASE ORAL at 00:03

## 2021-03-16 RX ADMIN — Medication 650 MILLIGRAM(S): at 00:21

## 2021-03-16 RX ADMIN — ATORVASTATIN CALCIUM 40 MILLIGRAM(S): 80 TABLET, FILM COATED ORAL at 00:03

## 2021-03-16 RX ADMIN — Medication 100 MILLIGRAM(S): at 00:04

## 2021-03-16 RX ADMIN — Medication 100 MILLIGRAM(S): at 07:45

## 2021-03-16 RX ADMIN — LIDOCAINE 1 PATCH: 4 CREAM TOPICAL at 17:15

## 2021-03-16 RX ADMIN — LIDOCAINE 1 PATCH: 4 CREAM TOPICAL at 12:01

## 2021-03-16 RX ADMIN — Medication 100 MILLIGRAM(S): at 22:05

## 2021-03-16 RX ADMIN — Medication 650 MILLIGRAM(S): at 05:57

## 2021-03-16 RX ADMIN — Medication 81 MILLIGRAM(S): at 12:01

## 2021-03-16 RX ADMIN — PANTOPRAZOLE SODIUM 40 MILLIGRAM(S): 20 TABLET, DELAYED RELEASE ORAL at 05:57

## 2021-03-16 NOTE — PROGRESS NOTE ADULT - PROBLEM SELECTOR PLAN 5
unclear if VINAY vs CKD (pt w/ hx. of HTN, and nonadherence to medications)  -Scr of 1.5 on admission, now downtrending to 1.42 on 3/15  -f/u urine sodium, urine creatinine  -trend CMP  -avoid nephrotoxic medications [resolving] unclear if VINAY vs CKD (pt w/ hx. of HTN, and nonadherence to medications)  -Scr of 1.5 on admission, now downtrending to 1.42 on 3/15  -f/u urine sodium, urine creatinine  -trend CMP  -avoid nephrotoxic medications

## 2021-03-16 NOTE — CHART NOTE - NSCHARTNOTEFT_GEN_A_CORE
O/N Events: MARLY    Subjective/ROS: Tender abdomen, diarrhea per patient.    VITALS  Vital Signs Last 24 Hrs  T(C): 36.9 (16 Mar 2021 13:00), Max: 37.2 (15 Mar 2021 21:03)  T(F): 98.4 (16 Mar 2021 13:00), Max: 99 (15 Mar 2021 21:03)  HR: 93 (16 Mar 2021 14:30) (80 - 94)  BP: 134/84 (16 Mar 2021 14:30) (122/73 - 137/83)  BP(mean): --  RR: 17 (16 Mar 2021 13:00) (17 - 18)  SpO2: 98% (16 Mar 2021 13:00) (98% - 100%)      PHYSICAL EXAM  General: A&Ox3; NAD; cachectic  Head: NC/AT; MMM; PERRL; EOMI; no thrush  Neck: Supple; no JVD, no LAD noted  Respiratory: CTA B/L; no wheezes/crackles   Cardiovascular: Regular rhythm/rate; S1/S2   Gastrointestinal: Soft; tender to palpation; normoactive BS  Extremities: WWP; no edema/cyanosis;   Neurological:  CNII-XII grossly intact; tremor at rest in hands, decreased strength on R side    MEDICATIONS  (STANDING):  acetaminophen   Tablet .. 650 milliGRAM(s) Oral every 6 hours  amLODIPine   Tablet 5 milliGRAM(s) Oral daily  aspirin  chewable 81 milliGRAM(s) Oral daily  atorvastatin 40 milliGRAM(s) Oral at bedtime  atovaquone  Suspension 1500 milliGRAM(s) Oral daily  bictegravir 50 mG/emtricitabine 200 mG/tenofovir alafenamide 25 mG (BIKTARVY) 1 Tablet(s) Oral daily  dextrose 40% Gel 15 Gram(s) Oral once  dextrose 5%. 1000 milliLiter(s) (50 mL/Hr) IV Continuous <Continuous>  dextrose 5%. 1000 milliLiter(s) (100 mL/Hr) IV Continuous <Continuous>  dextrose 50% Injectable 25 Gram(s) IV Push once  dextrose 50% Injectable 12.5 Gram(s) IV Push once  dextrose 50% Injectable 25 Gram(s) IV Push once  diVALproex ER 1000 milliGRAM(s) Oral at bedtime  glucagon  Injectable 1 milliGRAM(s) IntraMuscular once  insulin lispro (ADMELOG) corrective regimen sliding scale   SubCutaneous Before meals and at bedtime  lidocaine   Patch 1 Patch Transdermal daily  metroNIDAZOLE  IVPB 500 milliGRAM(s) IV Intermittent every 8 hours  pantoprazole    Tablet 40 milliGRAM(s) Oral before breakfast    MEDICATIONS  (PRN):  benzocaine 15 mG/menthol 3.6 mG (Sugar-Free) Lozenge 1 Lozenge Oral four times a day PRN Sore Throat  LORazepam   Injectable 2 milliGRAM(s) IV Push once PRN Seizure activity      No Known Allergies      LABS: Reviewed    IMAGING/EKG/ETC: Reviewed    65M w/ AIDS presents w/ reported seizure and possible diverticulitis.      AIDS: Stable on biktarvy.  Diarrhea improved.  Patient still at high risk for IRIS, will need outpatient follow up.  If patient's diarrhea worsens, would re-send GI PCR and CMV PCR to assess for OI as cause of diarrhea, but given improvement on abx, repeat testing would likely have low yield.  C/w biktarvy and atovaquone.      HIV team to sign off.  Please re-consult with additional questions.    Discussed w/ 7U1 team.

## 2021-03-16 NOTE — PROGRESS NOTE ADULT - PROBLEM SELECTOR PLAN 7
Patient with a history of hypertension. Reports he takes 'a blood pressure pill' at home 'sometimes'. Does not check ambulatory BPs. Patient pressure on admission is hypertensive to 162/102.  - Started amlodipine 5 mg PO Qd for now- increase as needed

## 2021-03-16 NOTE — PROGRESS NOTE ADULT - PROBLEM SELECTOR PLAN 1
-Pt. reporting chronic abdominal pain - 8-9/10 in severity, abdomen tender to palpation throughout, especially in the RLQ, ND; no rebound or guarding  -CT A/P- Sigmoid diverticulosis with mild mural thickening, differential includes chronic mural hypertrophy or mild colitis/diverticulitis in the appropriate clinical setting. Severe aortobiiliac calcific atherosclerosis. Focal fusiform aortic aneurysm above celiac axis 3.6 cm.  -Abd XR- No abnormal bowel dilatation or pneumoperitoneum. Lumbar spine degenerative changes, dextroscoliosis. Abdominal and pelvic vascular calcification.. Heart size within normal limits, thoracic aortic calcification. Lungs and mediastinum are unremarkable. Stable bony structures. Elevation of the left hemidiaphragm.  -Concern for possible diverticulitis- started on ceftriaxone 1 g IV qd, flagyl 500 mg IV q8h for 7 day course (day 5/7- last day 3/17)  -tylenol 650 mg q6h standing for pain  -monitor for bloody BMs  -med rec obtained from [facility]- [address]  -Attempted to contact PCP- Dr. Jeff Corral- [facility]   [phone]  -PCP office will fax records to [facility] 3/11 -Pt. reporting chronic abdominal pain - 8-9/10 in severity, abdomen tender to palpation throughout, especially in the RLQ, ND; no rebound or guarding  -CT A/P- Sigmoid diverticulosis with mild mural thickening, differential includes chronic mural hypertrophy or mild colitis/diverticulitis in the appropriate clinical setting. Severe aortobiiliac calcific atherosclerosis. Focal fusiform aortic aneurysm above celiac axis 3.6 cm.  -Abd XR- No abnormal bowel dilatation or pneumoperitoneum. Lumbar spine degenerative changes, dextroscoliosis. Abdominal and pelvic vascular calcification.. Heart size within normal limits, thoracic aortic calcification. Lungs and mediastinum are unremarkable. Stable bony structures. Elevation of the left hemidiaphragm.  -Concern for possible diverticulitis- started on ceftriaxone 1 g IV qd, flagyl 500 mg IV q8h for 7 day course (day 5/7- last day 3/17)  -tylenol 650 mg q6h standing for pain  -monitor for bloody BMs  -[facility] [address] [facility]  -Attempted to contact PCP- Dr. Jeff Corral- [facility]   [phone]  -attempted to reach PCP office to fax records to [facility] 3/11

## 2021-03-16 NOTE — PROGRESS NOTE ADULT - ATTENDING COMMENTS
Dispo: CHARITY panchal
Dispo: CHARITY panchal
pt seen and examined by me  appear well  no new events    1- Sigmoid diverticulitis- acute c/w abx  2- Seizure- POA-  now resolved  c/w antiepileptics  3- Amado- now resolved  4- Pancytopenia - likely 2/2 HIV  5- HIV/AIDS
Pt. seen and examined by me earlier today; I have read Dr. Matthews's note, I agree w/ his findings and plan of care as documented
pt seen and examined by me case d/w housestaff agree with VS, PE, assessment and plan as above with following additives    1- Seizure- POA- started on antiepileptics  no further seizure activity  2- Sigmoid diverticulitis- on abx  3- Pancytopenia-2/2 HIV  4- HIV/AIDS on antiretroviral  5-polysubstance abuse
65M shelter resident, h/o epilepsy on keppra, CVA w R sided weakness, HIV, HTN, polysubstance abuse (EtOH 2wk ago; UDS positive for cocaine and THC) pending transfer to substance use rehab, HTN, CAD, p/w seizure activity, CTH nml but possible medication non-adherence, found to have pancytopenia, diverticulitis, CKD3    Pt refused vEEG - however reports unwitnessed 'sz episode' overnight. Discussed importance of vEEG monitoring for events to help w such episodes and to guide treatement w patient. Endorsing decreased appetite today, abd pain, in addition to weakness. Discussed w CM - has not been able to get into contact w  Shantal or her supervisor despite multiple attempts. Also inpatient substance abuse ctr pt reports he was to be admitted to states they were not aware of pt.   --vEEG; f/u epilepsy recs - started on depakote  --f/u HIV recs - CD4 noted to be 37; on atovaquone in setting of thrombocytopenia. anti-retrovirals restarted   --PT eval  --Serial abd exam - currently benign. May need to decrease to clear liquids  --Continue w CTX+Flagyl  --Obtain DC summary from [facility]  DISPO: TBD
65M shelter resident, h/o epilepsy on keppra, CVA w R sided weakness, HIV, HTN, polysubstance abuse (EtOH 2wk ago; UDS positive for cocaine and THC) pending transfer to substance use rehab, HTN, CAD, p/w seizure activity, CTH nml but possible medication non-adherence, found to have pancytopenia, diverticulitis, CKD3  Pt endorsing diffuse abd pain this AM. No nausea or emesis and also endorsing hunger. States abd pain has been longstanding. Exam shows soft abd, non-distended, hypoactive BS, tender to palpation diffusely wo rebound or guarding.    --c/s Epilepsy for reported sz events; f/u vEEG  --CTX+Flagyl for possible diverticulitis. Pt endorsing good appetite and RN reported brown stools this AM  --Monitor Hgb; holding VTE PPx in setting of thrombocytopenia. No evidence of bleeding at this time  --HIV C/S; CD4, VL  --obtain med rec  --appreciate CM/SW assistance in disposition to substance use rehab where pt was going prior to admission. Pt gave  name who also witnessed sz  DISPO: FREDERICK

## 2021-03-16 NOTE — PROGRESS NOTE ADULT - PROBLEM SELECTOR PLAN 3
Patient with a history of known HIV, reports he takes no medications at home for HIV.  - f/u T cell count, viral load count -CD4 count 37, viral load 32k this admission  - HIV consulted-. Started on biktarvy and atovaquone (takes bactrim outpt. but has VINAY vs CKD of Scr 1.5) as per Dr. Ayala, HIV doc. Attempted to call PCP. Patient with a history of known HIV, reports he takes no medications at home for HIV.  - f/u T cell count, viral load count -CD4 count 37, viral load 32k this admission  - HIV consulted-. Started on biktarvy and atovaquone (takes bactrim outpt. but has VINAY vs CKD of Scr 1.5) as per Dr. Ayala, HIV doc.   -Cryptococcal antigen negative   -Attempted to call PCP.

## 2021-03-16 NOTE — PROGRESS NOTE ADULT - SUBJECTIVE AND OBJECTIVE BOX
INTERVAL HPI/OVERNIGHT EVENTS:    SUBJECTIVE: Patient seen and examined at bedside.    OBJECTIVE:    VITAL SIGNS:  ICU Vital Signs Last 24 Hrs  T(C): 36.8 (16 Mar 2021 05:56), Max: 37.2 (15 Mar 2021 21:03)  T(F): 98.3 (16 Mar 2021 05:56), Max: 99 (15 Mar 2021 21:03)  HR: 94 (16 Mar 2021 05:56) (80 - 94)  BP: 122/73 (16 Mar 2021 05:56) (122/73 - 130/81)  BP(mean): --  ABP: --  ABP(mean): --  RR: 18 (16 Mar 2021 05:56) (17 - 18)  SpO2: 99% (16 Mar 2021 05:56) (99% - 100%)        03-15 @ 07:01  -  03-16 @ 07:00  --------------------------------------------------------  IN: 0 mL / OUT: 700 mL / NET: -700 mL      CAPILLARY BLOOD GLUCOSE      POCT Blood Glucose.: 103 mg/dL (14 Mar 2021 11:40)      PHYSICAL EXAM:        MEDICATIONS:  MEDICATIONS  (STANDING):  acetaminophen   Tablet .. 650 milliGRAM(s) Oral every 6 hours  amLODIPine   Tablet 5 milliGRAM(s) Oral daily  aspirin  chewable 81 milliGRAM(s) Oral daily  atorvastatin 40 milliGRAM(s) Oral at bedtime  atovaquone  Suspension 1500 milliGRAM(s) Oral daily  bictegravir 50 mG/emtricitabine 200 mG/tenofovir alafenamide 25 mG (BIKTARVY) 1 Tablet(s) Oral daily  cefTRIAXone   IVPB 1000 milliGRAM(s) IV Intermittent every 24 hours  dextrose 40% Gel 15 Gram(s) Oral once  dextrose 5%. 1000 milliLiter(s) (50 mL/Hr) IV Continuous <Continuous>  dextrose 5%. 1000 milliLiter(s) (100 mL/Hr) IV Continuous <Continuous>  dextrose 50% Injectable 25 Gram(s) IV Push once  dextrose 50% Injectable 12.5 Gram(s) IV Push once  dextrose 50% Injectable 25 Gram(s) IV Push once  diVALproex ER 1000 milliGRAM(s) Oral at bedtime  glucagon  Injectable 1 milliGRAM(s) IntraMuscular once  insulin lispro (ADMELOG) corrective regimen sliding scale   SubCutaneous Before meals and at bedtime  lidocaine   Patch 1 Patch Transdermal daily  metroNIDAZOLE  IVPB 500 milliGRAM(s) IV Intermittent every 8 hours  pantoprazole    Tablet 40 milliGRAM(s) Oral before breakfast    MEDICATIONS  (PRN):  benzocaine 15 mG/menthol 3.6 mG (Sugar-Free) Lozenge 1 Lozenge Oral four times a day PRN Sore Throat  LORazepam   Injectable 2 milliGRAM(s) IV Push once PRN Seizure activity      ALLERGIES:  Allergies    No Known Allergies    Intolerances        LABS:                RADIOLOGY & ADDITIONAL TESTS: Reviewed. INTERVAL HPI/OVERNIGHT EVENTS:  Patient's IV stopped working. Patient refusing IV replacement and PM FSG, Depakote, Flagyl, and lipitor from nurse. After speaking with patient at bedside, he agreed to have his IV replaced and took his PM meds.    SUBJECTIVE: Patient seen and examined at bedside. Pt. reports stomach pain is 9/10 today. Notes minimal SOB. He denies chest pain, headache, nausea, vomiting, fever, chills, leg pain, urinary changes.    OBJECTIVE:    VITAL SIGNS:  ICU Vital Signs Last 24 Hrs  T(C): 36.8 (16 Mar 2021 05:56), Max: 37.2 (15 Mar 2021 21:03)  T(F): 98.3 (16 Mar 2021 05:56), Max: 99 (15 Mar 2021 21:03)  HR: 94 (16 Mar 2021 05:56) (80 - 94)  BP: 122/73 (16 Mar 2021 05:56) (122/73 - 130/81)  BP(mean): --  ABP: --  ABP(mean): --  RR: 18 (16 Mar 2021 05:56) (17 - 18)  SpO2: 99% (16 Mar 2021 05:56) (99% - 100%)        03-15 @ 07:01  -  03-16 @ 07:00  --------------------------------------------------------  IN: 0 mL / OUT: 700 mL / NET: -700 mL      CAPILLARY BLOOD GLUCOSE      POCT Blood Glucose.: 103 mg/dL (14 Mar 2021 11:40)      PHYSICAL EXAM:  Constitutional: A&OX3  Head: NC/AT  Eyes: PERRL, EOMI, anicteric sclera  ENT: MMM  Neck: supple; no JVD or thyromegaly  Respiratory: CTA B/L; no W/R/R   Cardiac: +S1/S2; tachycardic, regular rhythm; no M/R/G  Gastrointestinal: abdomen tender to palpation throughout, especially in the RLQ, ND; no rebound or guarding  Extremities: WWP, no clubbing or cyanosis; no peripheral edema  Musculoskeletal:  resting tremor in bilateral hands, 1-2/5 strength in RUE, 1-2/5 strength in RLE , 5/5 in LUE/LLE  Neurologic: AAOx3; CNII-XII grossly intact.        MEDICATIONS:  MEDICATIONS  (STANDING):  acetaminophen   Tablet .. 650 milliGRAM(s) Oral every 6 hours  amLODIPine   Tablet 5 milliGRAM(s) Oral daily  aspirin  chewable 81 milliGRAM(s) Oral daily  atorvastatin 40 milliGRAM(s) Oral at bedtime  atovaquone  Suspension 1500 milliGRAM(s) Oral daily  bictegravir 50 mG/emtricitabine 200 mG/tenofovir alafenamide 25 mG (BIKTARVY) 1 Tablet(s) Oral daily  cefTRIAXone   IVPB 1000 milliGRAM(s) IV Intermittent every 24 hours  dextrose 40% Gel 15 Gram(s) Oral once  dextrose 5%. 1000 milliLiter(s) (50 mL/Hr) IV Continuous <Continuous>  dextrose 5%. 1000 milliLiter(s) (100 mL/Hr) IV Continuous <Continuous>  dextrose 50% Injectable 25 Gram(s) IV Push once  dextrose 50% Injectable 12.5 Gram(s) IV Push once  dextrose 50% Injectable 25 Gram(s) IV Push once  diVALproex ER 1000 milliGRAM(s) Oral at bedtime  glucagon  Injectable 1 milliGRAM(s) IntraMuscular once  insulin lispro (ADMELOG) corrective regimen sliding scale   SubCutaneous Before meals and at bedtime  lidocaine   Patch 1 Patch Transdermal daily  metroNIDAZOLE  IVPB 500 milliGRAM(s) IV Intermittent every 8 hours  pantoprazole    Tablet 40 milliGRAM(s) Oral before breakfast    MEDICATIONS  (PRN):  benzocaine 15 mG/menthol 3.6 mG (Sugar-Free) Lozenge 1 Lozenge Oral four times a day PRN Sore Throat  LORazepam   Injectable 2 milliGRAM(s) IV Push once PRN Seizure activity      ALLERGIES:  Allergies    No Known Allergies    Intolerances        LABS:                RADIOLOGY & ADDITIONAL TESTS: Reviewed.

## 2021-03-16 NOTE — PROGRESS NOTE ADULT - PROBLEM SELECTOR PLAN 10
F - none  E - replete PRN  N - diabetic dash/tlc  dvt ppx- holding in setting of thrombocytopenia   A - RMF F - none  E - replete PRN  N - diabetic dash/tlc  dvt ppx- holding in setting of thrombocytopenia   A - RMF-> recs for CHARITY

## 2021-03-16 NOTE — PROGRESS NOTE ADULT - PROBLEM SELECTOR PLAN 2
Patient with a longstanding history of seizures, is supposed to take Keppra and reports he takes 'two two times a day', however reports he has not been taking his medications for the past 2 weeks. Given 1000 mg Keppra in the ED.   - vEEG ordered  - Keppra level as add on lab  - Started on keppra 500 mg PO BID- D/juan j keppra on 3/10-> switched to depakote 1000 mg ER qhs and ordered for depakote level.  -Pt. 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  -EKG- NSR  -trop negative Patient with a longstanding history of seizures, is supposed to take Keppra and reports he takes 'two two times a day', however reports he has not been taking his medications for the past 2 weeks. Given 1000 mg Keppra in the ED.   - vEEG ordered- pt. has been intermittently refusing   - Keppra level as add on lab  - Started on keppra 500 mg PO BID- D/juan j keppra on 3/10-> switched to depakote 1000 mg ER qhs and ordered for depakote level.  -Pt. refusing vEEG monitoring intermittently despite stressing importance to rule out seizure activity  - ativan 2 mg IV PRN for seizure activity    #CVA  -CVA x2 with residual right sided weakness- 1-2/5 strength in RUE, 1-2/5 strength in RLE   -atorvastatin/aspirin started 3/11  -f/u PT/OT recs- CHARITY     #Chest pain  -likely MSK in origin- reporting 9/10 chest pain- as if someone has punched him- in the sternum, nonradiating, localized, reproducible on palpation, no diaphoresis, radiation to arms.  -EKG- NSR  -trop negative

## 2021-03-17 ENCOUNTER — TRANSCRIPTION ENCOUNTER (OUTPATIENT)
Age: 66
End: 2021-03-17

## 2021-03-17 VITALS
RESPIRATION RATE: 19 BRPM | SYSTOLIC BLOOD PRESSURE: 118 MMHG | OXYGEN SATURATION: 97 % | TEMPERATURE: 98 F | HEART RATE: 103 BPM | DIASTOLIC BLOOD PRESSURE: 69 MMHG

## 2021-03-17 PROCEDURE — 74176 CT ABD & PELVIS W/O CONTRAST: CPT

## 2021-03-17 PROCEDURE — 86359 T CELLS TOTAL COUNT: CPT

## 2021-03-17 PROCEDURE — 93005 ELECTROCARDIOGRAM TRACING: CPT

## 2021-03-17 PROCEDURE — 84100 ASSAY OF PHOSPHORUS: CPT

## 2021-03-17 PROCEDURE — 87536 HIV-1 QUANT&REVRSE TRNSCRPJ: CPT

## 2021-03-17 PROCEDURE — 80177 DRUG SCRN QUAN LEVETIRACETAM: CPT

## 2021-03-17 PROCEDURE — 74019 RADEX ABDOMEN 2 VIEWS: CPT

## 2021-03-17 PROCEDURE — 97530 THERAPEUTIC ACTIVITIES: CPT

## 2021-03-17 PROCEDURE — 97535 SELF CARE MNGMENT TRAINING: CPT

## 2021-03-17 PROCEDURE — 85025 COMPLETE CBC W/AUTO DIFF WBC: CPT

## 2021-03-17 PROCEDURE — 83036 HEMOGLOBIN GLYCOSYLATED A1C: CPT

## 2021-03-17 PROCEDURE — 82962 GLUCOSE BLOOD TEST: CPT

## 2021-03-17 PROCEDURE — 80164 ASSAY DIPROPYLACETIC ACD TOT: CPT

## 2021-03-17 PROCEDURE — 86357 NK CELLS TOTAL COUNT: CPT

## 2021-03-17 PROCEDURE — 97161 PT EVAL LOW COMPLEX 20 MIN: CPT

## 2021-03-17 PROCEDURE — U0003: CPT

## 2021-03-17 PROCEDURE — 80307 DRUG TEST PRSMV CHEM ANLYZR: CPT

## 2021-03-17 PROCEDURE — 71045 X-RAY EXAM CHEST 1 VIEW: CPT

## 2021-03-17 PROCEDURE — 99285 EMERGENCY DEPT VISIT HI MDM: CPT | Mod: 25

## 2021-03-17 PROCEDURE — 83605 ASSAY OF LACTIC ACID: CPT

## 2021-03-17 PROCEDURE — 86355 B CELLS TOTAL COUNT: CPT

## 2021-03-17 PROCEDURE — 95700 EEG CONT REC W/VID EEG TECH: CPT

## 2021-03-17 PROCEDURE — 86803 HEPATITIS C AB TEST: CPT

## 2021-03-17 PROCEDURE — 99239 HOSP IP/OBS DSCHRG MGMT >30: CPT | Mod: GC

## 2021-03-17 PROCEDURE — 85610 PROTHROMBIN TIME: CPT

## 2021-03-17 PROCEDURE — 36415 COLL VENOUS BLD VENIPUNCTURE: CPT

## 2021-03-17 PROCEDURE — 83690 ASSAY OF LIPASE: CPT

## 2021-03-17 PROCEDURE — 86360 T CELL ABSOLUTE COUNT/RATIO: CPT

## 2021-03-17 PROCEDURE — 96374 THER/PROPH/DIAG INJ IV PUSH: CPT

## 2021-03-17 PROCEDURE — 84484 ASSAY OF TROPONIN QUANT: CPT

## 2021-03-17 PROCEDURE — U0005: CPT

## 2021-03-17 PROCEDURE — 86403 PARTICLE AGGLUT ANTBDY SCRN: CPT

## 2021-03-17 PROCEDURE — 80053 COMPREHEN METABOLIC PANEL: CPT

## 2021-03-17 PROCEDURE — 83735 ASSAY OF MAGNESIUM: CPT

## 2021-03-17 PROCEDURE — 70450 CT HEAD/BRAIN W/O DYE: CPT

## 2021-03-17 PROCEDURE — 97116 GAIT TRAINING THERAPY: CPT

## 2021-03-17 PROCEDURE — 81001 URINALYSIS AUTO W/SCOPE: CPT

## 2021-03-17 PROCEDURE — 95714 VEEG EA 12-26 HR UNMNTR: CPT

## 2021-03-17 RX ORDER — DIVALPROEX SODIUM 500 MG/1
2 TABLET, DELAYED RELEASE ORAL
Qty: 0 | Refills: 0 | DISCHARGE
Start: 2021-03-17

## 2021-03-17 RX ORDER — AZTREONAM 2 G
1 VIAL (EA) INJECTION
Qty: 0 | Refills: 0 | DISCHARGE

## 2021-03-17 RX ORDER — PANTOPRAZOLE SODIUM 20 MG/1
1 TABLET, DELAYED RELEASE ORAL
Qty: 0 | Refills: 0 | DISCHARGE
Start: 2021-03-17

## 2021-03-17 RX ORDER — FOLIC ACID 0.8 MG
1 TABLET ORAL
Qty: 0 | Refills: 0 | DISCHARGE

## 2021-03-17 RX ORDER — CHOLECALCIFEROL (VITAMIN D3) 125 MCG
1 CAPSULE ORAL
Qty: 0 | Refills: 0 | DISCHARGE

## 2021-03-17 RX ORDER — AMLODIPINE BESYLATE 2.5 MG/1
1 TABLET ORAL
Qty: 0 | Refills: 0 | DISCHARGE
Start: 2021-03-17

## 2021-03-17 RX ORDER — ATOVAQUONE 750 MG/5ML
10 SUSPENSION ORAL
Qty: 0 | Refills: 0 | DISCHARGE
Start: 2021-03-17

## 2021-03-17 RX ORDER — FAMOTIDINE 10 MG/ML
1 INJECTION INTRAVENOUS
Qty: 0 | Refills: 0 | DISCHARGE

## 2021-03-17 RX ORDER — BENZOCAINE AND MENTHOL 5; 1 G/100ML; G/100ML
0 LIQUID ORAL
Qty: 0 | Refills: 0 | DISCHARGE
Start: 2021-03-17

## 2021-03-17 RX ORDER — PREGABALIN 225 MG/1
1 CAPSULE ORAL
Qty: 0 | Refills: 0 | DISCHARGE

## 2021-03-17 RX ORDER — LORATADINE 10 MG/1
1 TABLET ORAL
Qty: 0 | Refills: 0 | DISCHARGE

## 2021-03-17 RX ORDER — ATORVASTATIN CALCIUM 80 MG/1
1 TABLET, FILM COATED ORAL
Qty: 0 | Refills: 0 | DISCHARGE
Start: 2021-03-17

## 2021-03-17 RX ORDER — GABAPENTIN 400 MG/1
1 CAPSULE ORAL
Qty: 0 | Refills: 0 | DISCHARGE

## 2021-03-17 RX ORDER — ASPIRIN/CALCIUM CARB/MAGNESIUM 324 MG
1 TABLET ORAL
Qty: 0 | Refills: 0 | DISCHARGE

## 2021-03-17 RX ORDER — BICTEGRAVIR SODIUM, EMTRICITABINE, AND TENOFOVIR ALAFENAMIDE FUMARATE 30; 120; 15 MG/1; MG/1; MG/1
2 TABLET ORAL
Qty: 0 | Refills: 0 | DISCHARGE

## 2021-03-17 RX ORDER — FERROUS SULFATE 325(65) MG
1 TABLET ORAL
Qty: 0 | Refills: 0 | DISCHARGE

## 2021-03-17 RX ORDER — ALBUTEROL 90 UG/1
2 AEROSOL, METERED ORAL
Qty: 0 | Refills: 0 | DISCHARGE

## 2021-03-17 RX ADMIN — Medication 650 MILLIGRAM(S): at 04:29

## 2021-03-17 RX ADMIN — ATOVAQUONE 1500 MILLIGRAM(S): 750 SUSPENSION ORAL at 11:28

## 2021-03-17 RX ADMIN — LIDOCAINE 1 PATCH: 4 CREAM TOPICAL at 00:46

## 2021-03-17 RX ADMIN — Medication 81 MILLIGRAM(S): at 11:28

## 2021-03-17 RX ADMIN — BICTEGRAVIR SODIUM, EMTRICITABINE, AND TENOFOVIR ALAFENAMIDE FUMARATE 1 TABLET(S): 30; 120; 15 TABLET ORAL at 11:28

## 2021-03-17 RX ADMIN — Medication 650 MILLIGRAM(S): at 12:22

## 2021-03-17 RX ADMIN — Medication 100 MILLIGRAM(S): at 07:37

## 2021-03-17 RX ADMIN — PANTOPRAZOLE SODIUM 40 MILLIGRAM(S): 20 TABLET, DELAYED RELEASE ORAL at 07:37

## 2021-03-17 RX ADMIN — AMLODIPINE BESYLATE 5 MILLIGRAM(S): 2.5 TABLET ORAL at 07:37

## 2021-03-17 RX ADMIN — Medication 650 MILLIGRAM(S): at 11:29

## 2021-03-17 RX ADMIN — Medication 100 MILLIGRAM(S): at 13:42

## 2021-03-17 RX ADMIN — Medication 650 MILLIGRAM(S): at 00:42

## 2021-03-17 RX ADMIN — Medication 650 MILLIGRAM(S): at 08:38

## 2021-03-17 RX ADMIN — Medication 650 MILLIGRAM(S): at 07:36

## 2021-03-17 NOTE — DISCHARGE NOTE NURSING/CASE MANAGEMENT/SOCIAL WORK - NSDCFUADDAPPT_GEN_ALL_CORE_FT
Please follow-up with Dr. Escalona on March 24 at 3 PM. Please call [phone] for any questions or concerns regarding appointment time.

## 2021-03-17 NOTE — DISCHARGE NOTE NURSING/CASE MANAGEMENT/SOCIAL WORK - PATIENT PORTAL LINK FT
You can access the FollowMyHealth Patient Portal offered by [facility] by registering at the following website: [email or web address]. By joining [facility]’s FollowMyHealth portal, you will also be able to view your health information using other applications (apps) compatible with our system.

## 2021-03-23 ENCOUNTER — APPOINTMENT (OUTPATIENT)
Dept: INFECTIOUS DISEASE | Facility: CLINIC | Age: 66
End: 2021-03-23

## 2021-03-23 ENCOUNTER — NON-APPOINTMENT (OUTPATIENT)
Age: 66
End: 2021-03-23

## 2021-03-25 DIAGNOSIS — F14.10 COCAINE ABUSE, UNCOMPLICATED: ICD-10-CM

## 2021-03-25 DIAGNOSIS — K57.32 DIVERTICULITIS OF LARGE INTESTINE WITHOUT PERFORATION OR ABSCESS WITHOUT BLEEDING: ICD-10-CM

## 2021-03-25 DIAGNOSIS — Z91.14 PATIENT'S OTHER NONCOMPLIANCE WITH MEDICATION REGIMEN: ICD-10-CM

## 2021-03-25 DIAGNOSIS — I71.4 ABDOMINAL AORTIC ANEURYSM, WITHOUT RUPTURE: ICD-10-CM

## 2021-03-25 DIAGNOSIS — R07.89 OTHER CHEST PAIN: ICD-10-CM

## 2021-03-25 DIAGNOSIS — I70.0 ATHEROSCLEROSIS OF AORTA: ICD-10-CM

## 2021-03-25 DIAGNOSIS — N18.9 CHRONIC KIDNEY DISEASE, UNSPECIFIED: ICD-10-CM

## 2021-03-25 DIAGNOSIS — I69.351 HEMIPLEGIA AND HEMIPARESIS FOLLOWING CEREBRAL INFARCTION AFFECTING RIGHT DOMINANT SIDE: ICD-10-CM

## 2021-03-25 DIAGNOSIS — I25.2 OLD MYOCARDIAL INFARCTION: ICD-10-CM

## 2021-03-25 DIAGNOSIS — I12.9 HYPERTENSIVE CHRONIC KIDNEY DISEASE WITH STAGE 1 THROUGH STAGE 4 CHRONIC KIDNEY DISEASE, OR UNSPECIFIED CHRONIC KIDNEY DISEASE: ICD-10-CM

## 2021-03-25 DIAGNOSIS — F17.210 NICOTINE DEPENDENCE, CIGARETTES, UNCOMPLICATED: ICD-10-CM

## 2021-03-25 DIAGNOSIS — Z59.0 HOMELESSNESS: ICD-10-CM

## 2021-03-25 DIAGNOSIS — G40.909 EPILEPSY, UNSPECIFIED, NOT INTRACTABLE, WITHOUT STATUS EPILEPTICUS: ICD-10-CM

## 2021-03-25 DIAGNOSIS — N17.9 ACUTE KIDNEY FAILURE, UNSPECIFIED: ICD-10-CM

## 2021-03-25 DIAGNOSIS — E11.22 TYPE 2 DIABETES MELLITUS WITH DIABETIC CHRONIC KIDNEY DISEASE: ICD-10-CM

## 2021-03-25 DIAGNOSIS — R62.7 ADULT FAILURE TO THRIVE: ICD-10-CM

## 2021-03-25 DIAGNOSIS — F10.10 ALCOHOL ABUSE, UNCOMPLICATED: ICD-10-CM

## 2021-03-25 DIAGNOSIS — D61.818 OTHER PANCYTOPENIA: ICD-10-CM

## 2021-03-25 DIAGNOSIS — B20 HUMAN IMMUNODEFICIENCY VIRUS [HIV] DISEASE: ICD-10-CM

## 2021-03-25 DIAGNOSIS — F12.10 CANNABIS ABUSE, UNCOMPLICATED: ICD-10-CM

## 2021-03-25 SDOH — ECONOMIC STABILITY - HOUSING INSECURITY: HOMELESSNESS: Z59.0

## 2022-08-23 NOTE — PROGRESS NOTE ADULT - PROBLEM SELECTOR PROBLEM 6
Essential hypertension Provider Procedure Text (B): After obtaining clear surgical margins the defect was repaired by another provider.

## 2022-10-12 NOTE — H&P ADULT - NSICDXPASTMEDICALHX_GEN_ALL_CORE_FT
PAST MEDICAL HISTORY:  Bipolar disorder     Cocaine use     Diabetes type 2, uncontrolled     Essential hypertension     HIV (human immunodeficiency virus infection)      Topical Ketoconazole Counseling: Patient counseled that this medication may cause skin irritation or allergic reactions.  In the event of skin irritation, the patient was advised to reduce the amount of the drug applied or use it less frequently.   The patient verbalized understanding of the proper use and possible adverse effects of ketoconazole.  All of the patient's questions and concerns were addressed.

## 2024-04-07 NOTE — OCCUPATIONAL THERAPY INITIAL EVALUATION ADULT - PHYSICAL ASSIST/NONPHYSICAL ASSIST:DRESS UPPER BODY, OT EVAL
set-up required/verbal cues/1 person assist